# Patient Record
Sex: FEMALE | Race: OTHER | NOT HISPANIC OR LATINO
[De-identification: names, ages, dates, MRNs, and addresses within clinical notes are randomized per-mention and may not be internally consistent; named-entity substitution may affect disease eponyms.]

---

## 2017-01-11 ENCOUNTER — APPOINTMENT (OUTPATIENT)
Dept: NEUROLOGY | Facility: CLINIC | Age: 52
End: 2017-01-11

## 2017-01-11 ENCOUNTER — APPOINTMENT (OUTPATIENT)
Dept: OPHTHALMOLOGY | Facility: CLINIC | Age: 52
End: 2017-01-11

## 2017-01-11 VITALS
SYSTOLIC BLOOD PRESSURE: 154 MMHG | HEIGHT: 65.5 IN | WEIGHT: 164 LBS | DIASTOLIC BLOOD PRESSURE: 96 MMHG | HEART RATE: 55 BPM | BODY MASS INDEX: 27 KG/M2

## 2017-01-11 DIAGNOSIS — Z86.79 PERSONAL HISTORY OF OTHER DISEASES OF THE CIRCULATORY SYSTEM: ICD-10-CM

## 2017-01-11 DIAGNOSIS — Z78.9 OTHER SPECIFIED HEALTH STATUS: ICD-10-CM

## 2017-01-11 DIAGNOSIS — Z83.49 FAMILY HISTORY OF OTHER ENDOCRINE, NUTRITIONAL AND METABOLIC DISEASES: ICD-10-CM

## 2017-01-11 DIAGNOSIS — Z83.3 FAMILY HISTORY OF DIABETES MELLITUS: ICD-10-CM

## 2017-01-11 DIAGNOSIS — Z82.49 FAMILY HISTORY OF ISCHEMIC HEART DISEASE AND OTHER DISEASES OF THE CIRCULATORY SYSTEM: ICD-10-CM

## 2017-01-11 DIAGNOSIS — H35.033 HYPERTENSIVE RETINOPATHY, BILATERAL: ICD-10-CM

## 2017-01-20 ENCOUNTER — APPOINTMENT (OUTPATIENT)
Dept: NEUROLOGY | Facility: CLINIC | Age: 52
End: 2017-01-20

## 2017-02-06 ENCOUNTER — OUTPATIENT (OUTPATIENT)
Dept: OUTPATIENT SERVICES | Facility: HOSPITAL | Age: 52
LOS: 1 days | End: 2017-02-06
Payer: COMMERCIAL

## 2017-02-06 ENCOUNTER — APPOINTMENT (OUTPATIENT)
Dept: MRI IMAGING | Facility: CLINIC | Age: 52
End: 2017-02-06

## 2017-02-06 DIAGNOSIS — H26.40 UNSPECIFIED SECONDARY CATARACT: Chronic | ICD-10-CM

## 2017-02-06 DIAGNOSIS — Z00.8 ENCOUNTER FOR OTHER GENERAL EXAMINATION: ICD-10-CM

## 2017-02-06 PROCEDURE — 70551 MRI BRAIN STEM W/O DYE: CPT

## 2017-02-14 ENCOUNTER — APPOINTMENT (OUTPATIENT)
Dept: NEUROLOGY | Facility: CLINIC | Age: 52
End: 2017-02-14

## 2017-02-14 VITALS
WEIGHT: 165 LBS | DIASTOLIC BLOOD PRESSURE: 89 MMHG | HEART RATE: 61 BPM | HEIGHT: 65.5 IN | SYSTOLIC BLOOD PRESSURE: 130 MMHG | BODY MASS INDEX: 27.16 KG/M2

## 2017-02-14 DIAGNOSIS — M54.2 CERVICALGIA: ICD-10-CM

## 2017-02-14 DIAGNOSIS — G89.29 LOW BACK PAIN: ICD-10-CM

## 2017-02-14 DIAGNOSIS — M54.5 LOW BACK PAIN: ICD-10-CM

## 2017-02-15 PROBLEM — M54.5 CHRONIC LBP: Status: RESOLVED | Noted: 2017-02-15 | Resolved: 2017-02-15

## 2017-02-15 PROBLEM — M54.2 CERVICALGIA: Status: RESOLVED | Noted: 2017-02-15 | Resolved: 2017-02-15

## 2017-02-15 RX ORDER — CEFUROXIME AXETIL 500 MG/1
500 TABLET ORAL
Qty: 20 | Refills: 0 | Status: COMPLETED | COMMUNITY
Start: 2016-12-23 | End: 2017-02-15

## 2017-02-15 RX ORDER — MECLIZINE HYDROCHLORIDE 25 MG/1
25 TABLET ORAL
Qty: 90 | Refills: 0 | Status: COMPLETED | COMMUNITY
Start: 2016-12-23 | End: 2017-02-15

## 2017-03-29 ENCOUNTER — APPOINTMENT (OUTPATIENT)
Dept: NEUROLOGY | Facility: CLINIC | Age: 52
End: 2017-03-29

## 2017-03-29 VITALS
DIASTOLIC BLOOD PRESSURE: 85 MMHG | BODY MASS INDEX: 26.34 KG/M2 | HEIGHT: 65.5 IN | SYSTOLIC BLOOD PRESSURE: 131 MMHG | HEART RATE: 63 BPM | WEIGHT: 160 LBS

## 2017-03-29 DIAGNOSIS — Z78.9 OTHER SPECIFIED HEALTH STATUS: ICD-10-CM

## 2017-03-29 RX ORDER — LISINOPRIL 2.5 MG/1
2.5 TABLET ORAL
Refills: 0 | Status: DISCONTINUED | COMMUNITY
End: 2017-03-29

## 2017-03-29 RX ORDER — LISINOPRIL 5 MG/1
5 TABLET ORAL
Qty: 30 | Refills: 0 | Status: DISCONTINUED | COMMUNITY
Start: 2017-01-24 | End: 2017-03-29

## 2017-03-29 RX ORDER — MOMETASONE FUROATE 1 MG/G
0.1 CREAM TOPICAL
Qty: 45 | Refills: 0 | Status: DISCONTINUED | COMMUNITY
Start: 2016-08-23 | End: 2017-03-29

## 2017-03-29 RX ORDER — DICLOFENAC SODIUM 50 MG/1
50 TABLET, DELAYED RELEASE ORAL
Qty: 60 | Refills: 0 | Status: DISCONTINUED | COMMUNITY
Start: 2016-08-30 | End: 2017-03-29

## 2017-03-29 RX ORDER — LOSARTAN POTASSIUM 25 MG/1
25 TABLET, FILM COATED ORAL
Qty: 30 | Refills: 0 | Status: DISCONTINUED | COMMUNITY
Start: 2017-01-31 | End: 2017-03-29

## 2017-03-29 RX ORDER — ATORVASTATIN CALCIUM 40 MG/1
40 TABLET, FILM COATED ORAL
Refills: 0 | Status: DISCONTINUED | COMMUNITY
Start: 2017-01-11 | End: 2017-03-29

## 2017-04-04 ENCOUNTER — APPOINTMENT (OUTPATIENT)
Dept: NEUROLOGY | Facility: CLINIC | Age: 52
End: 2017-04-04

## 2017-04-12 ENCOUNTER — APPOINTMENT (OUTPATIENT)
Dept: NEUROLOGY | Facility: CLINIC | Age: 52
End: 2017-04-12

## 2017-04-12 VITALS
WEIGHT: 162 LBS | HEART RATE: 62 BPM | SYSTOLIC BLOOD PRESSURE: 119 MMHG | HEIGHT: 65.5 IN | BODY MASS INDEX: 26.67 KG/M2 | DIASTOLIC BLOOD PRESSURE: 76 MMHG

## 2017-04-12 PROBLEM — Z78.9 NON-SMOKER: Status: RESOLVED | Noted: 2017-01-11 | Resolved: 2017-04-12

## 2017-04-12 RX ORDER — AMLODIPINE BESYLATE 2.5 MG/1
2.5 TABLET ORAL
Qty: 30 | Refills: 0 | Status: COMPLETED | COMMUNITY
Start: 2017-02-07 | End: 2017-04-12

## 2017-04-12 RX ORDER — LEVETIRACETAM 500 MG/1
500 TABLET, FILM COATED ORAL DAILY
Qty: 30 | Refills: 3 | Status: DISCONTINUED | COMMUNITY
Start: 2017-02-14 | End: 2017-04-12

## 2017-04-12 RX ORDER — AMLODIPINE BESYLATE 5 MG/1
5 TABLET ORAL
Qty: 90 | Refills: 0 | Status: ACTIVE | COMMUNITY
Start: 2017-03-29

## 2017-04-19 ENCOUNTER — APPOINTMENT (OUTPATIENT)
Dept: OPHTHALMOLOGY | Facility: CLINIC | Age: 52
End: 2017-04-19

## 2017-05-04 PROBLEM — H35.033 HYPERTENSIVE RETINOPATHY, BILATERAL: Status: ACTIVE | Noted: 2017-01-11

## 2017-05-26 ENCOUNTER — APPOINTMENT (OUTPATIENT)
Dept: NEUROLOGY | Facility: CLINIC | Age: 52
End: 2017-05-26

## 2017-06-26 ENCOUNTER — APPOINTMENT (OUTPATIENT)
Dept: NEUROLOGY | Facility: CLINIC | Age: 52
End: 2017-06-26

## 2017-06-26 VITALS
HEIGHT: 65.5 IN | BODY MASS INDEX: 25.52 KG/M2 | HEART RATE: 56 BPM | WEIGHT: 155 LBS | SYSTOLIC BLOOD PRESSURE: 139 MMHG | DIASTOLIC BLOOD PRESSURE: 99 MMHG

## 2017-06-26 RX ORDER — BETAMETHASONE DIPROPIONATE 0.5 MG/G
0.05 CREAM, AUGMENTED TOPICAL
Qty: 50 | Refills: 0 | Status: DISCONTINUED | COMMUNITY
Start: 2016-07-14 | End: 2017-06-26

## 2017-06-26 RX ORDER — LIDOCAINE 5 G/100G
5 OINTMENT TOPICAL
Qty: 100 | Refills: 0 | Status: DISCONTINUED | COMMUNITY
Start: 2016-11-02 | End: 2017-06-26

## 2017-06-26 RX ORDER — DICLOFENAC SODIUM 3 G/100G
3 GEL TOPICAL
Qty: 100 | Refills: 0 | Status: DISCONTINUED | COMMUNITY
Start: 2016-10-25 | End: 2017-06-26

## 2017-07-08 ENCOUNTER — FORM ENCOUNTER (OUTPATIENT)
Age: 52
End: 2017-07-08

## 2017-07-09 ENCOUNTER — OUTPATIENT (OUTPATIENT)
Dept: OUTPATIENT SERVICES | Facility: HOSPITAL | Age: 52
LOS: 1 days | End: 2017-07-09
Payer: COMMERCIAL

## 2017-07-09 ENCOUNTER — APPOINTMENT (OUTPATIENT)
Dept: MRI IMAGING | Facility: CLINIC | Age: 52
End: 2017-07-09

## 2017-07-09 DIAGNOSIS — H26.40 UNSPECIFIED SECONDARY CATARACT: Chronic | ICD-10-CM

## 2017-07-09 DIAGNOSIS — I61.0 NONTRAUMATIC INTRACEREBRAL HEMORRHAGE IN HEMISPHERE, SUBCORTICAL: ICD-10-CM

## 2017-07-09 PROCEDURE — 70546 MR ANGIOGRAPH HEAD W/O&W/DYE: CPT

## 2017-07-09 PROCEDURE — 70553 MRI BRAIN STEM W/O & W/DYE: CPT

## 2017-07-09 PROCEDURE — A9585: CPT

## 2017-07-25 ENCOUNTER — APPOINTMENT (OUTPATIENT)
Dept: NEUROLOGY | Facility: CLINIC | Age: 52
End: 2017-07-25

## 2017-11-06 ENCOUNTER — APPOINTMENT (OUTPATIENT)
Dept: NEUROLOGY | Facility: CLINIC | Age: 52
End: 2017-11-06
Payer: COMMERCIAL

## 2017-11-06 VITALS
BODY MASS INDEX: 25.83 KG/M2 | HEIGHT: 65 IN | SYSTOLIC BLOOD PRESSURE: 118 MMHG | WEIGHT: 155 LBS | DIASTOLIC BLOOD PRESSURE: 74 MMHG | HEART RATE: 60 BPM

## 2017-11-06 PROCEDURE — 99215 OFFICE O/P EST HI 40 MIN: CPT

## 2017-11-06 RX ORDER — LORAZEPAM 1 MG/1
1 TABLET ORAL TWICE DAILY
Qty: 5 | Refills: 0 | Status: DISCONTINUED | COMMUNITY
Start: 2017-05-16 | End: 2017-11-06

## 2018-02-27 ENCOUNTER — INPATIENT (INPATIENT)
Facility: HOSPITAL | Age: 53
LOS: 7 days | Discharge: HOME | End: 2018-03-07
Attending: INTERNAL MEDICINE | Admitting: INTERNAL MEDICINE
Payer: COMMERCIAL

## 2018-02-27 ENCOUNTER — OTHER (OUTPATIENT)
Age: 53
End: 2018-02-27

## 2018-02-27 VITALS
OXYGEN SATURATION: 99 % | HEART RATE: 57 BPM | SYSTOLIC BLOOD PRESSURE: 141 MMHG | RESPIRATION RATE: 18 BRPM | DIASTOLIC BLOOD PRESSURE: 86 MMHG

## 2018-02-27 DIAGNOSIS — H26.40 UNSPECIFIED SECONDARY CATARACT: Chronic | ICD-10-CM

## 2018-02-27 LAB
ALBUMIN SERPL ELPH-MCNC: 4.2 G/DL — SIGNIFICANT CHANGE UP (ref 3.3–5)
ALP SERPL-CCNC: 58 U/L — SIGNIFICANT CHANGE UP (ref 40–120)
ALT FLD-CCNC: 13 U/L — SIGNIFICANT CHANGE UP (ref 4–33)
APPEARANCE UR: CLEAR — SIGNIFICANT CHANGE UP
APTT BLD: 31.9 SEC — SIGNIFICANT CHANGE UP (ref 27.5–37.4)
AST SERPL-CCNC: 16 U/L — SIGNIFICANT CHANGE UP (ref 4–32)
BASOPHILS # BLD AUTO: 0.05 K/UL — SIGNIFICANT CHANGE UP (ref 0–0.2)
BASOPHILS NFR BLD AUTO: 0.7 % — SIGNIFICANT CHANGE UP (ref 0–2)
BILIRUB SERPL-MCNC: 0.3 MG/DL — SIGNIFICANT CHANGE UP (ref 0.2–1.2)
BILIRUB UR-MCNC: NEGATIVE — SIGNIFICANT CHANGE UP
BLOOD UR QL VISUAL: NEGATIVE — SIGNIFICANT CHANGE UP
BUN SERPL-MCNC: 10 MG/DL — SIGNIFICANT CHANGE UP (ref 7–23)
CALCIUM SERPL-MCNC: 9 MG/DL — SIGNIFICANT CHANGE UP (ref 8.4–10.5)
CHLORIDE SERPL-SCNC: 102 MMOL/L — SIGNIFICANT CHANGE UP (ref 98–107)
CK MB BLD-MCNC: 1.12 NG/ML — SIGNIFICANT CHANGE UP (ref 1–4.7)
CK MB BLD-MCNC: SIGNIFICANT CHANGE UP (ref 0–2.5)
CK SERPL-CCNC: 75 U/L — SIGNIFICANT CHANGE UP (ref 25–170)
CO2 SERPL-SCNC: 28 MMOL/L — SIGNIFICANT CHANGE UP (ref 22–31)
COLOR SPEC: SIGNIFICANT CHANGE UP
CREAT SERPL-MCNC: 0.79 MG/DL — SIGNIFICANT CHANGE UP (ref 0.5–1.3)
EOSINOPHIL # BLD AUTO: 0.16 K/UL — SIGNIFICANT CHANGE UP (ref 0–0.5)
EOSINOPHIL NFR BLD AUTO: 2.2 % — SIGNIFICANT CHANGE UP (ref 0–6)
GLUCOSE SERPL-MCNC: 132 MG/DL — HIGH (ref 70–99)
GLUCOSE UR-MCNC: NEGATIVE — SIGNIFICANT CHANGE UP
HCT VFR BLD CALC: 40 % — SIGNIFICANT CHANGE UP (ref 34.5–45)
HGB BLD-MCNC: 13.2 G/DL — SIGNIFICANT CHANGE UP (ref 11.5–15.5)
IMM GRANULOCYTES # BLD AUTO: 0.02 # — SIGNIFICANT CHANGE UP
IMM GRANULOCYTES NFR BLD AUTO: 0.3 % — SIGNIFICANT CHANGE UP (ref 0–1.5)
INR BLD: 0.97 — SIGNIFICANT CHANGE UP (ref 0.88–1.17)
KETONES UR-MCNC: NEGATIVE — SIGNIFICANT CHANGE UP
LEUKOCYTE ESTERASE UR-ACNC: NEGATIVE — SIGNIFICANT CHANGE UP
LYMPHOCYTES # BLD AUTO: 2.8 K/UL — SIGNIFICANT CHANGE UP (ref 1–3.3)
LYMPHOCYTES # BLD AUTO: 39.3 % — SIGNIFICANT CHANGE UP (ref 13–44)
MCHC RBC-ENTMCNC: 28.6 PG — SIGNIFICANT CHANGE UP (ref 27–34)
MCHC RBC-ENTMCNC: 33 % — SIGNIFICANT CHANGE UP (ref 32–36)
MCV RBC AUTO: 86.8 FL — SIGNIFICANT CHANGE UP (ref 80–100)
MONOCYTES # BLD AUTO: 0.48 K/UL — SIGNIFICANT CHANGE UP (ref 0–0.9)
MONOCYTES NFR BLD AUTO: 6.7 % — SIGNIFICANT CHANGE UP (ref 2–14)
NEUTROPHILS # BLD AUTO: 3.61 K/UL — SIGNIFICANT CHANGE UP (ref 1.8–7.4)
NEUTROPHILS NFR BLD AUTO: 50.8 % — SIGNIFICANT CHANGE UP (ref 43–77)
NITRITE UR-MCNC: NEGATIVE — SIGNIFICANT CHANGE UP
NRBC # FLD: 0 — SIGNIFICANT CHANGE UP
PH UR: 6.5 — SIGNIFICANT CHANGE UP (ref 4.6–8)
PLATELET # BLD AUTO: 259 K/UL — SIGNIFICANT CHANGE UP (ref 150–400)
PMV BLD: 10.8 FL — SIGNIFICANT CHANGE UP (ref 7–13)
POTASSIUM SERPL-MCNC: 4.2 MMOL/L — SIGNIFICANT CHANGE UP (ref 3.5–5.3)
POTASSIUM SERPL-SCNC: 4.2 MMOL/L — SIGNIFICANT CHANGE UP (ref 3.5–5.3)
PROT SERPL-MCNC: 6.9 G/DL — SIGNIFICANT CHANGE UP (ref 6–8.3)
PROT UR-MCNC: NEGATIVE MG/DL — SIGNIFICANT CHANGE UP
PROTHROM AB SERPL-ACNC: 11.2 SEC — SIGNIFICANT CHANGE UP (ref 9.8–13.1)
RBC # BLD: 4.61 M/UL — SIGNIFICANT CHANGE UP (ref 3.8–5.2)
RBC # FLD: 13.3 % — SIGNIFICANT CHANGE UP (ref 10.3–14.5)
SODIUM SERPL-SCNC: 142 MMOL/L — SIGNIFICANT CHANGE UP (ref 135–145)
SP GR SPEC: 1.01 — SIGNIFICANT CHANGE UP (ref 1–1.04)
SQUAMOUS # UR AUTO: SIGNIFICANT CHANGE UP
TROPONIN T SERPL-MCNC: < 0.06 NG/ML — SIGNIFICANT CHANGE UP (ref 0–0.06)
TSH SERPL-MCNC: 4.32 UIU/ML — HIGH (ref 0.27–4.2)
UROBILINOGEN FLD QL: NORMAL MG/DL — SIGNIFICANT CHANGE UP
WBC # BLD: 7.12 K/UL — SIGNIFICANT CHANGE UP (ref 3.8–10.5)
WBC # FLD AUTO: 7.12 K/UL — SIGNIFICANT CHANGE UP (ref 3.8–10.5)

## 2018-02-27 PROCEDURE — 71046 X-RAY EXAM CHEST 2 VIEWS: CPT | Mod: 26

## 2018-02-27 RX ORDER — METOCLOPRAMIDE HCL 10 MG
10 TABLET ORAL ONCE
Qty: 0 | Refills: 0 | Status: COMPLETED | OUTPATIENT
Start: 2018-02-27 | End: 2018-02-27

## 2018-02-27 RX ORDER — ACETAMINOPHEN 500 MG
975 TABLET ORAL ONCE
Qty: 0 | Refills: 0 | Status: COMPLETED | OUTPATIENT
Start: 2018-02-27 | End: 2018-02-27

## 2018-02-27 RX ORDER — SODIUM CHLORIDE 9 MG/ML
1000 INJECTION INTRAMUSCULAR; INTRAVENOUS; SUBCUTANEOUS ONCE
Qty: 0 | Refills: 0 | Status: COMPLETED | OUTPATIENT
Start: 2018-02-27 | End: 2018-02-27

## 2018-02-27 RX ADMIN — Medication 975 MILLIGRAM(S): at 22:42

## 2018-02-27 RX ADMIN — Medication 975 MILLIGRAM(S): at 23:28

## 2018-02-27 RX ADMIN — Medication 10 MILLIGRAM(S): at 22:42

## 2018-02-27 RX ADMIN — SODIUM CHLORIDE 1000 MILLILITER(S): 9 INJECTION INTRAMUSCULAR; INTRAVENOUS; SUBCUTANEOUS at 22:42

## 2018-02-27 NOTE — ED PROVIDER NOTE - CARDIAC, MLM
Normal rate, regular rhythm.  Heart sounds S1, S2.  No murmurs, rubs or gallops. +reproducible CP wall pain in left pectoral region

## 2018-02-27 NOTE — ED PROVIDER NOTE - SHIFT CHANGE DETAILS
I have signed over this patient to the above attending physician. Pertinent history, physical exam findings and workup thus far in the ED have been discussed. The pending tests and plan, including LP and reassessment were signed over.  All questions from the above attending physician have been answered.

## 2018-02-27 NOTE — ED PROVIDER NOTE - FAMILY HISTORY
Aunt  Still living? Unknown  Family history of cerebrovascular accident (CVA), Age at diagnosis: Age Unknown     Father  Still living? Unknown  Family history of Parkinson disease, Age at diagnosis: Age Unknown     Mother  Still living? Unknown  Family history of hypertension, Age at diagnosis: Age Unknown

## 2018-02-27 NOTE — ED PROVIDER NOTE - MEDICAL DECISION MAKING DETAILS
51 y/o female with pmhx of ICH (residual left sided weakness/numbness and hyperacusis, baseline difficulty findings words), HTN, GERD, neuropathy presents to ED for gradual onset right sided "pressure-like" nonexertional headache since 8:30pm last night. plan: labs, CT head r/o CVA, likely LP to r/o subarachnoid hemorrhage given hx.

## 2018-02-27 NOTE — ED ADULT TRIAGE NOTE - CHIEF COMPLAINT QUOTE
pt c/o severe HA since last night, Hx of hemorraghic stroke with residual left sided weakness. pt states feels like her left side is weaker then normal. MD Donald called for stroke eval, code stroke called at 2045.  pt taken directly to CT.

## 2018-02-27 NOTE — ED PROVIDER NOTE - PROGRESS NOTE DETAILS
SHIMA Olivia - as per Dr. Matos, received call from radiology attng regarding change in CT read, possible bleed and suggesting f/u CT. awaiting CT head. pt stable. SHIMA Tsang I called radiology, spoke with Dr. Mccollum attending for 2nd CT scan and reading as possible subarachnoid hemorrhage. neurosurg paged. SHIMA Tsang I called radiology, spoke with Dr. Mccollum attending for 2nd CT scan and reading as possible subarachnoid hemorrhage. neurosurg paged and will see pt in ED shortly. SHIMA Olivia - neurology paged. PA Gerasimou - neurosurg PA does not believe CT head shows any hemorrhage suggesting to LP. SHIMA Argueta - pt signed out to me at the shift change. 51 y/o female withy Pmhx of SAH with HA (resolved), questionable small bleed on CT, LP neg for xanthochromia, seen by neurosurg, repat CT neg, will be seen by neurosurg att am, if cleared will dc with o/p f/u. SHIMA Argueta - seen by neurosurg att Dr Barillas, it is ok to dc with neurosurg o/p f/u. SHIMA Argueta - pt discharged, IV out but now c/o worsening HA, will reinsert IV, pain meds, will admit for pain control. Dilia att: 8A Patient signed out to me. 52F h/o SAH p/w sudden onset rt sided ha. CT x 2 neg sah, 04:45 LP neg xanthochromia, ok for dc per Neurosx. Patient preparing for discharge. Patient sat up suddenly and felt sudden onset diffuse coronal ha, relieved with lying flat, worse with sound and light. Patient re-evaluated by me. Written for 2L saline and IV caffeine. If no improvement consider Anesthesia for blood patch +/- CT head. SHIMA Argueta - pt with improved HA when she stays flat, ESCOBEDO comes back when pt sits up making it suspicious for post LP HA, anesthesia called, they recommend conservative management for 24hrs, if no improvement  will consider blood patch. SHIMA shields - spoke with PCP Dr Meza, will admit to dr Farley (called and notified).

## 2018-02-27 NOTE — ED ADULT NURSE NOTE - OBJECTIVE STATEMENT
Fac RN: Pt received in room 20, aox3, ambulatory, accompanied by , stroke code called for patient. Pt presents with HA that started last night, took tylenol but with no relief, was also reporting that she feels "confused" and is having a hard time understanding of "what people are telling me." Pt also c/o L sided weakness and L sided chest tightness radiating to the back. Pt with residual L sided weakness from a previous hemorrhagic stroke in 2016, sts similar kind of HA from, previous stroke but sts that she feels like the L sided weakness is worse now. Neuro eval done after CT- TPA not recommended. ED MD eval ongoing at this time, 20G placed on R AC, labs sent, VS as noted, NAD, report given to primary RN.

## 2018-02-27 NOTE — ED PROVIDER NOTE - OBJECTIVE STATEMENT
53 y/o female with pmhx of ICH (residual left sided weakness/numbness and hyperacusis, baseline difficulty findings words), HTN, GERD, neuropathy presents to ED for gradual onset right sided "pressure-like" nonexertional headache since 8:30pm last night. Now migrating to left side. Intermittent, found relief with Tylenol last night, now constant for last hour. Associated with nausea, photophobia and worsening left hyperacusis, 8/10. States she felt tired and had difficulty concentrating at work today. Sent in by her neurologist Dr. Castellon. Did not take any pain meds today. Pt states takes Gabapentin for left sided UE/LE numbness and feels worse x 2 days. Pt also admitted to left sided chest "pressure" x few hours. Associated with "feeling harder to breathe" although denies SOB. States different than baseline GERD. 8/10. Nonexertional, nonpleuritic, nonradiating. Never had stress test, had normal echo in past. No recent travel, surgeries, ocp use, IUDs, immobilization/recent hospitalization. No fever, chills, palpitations, cough, abd pain, vomiting, new weakness or numbness, 51 y/o female with pmhx of ICH (residual left sided weakness/numbness and hyperacusis, baseline difficulty findings words), HTN, GERD, neuropathy presents to ED for gradual onset right sided "pressure-like" nonexertional headache since 8:30pm last night. Now migrating to left side. Intermittent, found relief with Tylenol last night, now constant for last hour. Associated with nausea, photophobia and worsening left hyperacusis, 8/10. States she felt tired and had difficulty concentrating at work today. Sent in by her neurologist Dr. Castellon. Did not take any pain meds today. Pt states takes Gabapentin for left sided UE/LE numbness and feels worse x 2 days. Pt also admitted to left sided chest "pressure" x few hours. Associated with "feeling harder to breathe" although denies SOB. States different than baseline GERD. 8/10. Nonexertional, nonpleuritic, nonradiating. Fam hx of MI mother at age 80. Never had stress test, had normal echo in past. No recent travel, surgeries, ocp use, IUDs, immobilization/recent hospitalization. No fever, chills, palpitations, cough, abd pain, vomiting, new weakness or numbness,

## 2018-02-27 NOTE — ED PROVIDER NOTE - ATTENDING CONTRIBUTION TO CARE
Beth: 51 yo female with a h/o spontaneous right sided ICH and residual left sided sensory deficits c/o headache that began last night. Pt has not taken anything for pain at home. Headache was not acute onset- gradually worsened over time and is not associated with visual changes, or dizziness. Pt feels like her hyperacusis has worsened since the headache began too. Pt also c/o intermittent chest tightness but no SOB. Exam: CNII-XII intact, no dysmetria or disdiadochokinesia, 5/5 strength x 4 extremities. +LLE sensory deficits- chronic as per patient.  +S1/S2, lungs CTA, abdomen is soft and nontender. 2+ distal pulses x 4 extremities. abdomen is soft and nontender with no pulsatile mass. A/P-51 yo female code stroke for right sided headache and h/o ICH. Ct head unremarkable. will treat symptomatically and reassess.

## 2018-02-28 DIAGNOSIS — R51 HEADACHE: ICD-10-CM

## 2018-02-28 DIAGNOSIS — I10 ESSENTIAL (PRIMARY) HYPERTENSION: ICD-10-CM

## 2018-02-28 LAB
CK MB BLD-MCNC: 1.06 NG/ML — SIGNIFICANT CHANGE UP (ref 1–4.7)
CK MB BLD-MCNC: SIGNIFICANT CHANGE UP (ref 0–2.5)
CK SERPL-CCNC: 55 U/L — SIGNIFICANT CHANGE UP (ref 25–170)
CLARITY CSF: CLEAR — SIGNIFICANT CHANGE UP
COLOR CSF: SIGNIFICANT CHANGE UP
GLUCOSE CSF-MCNC: 68 MG/DL — SIGNIFICANT CHANGE UP (ref 40–70)
GRAM STN CSF: SIGNIFICANT CHANGE UP
NRBC NFR CSF: < 1 CELL/UL — SIGNIFICANT CHANGE UP (ref 0–5)
PROT CSF-MCNC: 28.8 MG/DL — SIGNIFICANT CHANGE UP (ref 15–45)
RBC # CSF: < 1 CELL/UL — HIGH (ref 0–0)
SPECIMEN SOURCE: SIGNIFICANT CHANGE UP
TROPONIN T SERPL-MCNC: < 0.06 NG/ML — SIGNIFICANT CHANGE UP (ref 0–0.06)
XANTHOCHROMIA: SIGNIFICANT CHANGE UP

## 2018-02-28 PROCEDURE — 70450 CT HEAD/BRAIN W/O DYE: CPT | Mod: 26

## 2018-02-28 PROCEDURE — 99222 1ST HOSP IP/OBS MODERATE 55: CPT

## 2018-02-28 RX ORDER — GABAPENTIN 400 MG/1
300 CAPSULE ORAL THREE TIMES A DAY
Qty: 0 | Refills: 0 | Status: DISCONTINUED | OUTPATIENT
Start: 2018-02-28 | End: 2018-03-07

## 2018-02-28 RX ORDER — PANTOPRAZOLE SODIUM 20 MG/1
40 TABLET, DELAYED RELEASE ORAL
Qty: 0 | Refills: 0 | Status: DISCONTINUED | OUTPATIENT
Start: 2018-02-28 | End: 2018-03-07

## 2018-02-28 RX ORDER — LISINOPRIL 2.5 MG/1
2.5 TABLET ORAL DAILY
Qty: 0 | Refills: 0 | Status: DISCONTINUED | OUTPATIENT
Start: 2018-02-28 | End: 2018-03-01

## 2018-02-28 RX ORDER — SODIUM CHLORIDE 9 MG/ML
2000 INJECTION INTRAMUSCULAR; INTRAVENOUS; SUBCUTANEOUS ONCE
Qty: 0 | Refills: 0 | Status: COMPLETED | OUTPATIENT
Start: 2018-02-28 | End: 2018-02-28

## 2018-02-28 RX ORDER — ACETAMINOPHEN 500 MG
650 TABLET ORAL EVERY 6 HOURS
Qty: 0 | Refills: 0 | Status: DISCONTINUED | OUTPATIENT
Start: 2018-02-28 | End: 2018-03-03

## 2018-02-28 RX ORDER — GABAPENTIN 400 MG/1
100 CAPSULE ORAL ONCE
Qty: 0 | Refills: 0 | Status: COMPLETED | OUTPATIENT
Start: 2018-02-28 | End: 2018-02-28

## 2018-02-28 RX ORDER — ATORVASTATIN CALCIUM 80 MG/1
40 TABLET, FILM COATED ORAL AT BEDTIME
Qty: 0 | Refills: 0 | Status: DISCONTINUED | OUTPATIENT
Start: 2018-02-28 | End: 2018-03-01

## 2018-02-28 RX ORDER — METOCLOPRAMIDE HCL 10 MG
10 TABLET ORAL ONCE
Qty: 0 | Refills: 0 | Status: COMPLETED | OUTPATIENT
Start: 2018-02-28 | End: 2018-02-28

## 2018-02-28 RX ORDER — CAFFEINE/SODIUM BENZOATE 250 MG/ML
500 VIAL (ML) INJECTION ONCE
Qty: 0 | Refills: 0 | Status: COMPLETED | OUTPATIENT
Start: 2018-02-28 | End: 2018-02-28

## 2018-02-28 RX ADMIN — Medication 10 MILLIGRAM(S): at 08:50

## 2018-02-28 RX ADMIN — Medication 1002 MILLIGRAM(S): at 10:00

## 2018-02-28 RX ADMIN — SODIUM CHLORIDE 2000 MILLILITER(S): 9 INJECTION INTRAMUSCULAR; INTRAVENOUS; SUBCUTANEOUS at 09:04

## 2018-02-28 RX ADMIN — GABAPENTIN 300 MILLIGRAM(S): 400 CAPSULE ORAL at 23:31

## 2018-02-28 RX ADMIN — GABAPENTIN 100 MILLIGRAM(S): 400 CAPSULE ORAL at 10:27

## 2018-02-28 RX ADMIN — Medication 650 MILLIGRAM(S): at 23:31

## 2018-02-28 RX ADMIN — ATORVASTATIN CALCIUM 40 MILLIGRAM(S): 80 TABLET, FILM COATED ORAL at 22:37

## 2018-02-28 NOTE — H&P ADULT - ASSESSMENT
53 y/o female with pmhx of ICH (residual left sided weakness/numbness and hyperacusis, baseline difficulty findings words), HTN, GERD, neuropathy presents to ED for gradual onset right sided "pressure-like" nonexertional headache since 8:30pm last night. Now migrating to left side. Intermittent, found relief with Tylenol last night, now constant for last hour. Associated with nausea, photophobia and worsening left hyperacusis, 8/10. States she felt tired and had difficulty concentrating at work today. Sent in by her neurologist Dr. Castellon. Did not take any pain meds today. Pt states takes Gabapentin for left sided UE/LE numbness and feels worse x 2 days. Pt also admitted to left sided chest "pressure" x few hours. Associated with "feeling harder to breathe" although denies SOB. States different than baseline GERD. 8/10. Nonexertional, nonpleuritic, nonradiating. Fam hx of MI mother at age 80. Never had stress test, had normal echo in past. No recent travel, surgeries, ocp use, IUDs, immobilization/recent hospitalization. No fever, chills, palpitations, cough, abd pain, vomiting, new weakness or numbness,

## 2018-02-28 NOTE — PATIENT PROFILE ADULT. - VISION (WITH CORRECTIVE LENSES IF THE PATIENT USUALLY WEARS THEM):
New onset L ear difficulty/Partially impaired: cannot see medication labels or newsprint, but can see obstacles in path, and the surrounding layout; can count fingers at arm's length

## 2018-02-28 NOTE — H&P ADULT - FAMILY HISTORY
Family history of Parkinson disease     Family history of hypertension     Aunt  Still living? Unknown  Family history of cerebrovascular accident (CVA), Age at diagnosis: Age Unknown

## 2018-02-28 NOTE — PATIENT PROFILE ADULT. - TEACHING/LEARNING LEARNING PREFERENCES
skill demonstration/individual instruction/written material/video/pictorial/computer/internet/verbal instruction/audio/group instruction

## 2018-02-28 NOTE — H&P ADULT - NSHPPHYSICALEXAM_GEN_ALL_CORE
General: WN/WD NAD  PERRLA  Neurology: A&Ox3, nonfocal, VILLAVICENCIO x 4  Respiratory: CTA B/L  CV: RRR, S1S2, no murmurs, rubs or gallops  Abdominal: Soft, NT, ND +BS, Last BM  Extremities: No edema, + peripheral pulses  Skin Normal

## 2018-02-28 NOTE — H&P ADULT - NSHPLABSRESULTS_GEN_ALL_CORE
Lab Results:  CBC  CBC Full  -  ( 2018 21:15 )  WBC Count : 7.12 K/uL  Hemoglobin : 13.2 g/dL  Hematocrit : 40.0 %  Platelet Count - Automated : 259 K/uL  Mean Cell Volume : 86.8 fL  Mean Cell Hemoglobin : 28.6 pg  Mean Cell Hemoglobin Concentration : 33.0 %  Auto Neutrophil # : 3.61 K/uL  Auto Lymphocyte # : 2.80 K/uL  Auto Monocyte # : 0.48 K/uL  Auto Eosinophil # : 0.16 K/uL  Auto Basophil # : 0.05 K/uL  Auto Neutrophil % : 50.8 %  Auto Lymphocyte % : 39.3 %  Auto Monocyte % : 6.7 %  Auto Eosinophil % : 2.2 %  Auto Basophil % : 0.7 %    .		Differential:	[] Automated		[] Manual  Chemistry                        13.2   7.12  )-----------( 259      ( 2018 21:15 )             40.0         142  |  102  |  10  ----------------------------<  132<H>  4.2   |  28  |  0.79    Ca    9.0      2018 21:15    TPro  6.9  /  Alb  4.2  /  TBili  0.3  /  DBili  x   /  AST  16  /  ALT  13  /  AlkPhos  58      LIVER FUNCTIONS - ( 2018 21:15 )  Alb: 4.2 g/dL / Pro: 6.9 g/dL / ALK PHOS: 58 u/L / ALT: 13 u/L / AST: 16 u/L / GGT: x           PT/INR - ( 2018 21:15 )   PT: 11.2 SEC;   INR: 0.97          PTT - ( 2018 21:15 )  PTT:31.9 SEC  Urinalysis Basic - ( 2018 22:40 )    Color: COLORL / Appearance: CLEAR / S.007 / pH: 6.5  Gluc: NEGATIVE / Ketone: NEGATIVE  / Bili: NEGATIVE / Urobili: NORMAL mg/dL   Blood: NEGATIVE / Protein: NEGATIVE mg/dL / Nitrite: NEGATIVE   Leuk Esterase: NEGATIVE / RBC: x / WBC x   Sq Epi: OCC / Non Sq Epi: x / Bacteria: x            MICROBIOLOGY/CULTURES:      RADIOLOGY RESULTS: reviewed

## 2018-02-28 NOTE — CONSULT NOTE ADULT - ASSESSMENT
51 y/o RH woman w/ HTN, HLD, R BG ICH in 12/2016 (hypertensive) w/ central pain syndrome presenting to ER c/o left-sided headache and worsening left-sided pain x 2 days. Patient was advised to come to ER by her neurologist to r/o acute ICH. Code stroke called.  Neurologic examination is consistent with patient's chronic deficits 2/2 R BG ICH in 2016.   CTH shows no acute pathology.   Impression: Likely exacerbation of chronic pain syndrome with possible additional migrainous component (headache + photophobia + phonophobia + nausea).     Recommend:  - Not a tPA candidate given symptoms present > 24 hours, no new neurologic deficits, and history of spontaneous ICH  - Symptomatic treatment: Recommend increasing gabapentin to 300 mg TID to treat possible central pain and headaches  - Tylenol 650 mg, Reglan 10 mg IVP for headache management. Would avoid NSAIDs given history of ICH.   - Workup per ED for L shoulder/chest pain  - No further inpatient neurologic testing at this time. Patient should follow up with her neurologist, Dr. Castellon at [address] [place] [address] for further management
51 YO female with Hx Right basal ganglia hemorrhage, HTN with headache, R/O SAH

## 2018-03-01 LAB
ALBUMIN SERPL ELPH-MCNC: 4 G/DL — SIGNIFICANT CHANGE UP (ref 3.3–5)
ALP SERPL-CCNC: 57 U/L — SIGNIFICANT CHANGE UP (ref 40–120)
ALT FLD-CCNC: 14 U/L — SIGNIFICANT CHANGE UP (ref 4–33)
AST SERPL-CCNC: 15 U/L — SIGNIFICANT CHANGE UP (ref 4–32)
BACTERIA UR CULT: SIGNIFICANT CHANGE UP
BILIRUB SERPL-MCNC: 0.6 MG/DL — SIGNIFICANT CHANGE UP (ref 0.2–1.2)
BUN SERPL-MCNC: 13 MG/DL — SIGNIFICANT CHANGE UP (ref 7–23)
CALCIUM SERPL-MCNC: 8.9 MG/DL — SIGNIFICANT CHANGE UP (ref 8.4–10.5)
CHLORIDE SERPL-SCNC: 104 MMOL/L — SIGNIFICANT CHANGE UP (ref 98–107)
CHOLEST SERPL-MCNC: 208 MG/DL — HIGH (ref 120–199)
CO2 SERPL-SCNC: 26 MMOL/L — SIGNIFICANT CHANGE UP (ref 22–31)
CREAT SERPL-MCNC: 0.75 MG/DL — SIGNIFICANT CHANGE UP (ref 0.5–1.3)
GLUCOSE SERPL-MCNC: 131 MG/DL — HIGH (ref 70–99)
HCT VFR BLD CALC: 41 % — SIGNIFICANT CHANGE UP (ref 34.5–45)
HDLC SERPL-MCNC: 60 MG/DL — SIGNIFICANT CHANGE UP (ref 45–65)
HGB BLD-MCNC: 13.3 G/DL — SIGNIFICANT CHANGE UP (ref 11.5–15.5)
LIPID PNL WITH DIRECT LDL SERPL: 139 MG/DL — SIGNIFICANT CHANGE UP
MAGNESIUM SERPL-MCNC: 1.9 MG/DL — SIGNIFICANT CHANGE UP (ref 1.6–2.6)
MCHC RBC-ENTMCNC: 28 PG — SIGNIFICANT CHANGE UP (ref 27–34)
MCHC RBC-ENTMCNC: 32.4 % — SIGNIFICANT CHANGE UP (ref 32–36)
MCV RBC AUTO: 86.3 FL — SIGNIFICANT CHANGE UP (ref 80–100)
NRBC # FLD: 0 — SIGNIFICANT CHANGE UP
PHOSPHATE SERPL-MCNC: 3.6 MG/DL — SIGNIFICANT CHANGE UP (ref 2.5–4.5)
PLATELET # BLD AUTO: 246 K/UL — SIGNIFICANT CHANGE UP (ref 150–400)
PMV BLD: 10.7 FL — SIGNIFICANT CHANGE UP (ref 7–13)
POTASSIUM SERPL-MCNC: 3.6 MMOL/L — SIGNIFICANT CHANGE UP (ref 3.5–5.3)
POTASSIUM SERPL-SCNC: 3.6 MMOL/L — SIGNIFICANT CHANGE UP (ref 3.5–5.3)
PROT SERPL-MCNC: 6.7 G/DL — SIGNIFICANT CHANGE UP (ref 6–8.3)
RBC # BLD: 4.75 M/UL — SIGNIFICANT CHANGE UP (ref 3.8–5.2)
RBC # FLD: 13.2 % — SIGNIFICANT CHANGE UP (ref 10.3–14.5)
SODIUM SERPL-SCNC: 141 MMOL/L — SIGNIFICANT CHANGE UP (ref 135–145)
SPECIMEN SOURCE: SIGNIFICANT CHANGE UP
TRIGL SERPL-MCNC: 99 MG/DL — SIGNIFICANT CHANGE UP (ref 10–149)
WBC # BLD: 5.4 K/UL — SIGNIFICANT CHANGE UP (ref 3.8–10.5)
WBC # FLD AUTO: 5.4 K/UL — SIGNIFICANT CHANGE UP (ref 3.8–10.5)

## 2018-03-01 PROCEDURE — 70551 MRI BRAIN STEM W/O DYE: CPT | Mod: 26

## 2018-03-01 RX ORDER — METOCLOPRAMIDE HCL 10 MG
10 TABLET ORAL ONCE
Qty: 0 | Refills: 0 | Status: COMPLETED | OUTPATIENT
Start: 2018-03-01 | End: 2018-03-01

## 2018-03-01 RX ORDER — SODIUM CHLORIDE 9 MG/ML
1000 INJECTION INTRAMUSCULAR; INTRAVENOUS; SUBCUTANEOUS
Qty: 0 | Refills: 0 | Status: DISCONTINUED | OUTPATIENT
Start: 2018-03-01 | End: 2018-03-06

## 2018-03-01 RX ORDER — AMLODIPINE BESYLATE 2.5 MG/1
5 TABLET ORAL DAILY
Qty: 0 | Refills: 0 | Status: DISCONTINUED | OUTPATIENT
Start: 2018-03-01 | End: 2018-03-07

## 2018-03-01 RX ORDER — OMEPRAZOLE 10 MG/1
1 CAPSULE, DELAYED RELEASE ORAL
Qty: 0 | Refills: 0 | COMMUNITY

## 2018-03-01 RX ADMIN — Medication 650 MILLIGRAM(S): at 00:31

## 2018-03-01 RX ADMIN — SODIUM CHLORIDE 100 MILLILITER(S): 9 INJECTION INTRAMUSCULAR; INTRAVENOUS; SUBCUTANEOUS at 22:19

## 2018-03-01 RX ADMIN — Medication 650 MILLIGRAM(S): at 08:11

## 2018-03-01 RX ADMIN — Medication 650 MILLIGRAM(S): at 07:16

## 2018-03-01 RX ADMIN — GABAPENTIN 300 MILLIGRAM(S): 400 CAPSULE ORAL at 07:17

## 2018-03-01 RX ADMIN — SODIUM CHLORIDE 100 MILLILITER(S): 9 INJECTION INTRAMUSCULAR; INTRAVENOUS; SUBCUTANEOUS at 16:55

## 2018-03-01 RX ADMIN — Medication 10 MILLIGRAM(S): at 07:27

## 2018-03-01 RX ADMIN — PANTOPRAZOLE SODIUM 40 MILLIGRAM(S): 20 TABLET, DELAYED RELEASE ORAL at 07:16

## 2018-03-01 RX ADMIN — Medication 650 MILLIGRAM(S): at 16:55

## 2018-03-01 RX ADMIN — GABAPENTIN 300 MILLIGRAM(S): 400 CAPSULE ORAL at 14:55

## 2018-03-01 RX ADMIN — Medication 650 MILLIGRAM(S): at 17:50

## 2018-03-01 RX ADMIN — GABAPENTIN 300 MILLIGRAM(S): 400 CAPSULE ORAL at 22:15

## 2018-03-01 RX ADMIN — AMLODIPINE BESYLATE 5 MILLIGRAM(S): 2.5 TABLET ORAL at 12:19

## 2018-03-01 NOTE — PROGRESS NOTE ADULT - SUBJECTIVE AND OBJECTIVE BOX
Patient is a 52y old  Female who presents with a chief complaint of c/o headache,Lt side weakness, burning sensation ,numbness (2018 22:44)      INTERVAL HPI/OVERNIGHT EVENTS:  T(C): 36.8 (18 @ 15:05), Max: 36.8 (18 @ 21:07)  HR: 58 (18 @ 15:05) (43 - 58)  BP: 128/85 (18 @ 15:05) (118/76 - 158/95)  RR: 18 (18 @ 15:05) (18 - 18)  SpO2: 100% (18 @ 15:05) (98% - 100%)  Wt(kg): --  I&O's Summary      LABS:                        13.3   5.40  )-----------( 246      ( 01 Mar 2018 07:05 )             41.0     03-01    141  |  104  |  13  ----------------------------<  131<H>  3.6   |  26  |  0.75    Ca    8.9      01 Mar 2018 07:05  Phos  3.6     03-01  Mg     1.9     -    TPro  6.7  /  Alb  4.0  /  TBili  0.6  /  DBili  x   /  AST  15  /  ALT  14  /  AlkPhos  57  03-01    PT/INR - ( 2018 21:15 )   PT: 11.2 SEC;   INR: 0.97          PTT - ( 2018 21:15 )  PTT:31.9 SEC  Urinalysis Basic - ( 2018 22:40 )    Color: COLORL / Appearance: CLEAR / S.007 / pH: 6.5  Gluc: NEGATIVE / Ketone: NEGATIVE  / Bili: NEGATIVE / Urobili: NORMAL mg/dL   Blood: NEGATIVE / Protein: NEGATIVE mg/dL / Nitrite: NEGATIVE   Leuk Esterase: NEGATIVE / RBC: x / WBC x   Sq Epi: OCC / Non Sq Epi: x / Bacteria: x      CAPILLARY BLOOD GLUCOSE            Urinalysis Basic - ( 2018 22:40 )    Color: COLORL / Appearance: CLEAR / S.007 / pH: 6.5  Gluc: NEGATIVE / Ketone: NEGATIVE  / Bili: NEGATIVE / Urobili: NORMAL mg/dL   Blood: NEGATIVE / Protein: NEGATIVE mg/dL / Nitrite: NEGATIVE   Leuk Esterase: NEGATIVE / RBC: x / WBC x   Sq Epi: OCC / Non Sq Epi: x / Bacteria: x        MEDICATIONS  (STANDING):  amLODIPine   Tablet 5 milliGRAM(s) Oral daily  atorvastatin 40 milliGRAM(s) Oral at bedtime  gabapentin 300 milliGRAM(s) Oral three times a day  pantoprazole    Tablet 40 milliGRAM(s) Oral before breakfast  sodium chloride 0.9%. 1000 milliLiter(s) (100 mL/Hr) IV Continuous <Continuous>    MEDICATIONS  (PRN):  acetaminophen   Tablet. 650 milliGRAM(s) Oral every 6 hours PRN headache          PHYSICAL EXAM:  GENERAL: NAD, well-groomed, well-developed  HEAD:  Atraumatic, Normocephalic  CHEST/LUNG: Clear to percussion bilaterally; No rales, rhonchi, wheezing, or rubs  HEART: Regular rate and rhythm; No murmurs, rubs, or gallops  ABDOMEN: Soft, Nontender, Nondistended; Bowel sounds present  EXTREMITIES:  2+ Peripheral Pulses, No clubbing, cyanosis, or edema  LYMPH: No lymphadenopathy noted  SKIN: No rashes or lesions    Care Discussed with Consultants/Other Providers [ ] YES  [ ] NO

## 2018-03-01 NOTE — CHART NOTE - NSCHARTNOTEFT_GEN_A_CORE
Notified by RN that patient doesn’t take lisinopril at home and takes Amlodipine 5 mg daily. Lisonpril discontinued. Home medications reviewed with patient  .Reviewed neuro recommendations , increased dose of Ganbapentin to 300 mg po TID .Patient reported  no longer takes Lipitor . Lipid panel ordered. F/u lipid profile .

## 2018-03-02 LAB
BUN SERPL-MCNC: 14 MG/DL — SIGNIFICANT CHANGE UP (ref 7–23)
CALCIUM SERPL-MCNC: 8.5 MG/DL — SIGNIFICANT CHANGE UP (ref 8.4–10.5)
CHLORIDE SERPL-SCNC: 104 MMOL/L — SIGNIFICANT CHANGE UP (ref 98–107)
CO2 SERPL-SCNC: 25 MMOL/L — SIGNIFICANT CHANGE UP (ref 22–31)
CREAT SERPL-MCNC: 0.7 MG/DL — SIGNIFICANT CHANGE UP (ref 0.5–1.3)
GLUCOSE SERPL-MCNC: 122 MG/DL — HIGH (ref 70–99)
POTASSIUM SERPL-MCNC: 3.6 MMOL/L — SIGNIFICANT CHANGE UP (ref 3.5–5.3)
POTASSIUM SERPL-SCNC: 3.6 MMOL/L — SIGNIFICANT CHANGE UP (ref 3.5–5.3)
SODIUM SERPL-SCNC: 141 MMOL/L — SIGNIFICANT CHANGE UP (ref 135–145)

## 2018-03-02 PROCEDURE — 95819 EEG AWAKE AND ASLEEP: CPT | Mod: 26

## 2018-03-02 RX ORDER — DIPHENHYDRAMINE HCL 50 MG
25 CAPSULE ORAL ONCE
Qty: 0 | Refills: 0 | Status: COMPLETED | OUTPATIENT
Start: 2018-03-02 | End: 2018-03-02

## 2018-03-02 RX ORDER — METOCLOPRAMIDE HCL 10 MG
10 TABLET ORAL ONCE
Qty: 0 | Refills: 0 | Status: COMPLETED | OUTPATIENT
Start: 2018-03-02 | End: 2018-03-02

## 2018-03-02 RX ORDER — VALPROIC ACID (AS SODIUM SALT) 250 MG/5ML
500 SOLUTION, ORAL ORAL EVERY 12 HOURS
Qty: 0 | Refills: 0 | Status: DISCONTINUED | OUTPATIENT
Start: 2018-03-02 | End: 2018-03-03

## 2018-03-02 RX ORDER — METOCLOPRAMIDE HCL 10 MG
10 TABLET ORAL EVERY 8 HOURS
Qty: 0 | Refills: 0 | Status: DISCONTINUED | OUTPATIENT
Start: 2018-03-02 | End: 2018-03-03

## 2018-03-02 RX ADMIN — Medication 650 MILLIGRAM(S): at 11:20

## 2018-03-02 RX ADMIN — AMLODIPINE BESYLATE 5 MILLIGRAM(S): 2.5 TABLET ORAL at 06:00

## 2018-03-02 RX ADMIN — SODIUM CHLORIDE 100 MILLILITER(S): 9 INJECTION INTRAMUSCULAR; INTRAVENOUS; SUBCUTANEOUS at 22:50

## 2018-03-02 RX ADMIN — Medication 27.5 MILLIGRAM(S): at 22:50

## 2018-03-02 RX ADMIN — Medication 104 MILLIGRAM(S): at 20:03

## 2018-03-02 RX ADMIN — PANTOPRAZOLE SODIUM 40 MILLIGRAM(S): 20 TABLET, DELAYED RELEASE ORAL at 06:00

## 2018-03-02 RX ADMIN — GABAPENTIN 300 MILLIGRAM(S): 400 CAPSULE ORAL at 06:00

## 2018-03-02 RX ADMIN — Medication 10 MILLIGRAM(S): at 15:26

## 2018-03-02 RX ADMIN — Medication 650 MILLIGRAM(S): at 04:16

## 2018-03-02 RX ADMIN — GABAPENTIN 300 MILLIGRAM(S): 400 CAPSULE ORAL at 22:50

## 2018-03-02 RX ADMIN — GABAPENTIN 300 MILLIGRAM(S): 400 CAPSULE ORAL at 13:13

## 2018-03-02 RX ADMIN — Medication 650 MILLIGRAM(S): at 05:16

## 2018-03-02 RX ADMIN — Medication 25 MILLIGRAM(S): at 20:03

## 2018-03-02 RX ADMIN — Medication 650 MILLIGRAM(S): at 10:30

## 2018-03-02 NOTE — EEG REPORT - NS EEG TEXT BOX
[facility]  Report of Routine EEG with Video    [facility]: [address], [address] [place] [address], Phone: [phone]  [facility]: [address] [place] [address], [place] [address], Phone: [phone]  Office: [address] [facility], [address], [place] [address], Phone: [phone]    Patient Name: STEVIE JACKSON    Age: 52 y  : 1965  Patient ID: -, MRN #: -, Location: [id number] B  Referring Physician: -    EEG #: [id number]  Study Date: 3/2/2018		    Technical Information:					  On Instrument: -  Placement and Labeling of Electrodes:  The EEG was performed utilizing 20 channels referential EEG connections (coronal over temporal over parasagittal montage) using all standard 10-20 electrode placements with EKG.  Recording was at a sampling rate of 256 samples per second per channel.  Time synchronized digital video recording was done simultaneously with EEG recording.  A low light infrared camera was used for low light recording.  Keny and seizure detection algorithms were utilized.    History:  53YO PRESENTS WITH SYNCOPE    -    Medication	  No Data.	    Study Interpretation:    FINDINGS:  The background was continuous, spontaneously variable and reactive.  During wakefulness, the posteriorly dominant rhythm consisted of symmetric, well-modulated 10 Hz activity, with amplitude to 30 uV, that attenuated to eye opening.  Low amplitude frontal beta was noted in wakefulness.    Background Slowing:  Generalized slowing: none was present.  Focal slowing: none was present.    Sleep Background:  Drowsiness was characterized by fragmentation, attenuation, and slowing of the background activity.    Stage II sleep transients were not recorded.    Other Paroxysmal Activity:  None     Interictal Epileptiform Activity:   No epileptiform discharges were present.    Ictal Epileptiform Activity or Events:  No clinical events were recorded.  No seizures were recorded.    Activation Procedures:   Hyperventilation was not performed.    Photic stimulation was not performed.    Artifacts:  Intermittent myogenic and movement artifacts were noted.    ECG:  The heart rate on single channel ECG was predominantly between 50-60 BPM.    EEG Classification / Summary:  Normal EEG in the awake and drowsy states  -  Clinical Impression:    There were no epileptiform abnormalities recorded.          Jessica Jennings DO   Neurophysiology/Epilepsy Fellow, [facility] [place] [facility]  Report of Routine EEG with Video    [facility]: [address] [place] [address], Phone: [phone]  [facility]: [address], [place] [address], Phone: [phone]  Office: [address] [facility], [place] [address], Phone: [phone]    Patient Name: STEVIE JACKSON    Age: 52 y  : 1965  Patient ID: -, MRN #: -, Location: [id number] B  Referring Physician: -    EEG #: [id number]  Study Date: 3/2/2018		    Technical Information:					  On Instrument: -  Placement and Labeling of Electrodes:  The EEG was performed utilizing 20 channels referential EEG connections (coronal over temporal over parasagittal montage) using all standard 10-20 electrode placements with EKG.  Recording was at a sampling rate of 256 samples per second per channel.  Time synchronized digital video recording was done simultaneously with EEG recording.  A low light infrared camera was used for low light recording.  Keny and seizure detection algorithms were utilized.    History:  53YO PRESENTS WITH SYNCOPE    -    Medication	  No Data.	    Study Interpretation:    FINDINGS:  The background was continuous, spontaneously variable and reactive.  During wakefulness, the posteriorly dominant rhythm consisted of symmetric, well-modulated 10 Hz activity, with amplitude to 30 uV, that attenuated to eye opening.  Low amplitude frontal beta was noted in wakefulness.    Background Slowing:  Generalized slowing: none was present.  Focal slowing: none was present.    Sleep Background:  Stage II sleep transients were not recorded.    Other Paroxysmal Activity:  None     Interictal Epileptiform Activity:   No epileptiform discharges were present.    Ictal Epileptiform Activity or Events:  No clinical events were recorded.  No seizures were recorded.    Activation Procedures:   Hyperventilation was not performed.    Photic stimulation was not performed.    Artifacts:  Intermittent myogenic and movement artifacts were noted.    ECG:  The heart rate on single channel ECG was predominantly between 50-60 BPM.    EEG Classification / Summary:  Normal EEG in the awake  states  -  Clinical Impression:    There were no epileptiform abnormalities recorded.          Jessica Jennings DO   Neurophysiology/Epilepsy Fellow, [facility] [place] [facility]  Report of Routine EEG with Video    [facility]: [address], [address] [place] [address], Phone: [phone]  [facility]: [phone], [place] [address], Phone: [phone]  Office: [address] [facility] [address], [place] [address], Phone: [phone]    Patient Name: STEVIE JACKSON    Age: 52 y  : 1965  Patient ID: -, MRN #: -, Location: 809 B  Referring Physician: -    EEG #: [id number]  Study Date: 3/2/2018		    Technical Information:					  On Instrument: -  Placement and Labeling of Electrodes:  The EEG was performed utilizing 20 channels referential EEG connections (coronal over temporal over parasagittal montage) using all standard 10-20 electrode placements with EKG.  Recording was at a sampling rate of 256 samples per second per channel.  Time synchronized digital video recording was done simultaneously with EEG recording.  A low light infrared camera was used for low light recording.  Keny and seizure detection algorithms were utilized.    History:  53YO PRESENTS WITH SYNCOPE    -    Medication	  No Data.	    Study Interpretation:    FINDINGS:  The background was continuous, spontaneously variable and reactive.  During wakefulness, the posteriorly dominant rhythm consisted of symmetric, well-modulated 10 Hz activity, with amplitude to 30 uV, that attenuated to eye opening.  Low amplitude frontal beta was noted in wakefulness.    Background Slowing:  Generalized slowing: none was present.  Focal slowing: none was present.    Sleep Background:  Stage II sleep transients were not recorded.    Other Paroxysmal Activity:  None     Interictal Epileptiform Activity:   No epileptiform discharges were present.    Ictal Epileptiform Activity or Events:  No clinical events were recorded.  No seizures were recorded.    Activation Procedures:   Hyperventilation was not performed.    Photic stimulation was not performed.    Artifacts:  Intermittent myogenic and movement artifacts were noted.    ECG:  The heart rate on single channel ECG was predominantly between 50-60 BPM.    EEG Classification / Summary:  Normal EEG in the awake  states  -  Clinical Impression:    There were no epileptiform abnormalities recorded.          Jessica Jeninngs DO   Neurophysiology/Epilepsy Fellow, [facility] [place]    Karson Salazar MD  EEG / Epilepsy Attending Physician

## 2018-03-02 NOTE — PROGRESS NOTE ADULT - SUBJECTIVE AND OBJECTIVE BOX
Patient is a 52y old  Female who presents with a chief complaint of c/o headache,Lt side weakness, burning sensation ,numbness (28 Feb 2018 22:44)  still with severe headache      INTERVAL HPI/OVERNIGHT EVENTS:  T(C): 36.4 (03-02-18 @ 15:17), Max: 36.7 (03-01-18 @ 21:40)  HR: 50 (03-02-18 @ 15:17) (50 - 58)  BP: 122/76 (03-02-18 @ 15:17) (114/71 - 124/76)  RR: 18 (03-02-18 @ 05:15) (18 - 18)  SpO2: 90% (03-02-18 @ 15:17) (90% - 98%)  Wt(kg): --  I&O's Summary      LABS:                        13.3   5.40  )-----------( 246      ( 01 Mar 2018 07:05 )             41.0     03-02    141  |  104  |  14  ----------------------------<  122<H>  3.6   |  25  |  0.70    Ca    8.5      02 Mar 2018 06:00  Phos  3.6     03-01  Mg     1.9     03-01    TPro  6.7  /  Alb  4.0  /  TBili  0.6  /  DBili  x   /  AST  15  /  ALT  14  /  AlkPhos  57  03-01        CAPILLARY BLOOD GLUCOSE                MEDICATIONS  (STANDING):  amLODIPine   Tablet 5 milliGRAM(s) Oral daily  gabapentin 300 milliGRAM(s) Oral three times a day  pantoprazole    Tablet 40 milliGRAM(s) Oral before breakfast  sodium chloride 0.9%. 1000 milliLiter(s) (100 mL/Hr) IV Continuous <Continuous>    MEDICATIONS  (PRN):  acetaminophen   Tablet. 650 milliGRAM(s) Oral every 6 hours PRN headache          PHYSICAL EXAM:  GENERAL: NAD, well-groomed, well-developed  HEAD:  Atraumatic, Normocephalic  CHEST/LUNG: Clear to percussion bilaterally; No rales, rhonchi, wheezing, or rubs  HEART: Regular rate and rhythm; No murmurs, rubs, or gallops  ABDOMEN: Soft, Nontender, Nondistended; Bowel sounds present  EXTREMITIES:  2+ Peripheral Pulses, No clubbing, cyanosis, or edema  LYMPH: No lymphadenopathy noted  SKIN: No rashes or lesions    Care Discussed with Consultants/Other Providers [+ ] YES  [ ] NO

## 2018-03-02 NOTE — PROGRESS NOTE ADULT - SUBJECTIVE AND OBJECTIVE BOX
[facility]        - Patient seen and examined.  - Today, patient is without complaints. still complaining of headache that is worse with sitting up, or standing.          *****MEDICATIONS: Current medication reviewed and documented.    MEDICATIONS  (STANDING):  amLODIPine   Tablet 5 milliGRAM(s) Oral daily  gabapentin 300 milliGRAM(s) Oral three times a day  pantoprazole    Tablet 40 milliGRAM(s) Oral before breakfast  sodium chloride 0.9%. 1000 milliLiter(s) (100 mL/Hr) IV Continuous <Continuous>    MEDICATIONS  (PRN):  acetaminophen   Tablet. 650 milliGRAM(s) Oral every 6 hours PRN headache  metoclopramide  IVPB 10 milliGRAM(s) IV Intermittent every 8 hours PRN headache  valproate sodium IVPB 500 milliGRAM(s) IV Intermittent every 12 hours PRN headache           ***** REVIEW OF SYSTEM:  GEN: no fever, no chills, no pain  RESP: no SOB, no cough, no sputum  CVS: no chest pain, no palpitations, no edema  GI: no abdominal pain, no nausea, no vomiting, no constipation, no diarrhea  : no dysurea, no frequency  NEURO: no headache, no diziness  PSYCH: no depression, not anxious  Derm : no itching, no rash         ***** VITAL SIGNS:  T(F): 97.6 (18 @ 15:17), Max: 98 (18 @ 05:15)  HR: 50 (18 @ 15:17) (50 - 52)  BP: 122/76 (18 @ 15:17) (122/76 - 124/76)  RR: 18 (18 @ 05:15) (18 - 18)  SpO2: 90% (18 @ 15:17) (90% - 98%)  Wt(kg): --  ,   I&O's Summary           *****PHYSICAL EXAM:on my arrival pt sleeping comfortably.    alert oriented x 3 attention comprehension are fair.  Able to name, repeat.   EOmi fundi not visualized   no nystagmus VFF to confrontation  Tongue is midline  Palate elevates symmetrically   Moving all 4 ext spontaneously no drift appreciated    Gait not assessed.            *****LAB AND IMAGIN.3   5.40  )-----------( 246      ( 01 Mar 2018 07:05 )             41.0               03-    141  |  104  |  14  ----------------------------<  122<H>  3.6   |  25  |  0.70    Ca    8.5      02 Mar 2018 06:00  Phos  3.6     03-  Mg     1.9     -    TPro  6.7  /  Alb  4.0  /  TBili  0.6  /  DBili  x   /  AST  15  /  ALT  14  /  AlkPhos  57                           [All pertinent recent Imaging/Reports reviewed]           *****A S S E S S M E N T   A N D   P L A N :        51 y/o female with pmhx of ICH (residual left sided weakness/numbness and hyperacusis, baseline difficulty findings words), HTN, GERD, neuropathy presents to ED for gradual onset right sided "pressure-like" nonexertional headache since 8:30pm last night. Now migrating to left side. Intermittent, found relief with Tylenol last night, now constant for last hour. Associated with nausea, photophobia and worsening left hyperacusis, 8/10. States she felt tired and had difficulty concentrating at work today. Sent in by her neurologist Dr. Castellon. Did not take any pain meds today. Pt states takes Gabapentin for left sided UE/LE numbness and feels worse x 2 days. Pt also admitted to left sided chest "pressure" x few hours. Associated with "feeling harder to breathe" although denies SOB. States different than baseline GERD. 8/10. Nonexertional, nonpleuritic, nonradiating. Fam hx of MI mother at age 80. Never had stress test, had normal echo in past. No recent travel, surgeries, ocp use, IUDs, immobilization/recent hospitalization. No fever, chills, palpitations, cough, abd pain, vomiting, new weakness or numbness   called for second opinion,   She describes headache started on , and a progression of her burning sensation of her leg was noted. She describes a progression into her arms. She has never had seizures. She reports all her baseline symptoms of hyperacusis, burning sensation and difficulty understanding spoken word, seemed to have worsened with the headache She had a spinal tap  Lp unremarkable. no evidence of xanthochromia.  MRI reviewed with Dr. Baeza neuroradiology, who felt that there is no acute process goingon     after spinal tap, she had a orthostatic headache, treated with caffeine without any improvement.   Pt reports poor po intake over the last 48 hrs.  She still reports that the burning sensation is worse.  EEG no sz.   fluid hydration.  I went over options with her  to treat these heaaches,  with caffeine, depakote, reglan and benadryl vs. trying blood patch.  She was somewhat reluctant to try blood patch. She wanted to try noninvaisve methods first over the weekend,  before proceeding to blood patch.  Will try to call anesthesia in am to set up blood patch if there is no resolution of headache.       Thank you for allowing me to participate in the care of this patient. Please do not hesitate to call me if you have any  questions.        ________________  Chelsey Nathan MD  [facility] ([facility]  [phone]     30 minutes spent on total encounter; more than 50 % of the visit was  spent counseling and or  coordinating care by the attending physician.   At the present time, [facility] does not  provide outpatient followup, best to call the your insurance to find a participating provider.  This was explained to you at the time of the visit. Alternatively, if your insurance allows it, you can follow up with a neurologist  Dr. Joe Mario [phone].

## 2018-03-03 LAB
BUN SERPL-MCNC: 7 MG/DL — SIGNIFICANT CHANGE UP (ref 7–23)
CALCIUM SERPL-MCNC: 8.7 MG/DL — SIGNIFICANT CHANGE UP (ref 8.4–10.5)
CHLORIDE SERPL-SCNC: 105 MMOL/L — SIGNIFICANT CHANGE UP (ref 98–107)
CO2 SERPL-SCNC: 24 MMOL/L — SIGNIFICANT CHANGE UP (ref 22–31)
CREAT SERPL-MCNC: 0.61 MG/DL — SIGNIFICANT CHANGE UP (ref 0.5–1.3)
GLUCOSE SERPL-MCNC: 98 MG/DL — SIGNIFICANT CHANGE UP (ref 70–99)
HCT VFR BLD CALC: 37 % — SIGNIFICANT CHANGE UP (ref 34.5–45)
HGB BLD-MCNC: 12.5 G/DL — SIGNIFICANT CHANGE UP (ref 11.5–15.5)
MCHC RBC-ENTMCNC: 29.2 PG — SIGNIFICANT CHANGE UP (ref 27–34)
MCHC RBC-ENTMCNC: 33.8 % — SIGNIFICANT CHANGE UP (ref 32–36)
MCV RBC AUTO: 86.4 FL — SIGNIFICANT CHANGE UP (ref 80–100)
NRBC # FLD: 0 — SIGNIFICANT CHANGE UP
PLATELET # BLD AUTO: 211 K/UL — SIGNIFICANT CHANGE UP (ref 150–400)
PMV BLD: 11 FL — SIGNIFICANT CHANGE UP (ref 7–13)
POTASSIUM SERPL-MCNC: 3.4 MMOL/L — LOW (ref 3.5–5.3)
POTASSIUM SERPL-SCNC: 3.4 MMOL/L — LOW (ref 3.5–5.3)
RBC # BLD: 4.28 M/UL — SIGNIFICANT CHANGE UP (ref 3.8–5.2)
RBC # FLD: 13.2 % — SIGNIFICANT CHANGE UP (ref 10.3–14.5)
SODIUM SERPL-SCNC: 142 MMOL/L — SIGNIFICANT CHANGE UP (ref 135–145)
WBC # BLD: 7.89 K/UL — SIGNIFICANT CHANGE UP (ref 3.8–10.5)
WBC # FLD AUTO: 7.89 K/UL — SIGNIFICANT CHANGE UP (ref 3.8–10.5)

## 2018-03-03 RX ORDER — CAFFEINE/SODIUM BENZOATE 250 MG/ML
500 VIAL (ML) INJECTION ONCE
Qty: 0 | Refills: 0 | Status: COMPLETED | OUTPATIENT
Start: 2018-03-03 | End: 2018-03-03

## 2018-03-03 RX ORDER — METOCLOPRAMIDE HCL 10 MG
10 TABLET ORAL EVERY 8 HOURS
Qty: 0 | Refills: 0 | Status: DISCONTINUED | OUTPATIENT
Start: 2018-03-03 | End: 2018-03-07

## 2018-03-03 RX ORDER — ACETAMINOPHEN 500 MG
1000 TABLET ORAL ONCE
Qty: 0 | Refills: 0 | Status: COMPLETED | OUTPATIENT
Start: 2018-03-03 | End: 2018-03-03

## 2018-03-03 RX ORDER — POTASSIUM CHLORIDE 20 MEQ
40 PACKET (EA) ORAL ONCE
Qty: 0 | Refills: 0 | Status: COMPLETED | OUTPATIENT
Start: 2018-03-03 | End: 2018-03-03

## 2018-03-03 RX ORDER — CAFFEINE/SODIUM BENZOATE 250 MG/ML
500 VIAL (ML) INJECTION
Qty: 0 | Refills: 0 | Status: COMPLETED | OUTPATIENT
Start: 2018-03-04 | End: 2018-03-04

## 2018-03-03 RX ORDER — CAFFEINE/SODIUM BENZOATE 250 MG/ML
500 VIAL (ML) INJECTION
Qty: 0 | Refills: 0 | Status: COMPLETED | OUTPATIENT
Start: 2018-03-03 | End: 2018-03-03

## 2018-03-03 RX ORDER — VALPROIC ACID (AS SODIUM SALT) 250 MG/5ML
500 SOLUTION, ORAL ORAL ONCE
Qty: 0 | Refills: 0 | Status: COMPLETED | OUTPATIENT
Start: 2018-03-03 | End: 2018-03-03

## 2018-03-03 RX ORDER — ACETAMINOPHEN 500 MG
1000 TABLET ORAL EVERY 6 HOURS
Qty: 0 | Refills: 0 | Status: COMPLETED | OUTPATIENT
Start: 2018-03-03 | End: 2018-03-04

## 2018-03-03 RX ADMIN — PANTOPRAZOLE SODIUM 40 MILLIGRAM(S): 20 TABLET, DELAYED RELEASE ORAL at 07:06

## 2018-03-03 RX ADMIN — GABAPENTIN 300 MILLIGRAM(S): 400 CAPSULE ORAL at 07:06

## 2018-03-03 RX ADMIN — Medication 1000 MILLIGRAM(S): at 16:09

## 2018-03-03 RX ADMIN — Medication 40 MILLIEQUIVALENT(S): at 10:32

## 2018-03-03 RX ADMIN — Medication 10 MILLIGRAM(S): at 10:32

## 2018-03-03 RX ADMIN — Medication 400 MILLIGRAM(S): at 16:09

## 2018-03-03 RX ADMIN — GABAPENTIN 300 MILLIGRAM(S): 400 CAPSULE ORAL at 13:09

## 2018-03-03 RX ADMIN — SODIUM CHLORIDE 100 MILLILITER(S): 9 INJECTION INTRAMUSCULAR; INTRAVENOUS; SUBCUTANEOUS at 20:43

## 2018-03-03 RX ADMIN — Medication 1002 MILLIGRAM(S): at 18:51

## 2018-03-03 RX ADMIN — Medication 27.5 MILLIGRAM(S): at 20:43

## 2018-03-03 RX ADMIN — AMLODIPINE BESYLATE 5 MILLIGRAM(S): 2.5 TABLET ORAL at 07:06

## 2018-03-03 RX ADMIN — Medication 1002 MILLIGRAM(S): at 13:09

## 2018-03-03 RX ADMIN — GABAPENTIN 300 MILLIGRAM(S): 400 CAPSULE ORAL at 22:26

## 2018-03-03 RX ADMIN — Medication 52 MILLIGRAM(S): at 07:06

## 2018-03-03 RX ADMIN — SODIUM CHLORIDE 100 MILLILITER(S): 9 INJECTION INTRAMUSCULAR; INTRAVENOUS; SUBCUTANEOUS at 15:31

## 2018-03-03 NOTE — PROGRESS NOTE ADULT - SUBJECTIVE AND OBJECTIVE BOX
Pt with history of ICH,  headache, LBP ans sciatica, s/p spinal tap and now PDPH. Pt reports pain 6/10 , but able to ambulate and tolerates food. Pt agrees to continue conservative tx of PDPH and doesnt want blood patch at this time . I discussed options with patient and neurologist and agree to continue treating it with fluids and pain meds.

## 2018-03-03 NOTE — PROGRESS NOTE ADULT - SUBJECTIVE AND OBJECTIVE BOX
[facility]        - Patient seen and examined.  - Today, patient is without complaints.         *****MEDICATIONS: Current medication reviewed and documented.    MEDICATIONS  (STANDING):  amLODIPine   Tablet 5 milliGRAM(s) Oral daily  gabapentin 300 milliGRAM(s) Oral three times a day  pantoprazole    Tablet 40 milliGRAM(s) Oral before breakfast  sodium chloride 0.9%. 1000 milliLiter(s) (100 mL/Hr) IV Continuous <Continuous>    MEDICATIONS  (PRN):  acetaminophen   Tablet. 650 milliGRAM(s) Oral every 6 hours PRN headache  metoclopramide Injectable 10 milliGRAM(s) IV Push every 8 hours PRN Headache  valproate sodium IVPB 500 milliGRAM(s) IV Intermittent every 12 hours PRN headache           ***** REVIEW OF SYSTEM:  GEN: no fever, no chills, no pain  RESP: no SOB, no cough, no sputum  CVS: no chest pain, no palpitations, no edema  GI: no abdominal pain, no nausea, no vomiting, no constipation, no diarrhea  : no dysurea, no frequency  NEURO: no headache, no diziness  PSYCH: no depression, not anxious  Derm : no itching, no rash         ***** VITAL SIGNS:  T(F): 97.4 (18 @ 15:25), Max: 98.3 (18 @ 05:45)  HR: 65 (18 @ 15:25) (50 - 65)  BP: 133/91 (18 @ 15:25) (104/65 - 133/91)  RR: 20 (18 @ 15:25) (18 - 20)  SpO2: 99% (18 @ 15:25) (97% - 99%)  Wt(kg): --  ,   I&O's Summary           *****PHYSICAL EXAM:   alert oriented x 3 attention comprehension are fair.  Able to name, repeat.   EOmi fundi not visualized   no nystagmus VFF to confrontation  Tongue is midline  Palate elevates symmetrically   Moving all 4 ext spontaneously no drift appreciated    Gait not assessed.            *****LAB AND IMAGIN.5   7.89  )-----------( 211      ( 03 Mar 2018 05:30 )             37.0               03-    142  |  105  |  7   ----------------------------<  98  3.4<L>   |  24  |  0.61    Ca    8.7      03 Mar 2018 05:30                           [All pertinent recent Imaging/Reports reviewed]           *****A S S E S S M E N T   A N D   P L A N :          Thank you for allowing me to participate in the care of this patient. Please do not hesitate to call me if you have any  questions.        ________________  Chelsey Nathan MD  [facility] ([facility]  [phone]     30 minutes spent on total encounter; more than 50 % of the visit was  spent counseling and or  coordinating care by the attending physician.   At the present time, [facility] does not  provide outpatient followup, best to call the your insurance to find a participating provider.  This was explained to you at the time of the visit. Alternatively, if your insurance allows it, you can follow up with a neurologist  Dr. Joe Mario [phone]. [facility]        - Patient seen and examined.  - Today, patient is without complaints.         *****MEDICATIONS: Current medication reviewed and documented.    MEDICATIONS  (STANDING):  amLODIPine   Tablet 5 milliGRAM(s) Oral daily  gabapentin 300 milliGRAM(s) Oral three times a day  pantoprazole    Tablet 40 milliGRAM(s) Oral before breakfast  sodium chloride 0.9%. 1000 milliLiter(s) (100 mL/Hr) IV Continuous <Continuous>    MEDICATIONS  (PRN):  acetaminophen   Tablet. 650 milliGRAM(s) Oral every 6 hours PRN headache  metoclopramide Injectable 10 milliGRAM(s) IV Push every 8 hours PRN Headache  valproate sodium IVPB 500 milliGRAM(s) IV Intermittent every 12 hours PRN headache           ***** REVIEW OF SYSTEM:  GEN: no fever, no chills, no pain  RESP: no SOB, no cough, no sputum  CVS: no chest pain, no palpitations, no edema  GI: no abdominal pain, no nausea, no vomiting, no constipation, no diarrhea  : no dysurea, no frequency  NEURO: no headache, no diziness  PSYCH: no depression, not anxious  Derm : no itching, no rash         ***** VITAL SIGNS:  T(F): 97.4 (18 @ 15:25), Max: 98.3 (18 @ 05:45)  HR: 65 (18 @ 15:25) (50 - 65)  BP: 133/91 (18 @ 15:25) (104/65 - 133/91)  RR: 20 (18 @ 15:25) (18 - 20)  SpO2: 99% (18 @ 15:25) (97% - 99%)  Wt(kg): --  ,   I&O's Summary           *****PHYSICAL EXAM:   alert oriented x 3 attention comprehension are fair.  Able to name, repeat.   EOmi fundi not visualized   no nystagmus VFF to confrontation  Tongue is midline  Palate elevates symmetrically   Moving all 4 ext spontaneously no drift appreciated    Gait not assessed.            *****LAB AND IMAGIN.5   7.89  )-----------( 211      ( 03 Mar 2018 05:30 )             37.0               03-    142  |  105  |  7   ----------------------------<  98  3.4<L>   |  24  |  0.61    Ca    8.7      03 Mar 2018 05:30                           [All pertinent recent Imaging/Reports reviewed]           *****A S S E S S M E N T   A N D   P L A N :      53 y/o female with pmhx of ICH (residual left sided weakness/numbness and hyperacusis, baseline difficulty findings words), HTN, GERD, neuropathy presents to ED for gradual onset right sided "pressure-like" nonexertional headache since 8:30pm last night. Now migrating to left side. Intermittent, found relief with Tylenol last night, now constant for last hour. Associated with nausea, photophobia and worsening left hyperacusis, 8/10. States she felt tired and had difficulty concentrating at work today. Sent in by her neurologist Dr. Castellon. Did not take any pain meds today. Pt states takes Gabapentin for left sided UE/LE numbness and feels worse x 2 days. Pt also admitted to left sided chest "pressure" x few hours. Associated with "feeling harder to breathe" although denies SOB. States different than baseline GERD. 8/10. Nonexertional, nonpleuritic,   nonradiating. Fam hx of MI mother at age 80. Never had stress test, had normal echo in past. No recent travel, surgeries, ocp use, IUDs, immobilization/recent hospitalization. No fever, chills, palpitations, cough, abd pain, vomiting, new weakness or numbness   called for second opinion,   She describes headache started on , and a progression of her burning sensation of her leg was noted. She describes a progression into her arms. She has never had seizures. She reports all her baseline symptoms of hyperacusis, burning sensation and difficulty understanding spoken word, seemed to have worsened with the headache She had a spinal tap  Lp unremarkable. no evidence of xanthochromia.  MRI reviewed with Dr. Baeza neuroradiology, who felt that there is no acute process going on     after spinal tap, she had a orthostatic headache, concerning for post lp headache    Pt reports poor po intake over the last 48 hrs.     EEG no sz.   fluid hydration.  I went over options with her  to treat these heaaches,  with caffeine, depakote, reglan and benadryl vs. trying blood patch.  She was somewhat reluctant to try blood patch. She wanted to try noninvaisve methods first over the weekend,  before proceeding to blood patch.  called anesthesia for blood patch bc of no resolution of headache, however, pt was hesitant due to fear of worsening sciatica.  Continue meds as prescribed for headache.  will continue to monitor.       Thank you for allowing me to participate in the care of this patient. Please do not hesitate to call me if you have any  questions.        ________________  Chelsey Nathan MD  [facility] ([facility]  [phone]     30 minutes spent on total encounter; more than 50 % of the visit was  spent counseling and or  coordinating care by the attending physician.   At the present time, [facility] does not  provide outpatient followup, best to call the your insurance to find a participating provider.  This was explained to you at the time of the visit. Alternatively, if your insurance allows it, you can follow up with a neurologist  Dr. Joe Mario [phone].

## 2018-03-03 NOTE — PROGRESS NOTE ADULT - SUBJECTIVE AND OBJECTIVE BOX
Patient is a 52y old  Female who presents with a chief complaint of c/o headache,Lt side weakness, burning sensation ,numbness (28 Feb 2018 22:44)  still with severe HA and nausea      INTERVAL HPI/OVERNIGHT EVENTS:  T(C): 36.3 (03-03-18 @ 15:25), Max: 36.8 (03-03-18 @ 05:45)  HR: 65 (03-03-18 @ 15:25) (50 - 65)  BP: 133/91 (03-03-18 @ 15:25) (104/65 - 133/91)  RR: 20 (03-03-18 @ 15:25) (18 - 20)  SpO2: 99% (03-03-18 @ 15:25) (97% - 99%)  Wt(kg): --  I&O's Summary      LABS:                        12.5   7.89  )-----------( 211      ( 03 Mar 2018 05:30 )             37.0     03-03    142  |  105  |  7   ----------------------------<  98  3.4<L>   |  24  |  0.61    Ca    8.7      03 Mar 2018 05:30          CAPILLARY BLOOD GLUCOSE                MEDICATIONS  (STANDING):  amLODIPine   Tablet 5 milliGRAM(s) Oral daily  gabapentin 300 milliGRAM(s) Oral three times a day  pantoprazole    Tablet 40 milliGRAM(s) Oral before breakfast  sodium chloride 0.9%. 1000 milliLiter(s) (100 mL/Hr) IV Continuous <Continuous>    MEDICATIONS  (PRN):  acetaminophen   Tablet. 650 milliGRAM(s) Oral every 6 hours PRN headache  metoclopramide Injectable 10 milliGRAM(s) IV Push every 8 hours PRN Headache  valproate sodium IVPB 500 milliGRAM(s) IV Intermittent every 12 hours PRN headache          PHYSICAL EXAM:  GENERAL: NAD, well-groomed, well-developed  HEAD:  Atraumatic, Normocephalic  CHEST/LUNG: Clear to percussion bilaterally; No rales, rhonchi, wheezing, or rubs  HEART: Regular rate and rhythm; No murmurs, rubs, or gallops  ABDOMEN: Soft, Nontender, Nondistended; Bowel sounds present  EXTREMITIES:  2+ Peripheral Pulses, No clubbing, cyanosis, or edema  LYMPH: No lymphadenopathy noted  SKIN: No rashes or lesions    Care Discussed with Consultants/Other Providers [+ ] YES  [ ] NO

## 2018-03-04 LAB
BUN SERPL-MCNC: 7 MG/DL — SIGNIFICANT CHANGE UP (ref 7–23)
CALCIUM SERPL-MCNC: 8.6 MG/DL — SIGNIFICANT CHANGE UP (ref 8.4–10.5)
CHLORIDE SERPL-SCNC: 106 MMOL/L — SIGNIFICANT CHANGE UP (ref 98–107)
CO2 SERPL-SCNC: 22 MMOL/L — SIGNIFICANT CHANGE UP (ref 22–31)
CREAT SERPL-MCNC: 0.7 MG/DL — SIGNIFICANT CHANGE UP (ref 0.5–1.3)
GLUCOSE SERPL-MCNC: 108 MG/DL — HIGH (ref 70–99)
HCT VFR BLD CALC: 40.2 % — SIGNIFICANT CHANGE UP (ref 34.5–45)
HGB BLD-MCNC: 12.9 G/DL — SIGNIFICANT CHANGE UP (ref 11.5–15.5)
MCHC RBC-ENTMCNC: 27.7 PG — SIGNIFICANT CHANGE UP (ref 27–34)
MCHC RBC-ENTMCNC: 32.1 % — SIGNIFICANT CHANGE UP (ref 32–36)
MCV RBC AUTO: 86.5 FL — SIGNIFICANT CHANGE UP (ref 80–100)
NRBC # FLD: 0 — SIGNIFICANT CHANGE UP
PLATELET # BLD AUTO: 250 K/UL — SIGNIFICANT CHANGE UP (ref 150–400)
PMV BLD: 11 FL — SIGNIFICANT CHANGE UP (ref 7–13)
POTASSIUM SERPL-MCNC: 3.5 MMOL/L — SIGNIFICANT CHANGE UP (ref 3.5–5.3)
POTASSIUM SERPL-SCNC: 3.5 MMOL/L — SIGNIFICANT CHANGE UP (ref 3.5–5.3)
RBC # BLD: 4.65 M/UL — SIGNIFICANT CHANGE UP (ref 3.8–5.2)
RBC # FLD: 13.4 % — SIGNIFICANT CHANGE UP (ref 10.3–14.5)
SODIUM SERPL-SCNC: 144 MMOL/L — SIGNIFICANT CHANGE UP (ref 135–145)
WBC # BLD: 11.06 K/UL — HIGH (ref 3.8–10.5)
WBC # FLD AUTO: 11.06 K/UL — HIGH (ref 3.8–10.5)

## 2018-03-04 RX ORDER — ACETAMINOPHEN 500 MG
1000 TABLET ORAL ONCE
Qty: 0 | Refills: 0 | Status: COMPLETED | OUTPATIENT
Start: 2018-03-04 | End: 2018-03-04

## 2018-03-04 RX ORDER — LANOLIN ALCOHOL/MO/W.PET/CERES
3 CREAM (GRAM) TOPICAL ONCE
Qty: 0 | Refills: 0 | Status: COMPLETED | OUTPATIENT
Start: 2018-03-04 | End: 2018-03-04

## 2018-03-04 RX ADMIN — Medication 1000 MILLIGRAM(S): at 01:05

## 2018-03-04 RX ADMIN — PANTOPRAZOLE SODIUM 40 MILLIGRAM(S): 20 TABLET, DELAYED RELEASE ORAL at 06:56

## 2018-03-04 RX ADMIN — Medication 1002 MILLIGRAM(S): at 19:43

## 2018-03-04 RX ADMIN — Medication 1000 MILLIGRAM(S): at 18:04

## 2018-03-04 RX ADMIN — GABAPENTIN 300 MILLIGRAM(S): 400 CAPSULE ORAL at 14:46

## 2018-03-04 RX ADMIN — GABAPENTIN 300 MILLIGRAM(S): 400 CAPSULE ORAL at 06:56

## 2018-03-04 RX ADMIN — SODIUM CHLORIDE 100 MILLILITER(S): 9 INJECTION INTRAMUSCULAR; INTRAVENOUS; SUBCUTANEOUS at 21:29

## 2018-03-04 RX ADMIN — AMLODIPINE BESYLATE 5 MILLIGRAM(S): 2.5 TABLET ORAL at 11:26

## 2018-03-04 RX ADMIN — Medication 1002 MILLIGRAM(S): at 08:46

## 2018-03-04 RX ADMIN — Medication 400 MILLIGRAM(S): at 00:49

## 2018-03-04 RX ADMIN — Medication 400 MILLIGRAM(S): at 18:03

## 2018-03-04 RX ADMIN — GABAPENTIN 300 MILLIGRAM(S): 400 CAPSULE ORAL at 21:29

## 2018-03-04 NOTE — PROGRESS NOTE ADULT - SUBJECTIVE AND OBJECTIVE BOX
Patient is a 52y old  Female who presents with a chief complaint of c/o headache,Lt side weakness, burning sensation ,numbness (28 Feb 2018 22:44)  much better, HA improved      INTERVAL HPI/OVERNIGHT EVENTS:  T(C): 36.8 (03-04-18 @ 06:52), Max: 37.2 (03-03-18 @ 21:52)  HR: 45 (03-04-18 @ 06:52) (45 - 65)  BP: 127/83 (03-04-18 @ 06:52) (127/83 - 133/91)  RR: 16 (03-04-18 @ 06:52) (16 - 20)  SpO2: 100% (03-04-18 @ 06:52) (95% - 100%)  Wt(kg): --  I&O's Summary      LABS:                        12.9   11.06 )-----------( 250      ( 04 Mar 2018 05:40 )             40.2     03-04    144  |  106  |  7   ----------------------------<  108<H>  3.5   |  22  |  0.70    Ca    8.6      04 Mar 2018 05:40          CAPILLARY BLOOD GLUCOSE                MEDICATIONS  (STANDING):  amLODIPine   Tablet 5 milliGRAM(s) Oral daily  caffeine/sodium benzoate IVPB 500 milliGRAM(s) IV Intermittent <User Schedule>  gabapentin 300 milliGRAM(s) Oral three times a day  pantoprazole    Tablet 40 milliGRAM(s) Oral before breakfast  sodium chloride 0.9%. 1000 milliLiter(s) (100 mL/Hr) IV Continuous <Continuous>    MEDICATIONS  (PRN):  metoclopramide Injectable 10 milliGRAM(s) IV Push every 8 hours PRN Headache          PHYSICAL EXAM:  GENERAL: NAD, well-groomed, well-developed  HEAD:  Atraumatic, Normocephalic  CHEST/LUNG: Clear to percussion bilaterally; No rales, rhonchi, wheezing, or rubs  HEART: Regular rate and rhythm; No murmurs, rubs, or gallops  ABDOMEN: Soft, Nontender, Nondistended; Bowel sounds present  EXTREMITIES:  2+ Peripheral Pulses, No clubbing, cyanosis, or edema  LYMPH: No lymphadenopathy noted  SKIN: No rashes or lesions    Care Discussed with Consultants/Other Providers [ ] YES  [ ] NO

## 2018-03-04 NOTE — CHART NOTE - NSCHARTNOTEFT_GEN_A_CORE
ADS NIGHT COVERAGE:    Notified by RN that pt c/o neck and back pain around site of LP. Pt seen at bedside. Pt states that her pain is not severe and is more concerned that she cannot fall asleep.   Pt is already received Tylenol IV and is on Gabapentin.   Melatonin 3mg x1 ordered.   Advised pt to let RN know if pain worsens, will re-evaluate and treat if needed.   Will continue to monitor.    Debby ADS PA-C  [phone]

## 2018-03-05 ENCOUNTER — TRANSCRIPTION ENCOUNTER (OUTPATIENT)
Age: 53
End: 2018-03-05

## 2018-03-05 LAB
BACTERIA CSF CULT: SIGNIFICANT CHANGE UP
BUN SERPL-MCNC: 9 MG/DL — SIGNIFICANT CHANGE UP (ref 7–23)
CALCIUM SERPL-MCNC: 8.5 MG/DL — SIGNIFICANT CHANGE UP (ref 8.4–10.5)
CHLORIDE SERPL-SCNC: 108 MMOL/L — HIGH (ref 98–107)
CK MB BLD-MCNC: 1.34 NG/ML — SIGNIFICANT CHANGE UP (ref 1–4.7)
CK SERPL-CCNC: 54 U/L — SIGNIFICANT CHANGE UP (ref 25–170)
CO2 SERPL-SCNC: 22 MMOL/L — SIGNIFICANT CHANGE UP (ref 22–31)
CREAT SERPL-MCNC: 0.65 MG/DL — SIGNIFICANT CHANGE UP (ref 0.5–1.3)
GLUCOSE SERPL-MCNC: 88 MG/DL — SIGNIFICANT CHANGE UP (ref 70–99)
HCT VFR BLD CALC: 36.5 % — SIGNIFICANT CHANGE UP (ref 34.5–45)
HGB BLD-MCNC: 11.9 G/DL — SIGNIFICANT CHANGE UP (ref 11.5–15.5)
MCHC RBC-ENTMCNC: 28.2 PG — SIGNIFICANT CHANGE UP (ref 27–34)
MCHC RBC-ENTMCNC: 32.6 % — SIGNIFICANT CHANGE UP (ref 32–36)
MCV RBC AUTO: 86.5 FL — SIGNIFICANT CHANGE UP (ref 80–100)
NRBC # FLD: 0 — SIGNIFICANT CHANGE UP
PLATELET # BLD AUTO: 203 K/UL — SIGNIFICANT CHANGE UP (ref 150–400)
PMV BLD: 11.5 FL — SIGNIFICANT CHANGE UP (ref 7–13)
POTASSIUM SERPL-MCNC: 3.7 MMOL/L — SIGNIFICANT CHANGE UP (ref 3.5–5.3)
POTASSIUM SERPL-SCNC: 3.7 MMOL/L — SIGNIFICANT CHANGE UP (ref 3.5–5.3)
RBC # BLD: 4.22 M/UL — SIGNIFICANT CHANGE UP (ref 3.8–5.2)
RBC # FLD: 13.8 % — SIGNIFICANT CHANGE UP (ref 10.3–14.5)
SODIUM SERPL-SCNC: 145 MMOL/L — SIGNIFICANT CHANGE UP (ref 135–145)
TROPONIN T SERPL-MCNC: < 0.06 NG/ML — SIGNIFICANT CHANGE UP (ref 0–0.06)
TROPONIN T SERPL-MCNC: < 0.06 NG/ML — SIGNIFICANT CHANGE UP (ref 0–0.06)
WBC # BLD: 8.63 K/UL — SIGNIFICANT CHANGE UP (ref 3.8–10.5)
WBC # FLD AUTO: 8.63 K/UL — SIGNIFICANT CHANGE UP (ref 3.8–10.5)

## 2018-03-05 PROCEDURE — 93010 ELECTROCARDIOGRAM REPORT: CPT

## 2018-03-05 RX ORDER — DIVALPROEX SODIUM 500 MG/1
250 TABLET, DELAYED RELEASE ORAL
Qty: 0 | Refills: 0 | Status: DISCONTINUED | OUTPATIENT
Start: 2018-03-05 | End: 2018-03-07

## 2018-03-05 RX ORDER — CAFFEINE/SODIUM BENZOATE 250 MG/ML
500 VIAL (ML) INJECTION
Qty: 0 | Refills: 0 | Status: DISCONTINUED | OUTPATIENT
Start: 2018-03-05 | End: 2018-03-05

## 2018-03-05 RX ORDER — CAFFEINE/SODIUM BENZOATE 250 MG/ML
500 VIAL (ML) INJECTION
Qty: 0 | Refills: 0 | Status: COMPLETED | OUTPATIENT
Start: 2018-03-05 | End: 2018-03-05

## 2018-03-05 RX ORDER — OXYCODONE HYDROCHLORIDE 5 MG/1
5 TABLET ORAL ONCE
Qty: 0 | Refills: 0 | Status: DISCONTINUED | OUTPATIENT
Start: 2018-03-05 | End: 2018-03-05

## 2018-03-05 RX ORDER — ACETAMINOPHEN 500 MG
650 TABLET ORAL EVERY 6 HOURS
Qty: 0 | Refills: 0 | Status: DISCONTINUED | OUTPATIENT
Start: 2018-03-05 | End: 2018-03-07

## 2018-03-05 RX ADMIN — AMLODIPINE BESYLATE 5 MILLIGRAM(S): 2.5 TABLET ORAL at 05:29

## 2018-03-05 RX ADMIN — Medication 1002 MILLIGRAM(S): at 06:26

## 2018-03-05 RX ADMIN — GABAPENTIN 300 MILLIGRAM(S): 400 CAPSULE ORAL at 05:29

## 2018-03-05 RX ADMIN — SODIUM CHLORIDE 100 MILLILITER(S): 9 INJECTION INTRAMUSCULAR; INTRAVENOUS; SUBCUTANEOUS at 10:47

## 2018-03-05 RX ADMIN — PANTOPRAZOLE SODIUM 40 MILLIGRAM(S): 20 TABLET, DELAYED RELEASE ORAL at 05:30

## 2018-03-05 RX ADMIN — Medication 650 MILLIGRAM(S): at 11:40

## 2018-03-05 RX ADMIN — SODIUM CHLORIDE 100 MILLILITER(S): 9 INJECTION INTRAMUSCULAR; INTRAVENOUS; SUBCUTANEOUS at 17:29

## 2018-03-05 RX ADMIN — Medication 650 MILLIGRAM(S): at 10:46

## 2018-03-05 RX ADMIN — GABAPENTIN 300 MILLIGRAM(S): 400 CAPSULE ORAL at 14:17

## 2018-03-05 RX ADMIN — OXYCODONE HYDROCHLORIDE 5 MILLIGRAM(S): 5 TABLET ORAL at 22:50

## 2018-03-05 RX ADMIN — OXYCODONE HYDROCHLORIDE 5 MILLIGRAM(S): 5 TABLET ORAL at 22:20

## 2018-03-05 RX ADMIN — GABAPENTIN 300 MILLIGRAM(S): 400 CAPSULE ORAL at 21:44

## 2018-03-05 RX ADMIN — Medication 650 MILLIGRAM(S): at 19:30

## 2018-03-05 RX ADMIN — DIVALPROEX SODIUM 250 MILLIGRAM(S): 500 TABLET, DELAYED RELEASE ORAL at 17:45

## 2018-03-05 RX ADMIN — SODIUM CHLORIDE 100 MILLILITER(S): 9 INJECTION INTRAMUSCULAR; INTRAVENOUS; SUBCUTANEOUS at 05:31

## 2018-03-05 RX ADMIN — Medication 650 MILLIGRAM(S): at 18:36

## 2018-03-05 NOTE — DISCHARGE NOTE ADULT - MEDICATION SUMMARY - MEDICATIONS TO STOP TAKING
I will STOP taking the medications listed below when I get home from the hospital:    raNITIdine 150 mg oral tablet  -- 150  by mouth once a day (at bedtime)

## 2018-03-05 NOTE — DISCHARGE NOTE ADULT - CARE PROVIDER_API CALL
Chelsey Nathan), Neurology; Vascular Neurology  [address] [place] [address]  Phone: [phone]  Fax: [phone]    Naresh Eaton), Cardiovascular Disease; Internal Medicine; Interventional Cardiology  [address] [address]  Phone: [phone]  Fax: [phone]    Joao Farley), [facility] Medicine; Internal Medicine  [address] [place] [address]  Phone: [phone]  Fax: [phone]

## 2018-03-05 NOTE — DISCHARGE NOTE ADULT - CARE PLAN
Principal Discharge DX:	Headache  Secondary Diagnosis:	HTN (hypertension)  Secondary Diagnosis:	Chest pain Principal Discharge DX:	Headache  Goal:	Resolution of headache  Assessment and plan of treatment:	Please follow up with your primary doctor prior to returning to work. Information provided below for Dr. Nathan, the neurologist who saw you at [facility], please call for a follow up appointment.  Secondary Diagnosis:	HTN (hypertension)  Assessment and plan of treatment:	Continue medications as currently prescribed. Follow up with your PMD for further management of disease. Dash diet.  Secondary Diagnosis:	Chest pain Principal Discharge DX:	Headache  Goal:	Resolution of headache  Assessment and plan of treatment:	Please follow up with your primary doctor prior to returning to work. Information provided below for Dr. Nathan, the neurologist who saw you at [facility], please call for a follow up appointment.  Secondary Diagnosis:	HTN (hypertension)  Assessment and plan of treatment:	Continue medications as currently prescribed. Follow up with your PMD for further management of disease. Dash diet.  Secondary Diagnosis:	Chest pain  Assessment and plan of treatment:	You had an echocardiogram  and stress test performed that were normal. Please follow up with your primary doctor.

## 2018-03-05 NOTE — DISCHARGE NOTE ADULT - PATIENT PORTAL LINK FT
You can access the [facility] Portal, offered by [facility], by registering with the following website: [email or web address]

## 2018-03-05 NOTE — DISCHARGE NOTE ADULT - PLAN OF CARE
Resolution of headache Please follow up with your primary doctor prior to returning to work. Information provided below for Dr. Nathan, the neurologist who saw you at [facility], please call for a follow up appointment. Continue medications as currently prescribed. Follow up with your PMD for further management of disease. Dash diet. You had an echocardiogram  and stress test performed that were normal. Please follow up with your primary doctor.

## 2018-03-05 NOTE — CHART NOTE - NSCHARTNOTEFT_GEN_A_CORE
ADS NIGHT COVERAGE:    Notified by RN that pt c/o chest tightness. Pt seen at bedside. Pt denies any actual CP or SOB. Pointing under left breast when asked where discomfort is.     Gen: Pt sitting up in bed, no acute distress  Card: Bradycardic, regular rhythm  Lungs: CTA B/L    Vitals stable, HR 45 (has been in this range)    EKG done - Sinus Aram, no significant change from previous EKG    Will continue to monitor    Debby ADS PA-C  [id number]

## 2018-03-05 NOTE — DISCHARGE NOTE ADULT - MEDICATION SUMMARY - MEDICATIONS TO CHANGE
I will SWITCH the dose or number of times a day I take the medications listed below when I get home from the hospital:    gabapentin 100 mg oral capsule  -- 100  by mouth 3 times a day

## 2018-03-05 NOTE — PROGRESS NOTE ADULT - SUBJECTIVE AND OBJECTIVE BOX
Patient is a 52y old  Female who presents with a chief complaint of c/o headache, Lt side weakness, burning sensation ,numbness (05 Mar 2018 18:33)  seen this morning, HA better, now has CP      INTERVAL HPI/OVERNIGHT EVENTS:  T(C): 36.9 (03-05-18 @ 13:35), Max: 36.9 (03-05-18 @ 13:35)  HR: 58 (03-05-18 @ 13:35) (50 - 58)  BP: 107/67 (03-05-18 @ 13:35) (107/67 - 129/74)  RR: 16 (03-05-18 @ 13:35) (16 - 17)  SpO2: 100% (03-05-18 @ 13:35) (99% - 100%)  Wt(kg): --  I&O's Summary      LABS:                        11.9   8.63  )-----------( 203      ( 05 Mar 2018 05:05 )             36.5     03-05    145  |  108<H>  |  9   ----------------------------<  88  3.7   |  22  |  0.65    Ca    8.5      05 Mar 2018 05:05          CAPILLARY BLOOD GLUCOSE                MEDICATIONS  (STANDING):  amLODIPine   Tablet 5 milliGRAM(s) Oral daily  diVALproex  milliGRAM(s) Oral two times a day  gabapentin 300 milliGRAM(s) Oral three times a day  pantoprazole    Tablet 40 milliGRAM(s) Oral before breakfast  sodium chloride 0.9%. 1000 milliLiter(s) (100 mL/Hr) IV Continuous <Continuous>    MEDICATIONS  (PRN):  acetaminophen   Tablet. 650 milliGRAM(s) Oral every 6 hours PRN Mild, Moderate, and Severe Pain  metoclopramide Injectable 10 milliGRAM(s) IV Push every 8 hours PRN Headache          PHYSICAL EXAM:  GENERAL: NAD, well-groomed, well-developed  HEAD:  Atraumatic, Normocephalic  CHEST/LUNG: Clear to percussion bilaterally; No rales, rhonchi, wheezing, or rubs  HEART: Regular rate and rhythm; No murmurs, rubs, or gallops  ABDOMEN: Soft, Nontender, Nondistended; Bowel sounds present  EXTREMITIES:  2+ Peripheral Pulses, No clubbing, cyanosis, or edema  LYMPH: No lymphadenopathy noted  SKIN: No rashes or lesions    Care Discussed with Consultants/Other Providers [ +] YES  [ ] NO

## 2018-03-05 NOTE — PROGRESS NOTE ADULT - SUBJECTIVE AND OBJECTIVE BOX
Subjective: Pt. complains of occasional chest pain which she describes as sharp   	  MEDICATIONS:  MEDICATIONS  (STANDING):  amLODIPine   Tablet 5 milliGRAM(s) Oral daily  diVALproex  milliGRAM(s) Oral two times a day  gabapentin 300 milliGRAM(s) Oral three times a day  pantoprazole    Tablet 40 milliGRAM(s) Oral before breakfast  sodium chloride 0.9%. 1000 milliLiter(s) (100 mL/Hr) IV Continuous <Continuous>      LABS:	 	    CARDIAC MARKERS:                                11.9   8.63  )-----------( 203      ( 05 Mar 2018 05:05 )             36.5     03-05    145  |  108<H>  |  9   ----------------------------<  88  3.7   |  22  |  0.65    Ca    8.5      05 Mar 2018 05:05      proBNP:   Lipid Profile:   HgA1c:   TSH:       PHYSICAL EXAM:  T(C): 36.9 (03-05-18 @ 13:35), Max: 36.9 (03-05-18 @ 13:35)  HR: 58 (03-05-18 @ 13:35) (50 - 58)  BP: 107/67 (03-05-18 @ 13:35) (107/67 - 129/74)  RR: 16 (03-05-18 @ 13:35) (16 - 17)  SpO2: 100% (03-05-18 @ 13:35) (99% - 100%)  Wt(kg): --  I&O's Summary        Appearance: Normal	  HEENT:   Normal oral mucosa, PERRL, EOMI	  Lymphatic: No lymphadenopathy , no edema  Cardiovascular: Normal S1 S2, No JVD, No murmurs , Peripheral pulses palpable 2+ bilaterally  Respiratory: Lungs clear to auscultation, normal effort 	  Gastrointestinal:  Soft, Non-tender, + BS	  Skin: No rashes, No ecchymoses, No cyanosis, warm to touch  Musculoskeletal: Normal range of motion, normal strength  Psychiatry:  Mood & affect appropriate    TELEMETRY: 	    ECG: SB	  RADIOLOGY:   DIAGNOSTIC TESTING:  [ ] Echocardiogram:   [ ]  Catheterization:  [ ] Stress Test:    OTHER: 	      ASSESSMENT/PLAN: 	52y Female with history of HTN, GERD, ICH who is being seen for chest pain.   -ECG with no st-t changes suggestive of ischemia  -would transfer the patient to tele  -leonie with 3 sets CE  -check TTE  -further workup pending above    Naresh Eaton MD  [facility]  [address] [place] [address], [id number]  [place] [address]  office: [phone]  pager: [phone]

## 2018-03-05 NOTE — DISCHARGE NOTE ADULT - MEDICATION SUMMARY - MEDICATIONS TO TAKE
I will START or STAY ON the medications listed below when I get home from the hospital:    gabapentin 300 mg oral capsule  -- 1 cap(s) by mouth 3 times a day  -- Indication: For Pain control    divalproex sodium 250 mg oral delayed release tablet  -- 1 tab(s) by mouth 2 times a day  -- Indication: For pain control    amLODIPine 5 mg oral tablet  -- 1 tab(s) by mouth once a day  -- Indication: For HTN (hypertension)    omeprazole 20 mg oral delayed release capsule  -- 1 cap(s) by mouth once a day  -- Indication: For GERD

## 2018-03-05 NOTE — DISCHARGE NOTE ADULT - HOSPITAL COURSE
53 y/o female with pmhx of ICH (residual left sided weakness/numbness and hyperacusis, baseline difficulty findings words), HTN, GERD, neuropathy presents to ED for gradual onset right sided "pressure-like" nonexertional headache with associated with nausea, photophobia and worsening left hyperacusis. Neurology consulted,  Pt s/p LP in the ED. Pt treated with Tylenol, trial of IV  caffeine, IV Valproate, Reglan. No improvement of headache. Headache likely 2/2 recent LP. Anesthesia  called for blood patch however, pt was hesitant due to fear of worsening sciatica. 3/5-pt with c/o chest tightness, EKG- Unremarkable, Sinus Bradycardia. Cardiology following, recommend tele monitoring, check CPK, troponin. Depakote 250 bid po to started for headache management. 53 y/o female with pmhx of ICH (residual left sided weakness/numbness and hyperacusis, baseline difficulty findings words), HTN, GERD, neuropathy presents to ED for gradual onset right sided "pressure-like" nonexertional headache with associated with nausea, photophobia and worsening left hyperacusis. Neurology consulted,  Pt s/p LP in the ED. Pt treated with Tylenol, trial of IV  caffeine, IV Valproate, Reglan. No improvement of headache. Headache likely 2/2 recent LP. Anesthesia  called for blood patch however, pt was hesitant due to fear of worsening sciatica. 3/5-pt with c/o chest tightness, EKG- Unremarkable, Sinus Bradycardia. Cardiology following, recommend tele monitoring, check CPK, troponin. Depakote 250 bid po to started for headache management. 1 st Troponin-neg. 53 y/o female with pmhx of ICH (residual left sided weakness/numbness and hyperacusis, baseline difficulty findings words), HTN, GERD, neuropathy presents to ED for gradual onset right sided "pressure-like" nonexertional headache with associated with nausea, photophobia and worsening left hyperacusis. Neurology consulted,  Pt s/p LP in the ED. Pt treated with Tylenol, trial of IV  caffeine, IV Valproate, Reglan. No improvement of headache. Headache likely 2/2 recent LP. Anesthesia  called for blood patch however, pt was hesitant due to fear of worsening sciatica. 3/5-pt with c/o chest tightness, EKG- Unremarkable, Sinus Bradycardia. Cardiology following, recommend tele monitoring, check CPK, troponin. Depakote 250 bid po to started for headache management. 1 st Troponin-neg. Serial cardiac enzyme were negative. Echocardiogram and stress test were negative. Cleared for discharge.

## 2018-03-05 NOTE — DISCHARGE NOTE ADULT - VISION (WITH CORRECTIVE LENSES IF THE PATIENT USUALLY WEARS THEM):
Partially impaired: cannot see medication labels or newsprint, but can see obstacles in path, and the surrounding layout; can count fingers at arm's length/New onset L ear difficulty

## 2018-03-05 NOTE — PROGRESS NOTE ADULT - SUBJECTIVE AND OBJECTIVE BOX
[facility]        - Patient seen and examined.  - Today, patient is without complaints.  headache with mild improvement.   Pt reports that she is able to go the bathroom independently          *****MEDICATIONS: Current medication reviewed and documented.    MEDICATIONS  (STANDING):  amLODIPine   Tablet 5 milliGRAM(s) Oral daily  caffeine/sodium benzoate IVPB 500 milliGRAM(s) IV Intermittent <User Schedule>  diVALproex  milliGRAM(s) Oral two times a day  gabapentin 300 milliGRAM(s) Oral three times a day  pantoprazole    Tablet 40 milliGRAM(s) Oral before breakfast  sodium chloride 0.9%. 1000 milliLiter(s) (100 mL/Hr) IV Continuous <Continuous>    MEDICATIONS  (PRN):  acetaminophen   Tablet. 650 milliGRAM(s) Oral every 6 hours PRN Mild, Moderate, and Severe Pain  metoclopramide Injectable 10 milliGRAM(s) IV Push every 8 hours PRN Headache           ***** REVIEW OF SYSTEM:  GEN: no fever, no chills, no pain  RESP: no SOB, no cough, no sputum  CVS: no chest pain, no palpitations, no edema  GI: no abdominal pain, no nausea, no vomiting, no constipation, no diarrhea  : no dysurea, no frequency  NEURO: no headache, no diziness  PSYCH: no depression, not anxious  Derm : no itching, no rash         ***** VITAL SIGNS:  T(F): 98.5 (18 @ 13:35), Max: 98.5 (18 @ 13:35)  HR: 58 (18 @ 13:35) (50 - 58)  BP: 107/67 (18 @ 13:35) (107/67 - 129/74)  RR: 16 (18 @ 13:35) (16 - 17)  SpO2: 100% (18 @ 13:35) (99% - 100%)  Wt(kg): --  ,   I&O's Summary           *****PHYSICAL EXAM:  pt is extremely anxious    alert oriented x 3 attention comprehension are fair.  Able to name, repeat.   EOmi fundi not visualized   no nystagmus VFF to confrontation  Tongue is midline  Palate elevates symmetrically   Moving all 4 ext spontaneously no drift appreciated    Gait not assessed.            *****LAB AND IMAGIN.9   8.63  )-----------( 203      ( 05 Mar 2018 05:05 )             36.5               03-05    145  |  108<H>  |  9   ----------------------------<  88  3.7   |  22  |  0.65    Ca    8.5      05 Mar 2018 05:05                           [All pertinent recent Imaging/Reports reviewed]           *****A S S E S S M E N T   A N D   P L A N :      51 y/o female with pmhx of ICH (residual left sided weakness/numbness and hyperacusis, baseline difficulty findings words), HTN, GERD, neuropathy presents to ED for gradual onset right sided "pressure-like" nonexertional headache since 8:30pm last night. Now migrating to left side. Intermittent, found relief with Tylenol last night, now constant for last hour. Associated with nausea, photophobia and worsening left hyperacusis, 8/10. States she felt tired and had difficulty concentrating at work today. Sent in by her neurologist Dr. Castellon. Did not take any pain meds today. Pt states takes Gabapentin for left sided UE/LE numbness and feels worse x 2 days. Pt also admitted to left sided chest "pressure" x few hours. Associated with "feeling harder to breathe" although denies SOB. States different than baseline GERD. 8/10. Nonexertional, nonpleuritic,   nonradiating. Fam hx of MI mother at age 80. Never had stress test, had normal echo in past. No recent travel, surgeries, ocp use, IUDs, immobilization/recent hospitalization. No fever, chills, palpitations, cough, abd pain, vomiting, new weakness or numbness   called for second opinion,   She describes headache started on , and a progression of her burning sensation of her leg was noted. She describes a progression into her arms. She has never had seizures. She reports all her baseline symptoms of hyperacusis, burning sensation and difficulty understanding spoken word, seemed to have worsened with the headache She had a spinal tap  Lp unremarkable. no evidence of xanthochromia.  MRI reviewed with Dr. Baeza neuroradiology, who felt that there is no acute process going on mri brain.       EEG no sz.   fluid hydration.  I went over options with her  to treat the post lp heaaches,  with caffeine, depakote, reglan and benadryl vs. trying blood patch.  She was somewhat reluctant to try blood patch. She wanted to try noninvaisve methods first over the weekend,  before proceeding to blood patch.  called anesthesia for blood patch bc of no resolution of headache, however, pt was hesitant due to fear of worsening sciatica.  Continue meds as prescribed for headache.  will continue to monitor.    unable to use caffeine as pt may be going for stress testing.   Depakote 250 bid po to start, adverse effects discussed.       Thank you for allowing me to participate in the care of this patient. Please do not hesitate to call me if you have any  questions.        ________________  Chelsey Nathan MD  [facility]rological [facility] ([facility]  [phone]     30 minutes spent on total encounter; more than 50 % of the visit was  spent counseling and or  coordinating care by the attending physician.   At the present time, [facility] does not  provide outpatient followup, best to call the your insurance to find a participating provider.  This was explained to you at the time of the visit. Alternatively, if your insurance allows it, you can follow up with a neurologist  Dr. Joe Mario [phone].

## 2018-03-06 LAB
BUN SERPL-MCNC: 7 MG/DL — SIGNIFICANT CHANGE UP (ref 7–23)
CALCIUM SERPL-MCNC: 8.7 MG/DL — SIGNIFICANT CHANGE UP (ref 8.4–10.5)
CHLORIDE SERPL-SCNC: 104 MMOL/L — SIGNIFICANT CHANGE UP (ref 98–107)
CK SERPL-CCNC: 40 U/L — SIGNIFICANT CHANGE UP (ref 25–170)
CK SERPL-CCNC: 73 U/L — SIGNIFICANT CHANGE UP (ref 25–170)
CO2 SERPL-SCNC: 28 MMOL/L — SIGNIFICANT CHANGE UP (ref 22–31)
CREAT SERPL-MCNC: 0.63 MG/DL — SIGNIFICANT CHANGE UP (ref 0.5–1.3)
GLUCOSE SERPL-MCNC: 95 MG/DL — SIGNIFICANT CHANGE UP (ref 70–99)
HCT VFR BLD CALC: 39 % — SIGNIFICANT CHANGE UP (ref 34.5–45)
HGB BLD-MCNC: 13 G/DL — SIGNIFICANT CHANGE UP (ref 11.5–15.5)
MCHC RBC-ENTMCNC: 28.9 PG — SIGNIFICANT CHANGE UP (ref 27–34)
MCHC RBC-ENTMCNC: 33.3 % — SIGNIFICANT CHANGE UP (ref 32–36)
MCV RBC AUTO: 86.7 FL — SIGNIFICANT CHANGE UP (ref 80–100)
NRBC # FLD: 0 — SIGNIFICANT CHANGE UP
PLATELET # BLD AUTO: 209 K/UL — SIGNIFICANT CHANGE UP (ref 150–400)
PMV BLD: 10.6 FL — SIGNIFICANT CHANGE UP (ref 7–13)
POTASSIUM SERPL-MCNC: 3.5 MMOL/L — SIGNIFICANT CHANGE UP (ref 3.5–5.3)
POTASSIUM SERPL-SCNC: 3.5 MMOL/L — SIGNIFICANT CHANGE UP (ref 3.5–5.3)
RBC # BLD: 4.5 M/UL — SIGNIFICANT CHANGE UP (ref 3.8–5.2)
RBC # FLD: 13.5 % — SIGNIFICANT CHANGE UP (ref 10.3–14.5)
SODIUM SERPL-SCNC: 144 MMOL/L — SIGNIFICANT CHANGE UP (ref 135–145)
T3 SERPL-MCNC: 78.2 NG/DL — LOW (ref 80–200)
T4 AB SER-ACNC: 6.49 UG/DL — SIGNIFICANT CHANGE UP (ref 5.1–13)
TROPONIN T SERPL-MCNC: < 0.06 NG/ML — SIGNIFICANT CHANGE UP (ref 0–0.06)
TROPONIN T SERPL-MCNC: < 0.06 NG/ML — SIGNIFICANT CHANGE UP (ref 0–0.06)
TSH SERPL-MCNC: 5.49 UIU/ML — HIGH (ref 0.27–4.2)
WBC # BLD: 8.12 K/UL — SIGNIFICANT CHANGE UP (ref 3.8–10.5)
WBC # FLD AUTO: 8.12 K/UL — SIGNIFICANT CHANGE UP (ref 3.8–10.5)

## 2018-03-06 RX ADMIN — GABAPENTIN 300 MILLIGRAM(S): 400 CAPSULE ORAL at 21:33

## 2018-03-06 RX ADMIN — DIVALPROEX SODIUM 250 MILLIGRAM(S): 500 TABLET, DELAYED RELEASE ORAL at 17:30

## 2018-03-06 RX ADMIN — GABAPENTIN 300 MILLIGRAM(S): 400 CAPSULE ORAL at 14:20

## 2018-03-06 RX ADMIN — Medication 650 MILLIGRAM(S): at 21:33

## 2018-03-06 RX ADMIN — PANTOPRAZOLE SODIUM 40 MILLIGRAM(S): 20 TABLET, DELAYED RELEASE ORAL at 05:32

## 2018-03-06 RX ADMIN — GABAPENTIN 300 MILLIGRAM(S): 400 CAPSULE ORAL at 05:32

## 2018-03-06 RX ADMIN — Medication 650 MILLIGRAM(S): at 22:33

## 2018-03-06 RX ADMIN — AMLODIPINE BESYLATE 5 MILLIGRAM(S): 2.5 TABLET ORAL at 05:32

## 2018-03-06 RX ADMIN — DIVALPROEX SODIUM 250 MILLIGRAM(S): 500 TABLET, DELAYED RELEASE ORAL at 05:32

## 2018-03-06 NOTE — PROGRESS NOTE ADULT - SUBJECTIVE AND OBJECTIVE BOX
Patient is a 52y old  Female who presents with a chief complaint of c/o headache, Lt side weakness, burning sensation ,numbness (05 Mar 2018 18:33)  NAD      INTERVAL HPI/OVERNIGHT EVENTS:  T(C): 36.7 (03-06-18 @ 17:37), Max: 36.7 (03-06-18 @ 01:44)  HR: 61 (03-06-18 @ 17:37) (55 - 77)  BP: 131/84 (03-06-18 @ 17:37) (120/73 - 145/99)  RR: 18 (03-06-18 @ 17:37) (16 - 18)  SpO2: 98% (03-06-18 @ 17:37) (96% - 99%)  Wt(kg): --  I&O's Summary      LABS:                        13.0   8.12  )-----------( 209      ( 06 Mar 2018 06:08 )             39.0     03-06    144  |  104  |  7   ----------------------------<  95  3.5   |  28  |  0.63    Ca    8.7      06 Mar 2018 06:08          CAPILLARY BLOOD GLUCOSE                MEDICATIONS  (STANDING):  amLODIPine   Tablet 5 milliGRAM(s) Oral daily  diVALproex  milliGRAM(s) Oral two times a day  gabapentin 300 milliGRAM(s) Oral three times a day  pantoprazole    Tablet 40 milliGRAM(s) Oral before breakfast    MEDICATIONS  (PRN):  acetaminophen   Tablet. 650 milliGRAM(s) Oral every 6 hours PRN Mild, Moderate, and Severe Pain  metoclopramide Injectable 10 milliGRAM(s) IV Push every 8 hours PRN Headache          PHYSICAL EXAM:  GENERAL: NAD, well-groomed, well-developed  HEAD:  Atraumatic, Normocephalic  CHEST/LUNG: Clear to percussion bilaterally; No rales, rhonchi, wheezing, or rubs  HEART: Regular rate and rhythm; No murmurs, rubs, or gallops  ABDOMEN: Soft, Nontender, Nondistended; Bowel sounds present  EXTREMITIES:  2+ Peripheral Pulses, No clubbing, cyanosis, or edema  LYMPH: No lymphadenopathy noted  SKIN: No rashes or lesions    Care Discussed with Consultants/Other Providers [ ] YES  [ ] NO

## 2018-03-06 NOTE — PROGRESS NOTE ADULT - SUBJECTIVE AND OBJECTIVE BOX
[facility]rological Care [facility]        - Patient seen and examined.  - Today, patient is without complaints. pt is extremely anxious, she reports chest tightness.  She does complain of 6/10 headache.          *****MEDICATIONS: Current medication reviewed and documented.    MEDICATIONS  (STANDING):  amLODIPine   Tablet 5 milliGRAM(s) Oral daily  diVALproex  milliGRAM(s) Oral two times a day  gabapentin 300 milliGRAM(s) Oral three times a day  pantoprazole    Tablet 40 milliGRAM(s) Oral before breakfast    MEDICATIONS  (PRN):  acetaminophen   Tablet. 650 milliGRAM(s) Oral every 6 hours PRN Mild, Moderate, and Severe Pain  metoclopramide Injectable 10 milliGRAM(s) IV Push every 8 hours PRN Headache           ***** REVIEW OF SYSTEM:  GEN: no fever, no chills, no pain  RESP: no SOB, no cough, no sputum  CVS: no chest pain, no palpitations, no edema  GI: no abdominal pain, no nausea, no vomiting, no constipation, no diarrhea  : no dysurea, no frequency  NEURO: no headache, no diziness  PSYCH: no depression, not anxious  Derm : no itching, no rash         ***** VITAL SIGNS:  T(F): 98 (18 @ 11:00), Max: 98.1 (18 @ 01:44)  HR: 58 (18 @ 11:00) (55 - 77)  BP: 120/73 (18 @ 11:00) (120/73 - 145/99)  RR: 18 (18 @ 11:00) (16 - 18)  SpO2: 99% (18 @ 11:00) (96% - 99%)  Wt(kg): --  ,   I&O's Summary           *****PHYSICAL EXAM:   alert oriented x 3 attention comprehension are fair.  Able to name, repeat.   EOmi fundi not visualized   no nystagmus VFF to confrontation  Tongue is midline  Palate elevates symmetrically   Moving all 4 ext spontaneously no drift appreciated    Gait not assessed.            *****LAB AND IMAGIN.0   8.12  )-----------( 209      ( 06 Mar 2018 06:08 )             39.0               03-06    144  |  104  |  7   ----------------------------<  95  3.5   |  28  |  0.63    Ca    8.7      06 Mar 2018 06:08             CARDIAC MARKERS ( 06 Mar 2018 06:08 )  x     / < 0.06 ng/mL / 40 u/L / x     / x      CARDIAC MARKERS ( 06 Mar 2018 00:40 )  x     / < 0.06 ng/mL / 73 u/L / x     / x      CARDIAC MARKERS ( 05 Mar 2018 18:10 )  x     / < 0.06 ng/mL / 54 u/L / 1.34 ng/mL / x                    [All pertinent recent Imaging/Reports reviewed]           *****A S S E S S M E N T   A N D   P L A N :        51 y/o female with pmhx of ICH (residual left sided weakness/numbness and hyperacusis, baseline difficulty findings words), HTN, GERD, neuropathy presents to ED for gradual onset right sided "pressure-like" nonexertional headache since 8:30pm last night. Now migrating to left side. Intermittent, found relief with Tylenol last night, now constant for last hour. Associated with nausea, photophobia and worsening left hyperacusis, 8/10. States she felt tired and had difficulty concentrating at work today. Sent in by her neurologist Dr. Castellon. Did not take any pain meds today. Pt states takes Gabapentin for left sided UE/LE numbness and feels worse x 2 days. Pt also admitted to left sided chest "pressure" x few hours. Associated with "feeling harder to breathe" although denies SOB. States different than baseline GERD. 8/10. Nonexertional, nonpleuritic,   nonradiating. Fam hx of MI mother at age 80. Never had stress test, had normal echo in past. No recent travel, surgeries, ocp use, IUDs, immobilization/recent hospitalization. No fever, chills, palpitations, cough, abd pain, vomiting, new weakness or numbness   called for second opinion,   She describes headache started on , and a progression of her burning sensation of her leg was noted. She describes a progression into her arms. She has never had seizures. She reports all her baseline symptoms of hyperacusis, burning sensation and difficulty understanding spoken word, seemed to have worsened with the headache She had a spinal tap  Lp unremarkable. no evidence of xanthochromia.     MRI reviewed with Dr. Baeza neuroradiology, who felt that there is no acute process going on mri brain.       EEG no sz.   fluid hydration.  I went over options with her  to treat the post lp headaches,  with caffeine( on hold due to stress test), depakote, reglan and benadryl vs. trying blood patch.  She was somewhat reluctant to try blood patch. She wanted to try noninvaisve methods first over the weekend,  before proceeding to blood patch.     headache seems to be improving.   Depakote 250 bid po to start, adverse effects discussed.   will order mri with contrast if there is no improvement   Thank you for allowing me to participate in the care of this patient. Please do not hesitate to call me if you have any  questions.        ________________  Chelsey Nathan MD  [facility]rological [facility] ([facility]  [phone]     30 minutes spent on total encounter; more than 50 % of the visit was  spent counseling and or  coordinating care by the attending physician.   At the present time, [facility] does not  provide outpatient followup, best to call the your insurance to find a participating provider.  This was explained to you at the time of the visit. Alternatively, if your insurance allows it, you can follow up with a neurologist  Dr. Joe Mario [phone].

## 2018-03-06 NOTE — CONSULT NOTE ADULT - ATTENDING COMMENTS
Patient seen and examined, agree with above assessment and plan as transcribed above.    - Asymptomatic sinus bradycardia  - check full TFT's, TSH appears elevated    Jordan Corona MD, [facility]  [facility], [facility]  [address] [place] [address]  PHONE:  [phone]  BEEPER : [phone]
patient seen/examined  no need for urgent cath  await stress test  cath if tst positive  will need neuro clearance prior to cath
Patient seen and examined  Agree with above    No role for neurosurgical intervention

## 2018-03-06 NOTE — CONSULT NOTE ADULT - SUBJECTIVE AND OBJECTIVE BOX
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 Has also felt worsened left arm and leg burning pain. She is presently on gabapentin 100 mg TID. She has also felt slight increase in her baseline left sided weakness during the same length of time. Denies vomiting, but did feel some nausea. Denies vision changes. Complains also of left shoulder pain radiating to her back.     NIHSS 1 MRS 1    Review of Systems:  CONSTITUTIONAL:  No weight loss, fever, chills, weakness or fatigue.  HEENT:  Eyes:  No visual loss, blurred vision, double vision or yellow sclerae. Ears, Nose, Throat:  No hearing loss, sneezing, congestion, runny nose or sore throat.  SKIN:  No rash or itching.  CARDIOVASCULAR:  No chest pain, chest pressure or chest discomfort. No palpitations or edema.  RESPIRATORY:  No shortness of breath, cough or sputum.  GASTROINTESTINAL:  See HPI  GENITOURINARY: No Burning on urination.   NEUROLOGICAL:  see HPI  MUSCULOSKELETAL:  No muscle, back pain, joint pain or stiffness.  HEMATOLOGIC:  No anemia, bleeding or bruising.  LYMPHATICS:  No enlarged nodes. No history of splenectomy.  PSYCHIATRIC:  No history of depression or anxiety.  ENDOCRINOLOGIC:  No reports of sweating, cold or heat intolerance. No polyuria or polydipsia.  ALLERGIES:  No history of asthma, hives, eczema or rhinitis.    MEDICATIONS  (STANDING):  norvasc 5 mg daily  gabapentin 100 mg TID  Vitamin D3 1000 mg    Allergies  No Known Allergies    PAST MEDICAL & SURGICAL HISTORY:  Chronic back pain  Chronic neck pain  HTN (hypertension)  After cataract  No significant past surgical history    FH: NC    SH: Denies tobacco, EtOH, or drug use    Objective:   Vital Signs Last 24 Hrs  T(C): --  T(F): --  HR: 57 (27 Feb 2018 20:42) (57 - 57)  BP: 141/86 (27 Feb 2018 20:42) (141/86 - 141/86)  BP(mean): --  RR: 18 (27 Feb 2018 20:42) (18 - 18)  SpO2: 99% (27 Feb 2018 20:42) (99% - 99%)            General Adult Exam  GENERAL APPEARANCE: Appears to be in moderate discomfort but no acute distress    Neurological Exam:  Mental Status: AAOx3, speech fluent, follows commands    Cranial Nerves: PERRL, EOMI, VFF, CN V1-3 intact to light touch.  No facial asymmetry. Tongue, uvula and palate midline.  Sternocleidomastoid and Trapezius intact bilaterally.    Motor:   Tone: normal.                  Strength: LLE drift, no drift in other extremities     Tremor: No resting, postural or action tremor.  No myoclonus.    Sensation: intact to light touch x 4 extremities, however does feel burning pain when touched on the left UE and left LE    Coord: No ataxia/dysmetria on FTN b/l      Radiology    < from: CT Brain Stroke Protocol (02.27.18 @ 20:52) >  FINDINGS:   There is no acute intracranial hemorrhage, extra-axial fluid collection,   hydrocephalus, mass effect or midline shift. A tiny hyperattenuating   focus in the high right centrum semiovale (series 2 image 21) is   unchanged from prior head CT dated 12/25/2016 and is unlikely to   represent hemorrhage.    Ventricles and sulci are normal in size for the patient's age. Basal   cisterns are patent.    Paranasal sinuses and mastoid air cells are clear. Calvarium is intact.     IMPRESSION:   No acute intracranial hemorrhage, extra-axial fluid collection,   hydrocephalus, mass effector midline shift.    < end of copied text >
53 y/o female with pmhx of ICH (residual left sided weakness/numbness and hyperacusis, baseline difficulty findings words), HTN, GERD, neuropathy presents to ED for gradual onset right sided "pressure-like" nonexertional headache since 8:30pm last night. Now migrating to left side. Intermittent, found relief with Tylenol last night, now constant for last hour. Associated with nausea, photophobia and worsening left hyperacusis, 8/10. States she felt tired and had difficulty concentrating at work today. Sent in by her neurologist Dr. Castellon. Did not take any pain meds today. Pt states takes Gabapentin for left sided UE/LE numbness and feels worse x 2 days. Pt also admitted to left sided chest "pressure" x few hours. Associated with "feeling harder to breathe" although denies SOB. States different than baseline GERD.    WDWN female in NAD  Vital Signs Last 24 Hrs  T(C): 36.6 (28 Feb 2018 01:31), Max: 36.6 (27 Feb 2018 21:25)  T(F): 97.8 (28 Feb 2018 01:31), Max: 97.8 (27 Feb 2018 21:25)  HR: 48 (28 Feb 2018 01:31) (48 - 60)  BP: 110/75 (28 Feb 2018 01:31) (107/60 - 141/86)  BP(mean): --  RR: 15 (28 Feb 2018 01:31) (15 - 18)  SpO2: 99% (28 Feb 2018 01:31) (99% - 100%)    AAO X 3  PERRLA, EOMI  VILLAVICENCIO strength 5/5  No pronator drift  SILT with LUE/LLE hyperesthesia (Baseline)                          13.2   7.12  )-----------( 259      ( 27 Feb 2018 21:15 )             40.0   02-27    142  |  102  |  10  ----------------------------<  132<H>  4.2   |  28  |  0.79    Ca    9.0      27 Feb 2018 21:15    TPro  6.9  /  Alb  4.2  /  TBili  0.3  /  DBili  x   /  AST  16  /  ALT  13  /  AlkPhos  58  02-27    PT/INR - ( 27 Feb 2018 21:15 )   PT: 11.2 SEC;   INR: 0.97          PTT - ( 27 Feb 2018 21:15 )  PTT:31.9 SEC    < from: CT Brain Stroke Protocol (02.27.18 @ 20:52) >  Curvilinear areas of high density underlying the frontoparietal lobes   bilaterally. While these may be artifactual in nature, short-term   follow-up head CT is suggested to exclude subarachnoid hemorrhage.    < end of copied text >    < from: CT Head No Cont (02.28.18 @ 02:36) >  Persistent small curvilinear areas of high density in the right   frontoparietal lobes which are unchanged from the prior study dated   February 27, 2018. There is a suggestion of these foci on the December 25, 2016 study as well. Findings likely represent small foci of   mineralization, however, subarachnoid hemorrhage is not completely   excluded. MRI suggested for further evaluation.    < end of copied text >
HISTORY OF PRESENT ILLNESS:    53 y/o female with pmhx of ICH (residual left sided weakness/numbness and hyperacusis, baseline difficulty findings words), HTN, GERD, neuropathy presents to ED for headaches, fatigue, and we were asked to evaluate her chest pain.  She reports intermittent, non exertional chest pain.  No clear alleviating or exacerbating facotrs.      PAST MEDICAL & SURGICAL HISTORY:  ICH (intracerebral hemorrhage)  Chronic back pain  Chronic neck pain  HTN (hypertension)  After cataract      FAMILY HISTORY:  Family history of hypertension  Family history of Parkinson disease  Family history of cerebrovascular accident (CVA) (Aunt)      SOCIAL HISTORY:    ( x) Non-smoker ( ) Smoker ( ) Alcohol Abuse ( ) IVDA    Allergies  No Known Allergies    EDICATIONS  (STANDING):  amLODIPine   Tablet 5 milliGRAM(s) Oral daily  diVALproex  milliGRAM(s) Oral two times a day  gabapentin 300 milliGRAM(s) Oral three times a day  pantoprazole    Tablet 40 milliGRAM(s) Oral before breakfast    MEDICATIONS  (PRN):  acetaminophen   Tablet. 650 milliGRAM(s) Oral every 6 hours PRN Mild, Moderate, and Severe Pain  metoclopramide Injectable 10 milliGRAM(s) IV Push every 8 hours PRN Headache      REVIEW OF SYSTEMS:     CONSTITUTIONAL: No fever, chills, weight loss, +fatigue  RESPIRATORY: No cough, wheezing, chills or hemoptysis  CARDIOVASCULAR: See HPI.  Denies SOB, LE edema  GASTROINTESTINAL: No abdominal pain. No nausea, vomiting, diarrhea or constipation. No melena or hematochezia.  GENITOURINARY: No dysuria, frequency, hematuria, or incontinence  SKIN: No itching, burning, rashes, or lesions   	  [x ] All others negative	  [ ] Unable to obtain    PHYSICAL EXAM:  Vital Signs Last 24 Hrs  T(C): 36.7 (06 Mar 2018 11:00), Max: 36.7 (06 Mar 2018 01:44)  T(F): 98 (06 Mar 2018 11:00), Max: 98.1 (06 Mar 2018 01:44)  HR: 58 (06 Mar 2018 11:00) (55 - 77)  BP: 120/73 (06 Mar 2018 11:00) (120/73 - 145/99)  RR: 18 (06 Mar 2018 11:00) (16 - 18)  SpO2: 99% (06 Mar 2018 11:00) (96% - 99%)    Cardiovascular:  S1 S2 RRR, No JVD, No murmurs  Respiratory: Lungs CTA B/L, No wheeze, rales, or rhonchi	  Psychiatry: A & O x 3, Mood & affect appropriate  Gastrointestinal:  Soft, Non-tender, + BS	  Skin: No rashes, No ecchymoses	  Extremities: No clubbing, cyanosis or edema    DATA:    TELEMETRY: SR	      ECG:  	SB, no acute changes      RADIOLOGY:  < from: CT Head No Cont (02.28.18 @ 02:36) >    Persistent small curvilinear areas of high density in the right   frontoparietal lobes which are unchanged from the prior study dated   February 27, 2018. There is a suggestion of these foci on the December 25, 2016 study as well. Findings likely represent small foci of   mineralization, however, subarachnoid hemorrhage is not completely   excluded. MRI suggested for further evaluation.    < end of copied text >  		  LABS:	 	    CARDIAC MARKERS ( 06 Mar 2018 06:08 )  x     / < 0.06 ng/mL / 40 u/L / x     / x      CARDIAC MARKERS ( 06 Mar 2018 00:40 )  x     / < 0.06 ng/mL / 73 u/L / x     / x      CARDIAC MARKERS ( 05 Mar 2018 18:10 )  x     / < 0.06 ng/mL / 54 u/L / 1.34 ng/mL / x                     13.0   8.12  )-----------( 209      ( 06 Mar 2018 06:08 )             39.0     144  |  104  |  7   ----------------------------<  95  3.5   |  28  |  0.63    Ca    8.7      06 Mar 2018 06:08    TSH: Thyroid Stimulating Hormone, Serum: 5.49 uIU/mL (03-06 @ 06:08)    ASSESSMENT/PLAN: 	  53 y/o female with pmhx of ICH (residual left sided weakness/numbness and hyperacusis, baseline difficulty findings words), HTN, GERD, neuropathy presents to ED for headaches, fatigue, and we were asked to evaluate her chest pain.     --ACS ruled out with serial CE  --No need for urgent cardiac cath  --F/U TTE and NST    Delaney Saenz PA-C  [facility]  [address], [address]   [place] [address]  office [phone]  pager [phone]
HISTORY OF PRESENT ILLNESS:  51 y/o female with pmhx of ICH (residual left sided weakness/numbness and hyperacusis, baseline difficulty findings words), HTN, GERD, neuropathy   P/W   gradual onset right sided "pressure-like" nonexertional headache  migrating to left side Intermittent, found relief with Tylenol. Associated with nausea, photophobia and worsening left hyperacusis, 8/10.  Pt also admitted w/ left sided chest "pressure" x few hours. Associated with "feeling harder to breathe" although denies SOB. Nonexertional, nonpleuritic, non radiating. Fam hx of MI mother at age 80. Never had stress test, had normal echo in past.  called for Sinus Bradycardia     PAST MEDICAL & SURGICAL HISTORY:  ICH (intracerebral hemorrhage)  Chronic back pain  Chronic neck pain  HTN (hypertension)  After cataract    PREVIOUS DIAGNOSTIC TESTING:    [ ] Echocardiogram:   [ ]  Catheterization:  [ ] Stress Test:  	    MEDICATIONS:  amLODIPine   Tablet 5 milliGRAM(s) Oral daily        acetaminophen  IVPB. 1000 milliGRAM(s) IV Intermittent once  caffeine/sodium benzoate IVPB 500 milliGRAM(s) IV Intermittent <User Schedule>  gabapentin 300 milliGRAM(s) Oral three times a day  metoclopramide Injectable 10 milliGRAM(s) IV Push every 8 hours PRN    pantoprazole    Tablet 40 milliGRAM(s) Oral before breakfast      sodium chloride 0.9%. 1000 milliLiter(s) IV Continuous <Continuous>      Allergies    No Known Allergies    Intolerances        FAMILY HISTORY:  Family history of hypertension  Family history of Parkinson disease  Family history of cerebrovascular accident (CVA) (Aunt)      SOCIAL HISTORY:    [x ] Non-smoker  [ ] Former Smoker  [ ] Current Smoker  [ ] Alcohol      REVIEW OF SYSTEMS:  [ ]chest pain  [  ]shortness of breath  [  ]palpitations  [  ]syncope  [ ]near syncope [  ]diplopia  [  ]altered mental status   [  ]fevers  [ ]chills [ ]nausea  [ ] vomiting  [ ]abdominal pain  [ ]melena  [ ]BRBPR  [  ]epistaxis  [  ]rash  [  ]lower extremity edema          [x ] All others negative	  [ ] Unable to obtain    PHYSICAL EXAM:  T(C): 36.4 (03-04-18 @ 14:12), Max: 37.2 (03-03-18 @ 21:52)  HR: 58 (03-04-18 @ 14:12) (45 - 59)  BP: 125/77 (03-04-18 @ 14:12) (119/79 - 128/82)  RR: 18 (03-04-18 @ 14:12) (16 - 18)  SpO2: 99% (03-04-18 @ 14:12) (95% - 100%)  Wt(kg): --  I&O's Summary      Appearance: No Signs of acute distress  HEENT:   Normal oral mucosa, PERRL, EOMI	  Lymphatic: No lymphadenopathy  Cardiovascular: Normal S1 S2, No JVD, No murmurs,   Respiratory: Lungs clear to auscultation	  Gastrointestinal:  Soft, Non-tender, + BS		  Extremities:  No clubbing, cyanosis or edema  Vascular: Peripheral pulses palpable 2+ bilaterally    TELEMETRY: 	    ECG:  sinus bradycardia 	  RADIOLOGY:  CXR clear lungs    < from: CT Head No Cont (02.28.18 @ 02:36) >    IMPRESSION:     Persistent small curvilinear areas of high density in the right   frontoparietal lobes which are unchanged from the prior study dated   February 27, 2018. There is a suggestion of these foci on the December 25, 2016 study as well. Findings likely represent small foci of   mineralization, however, subarachnoid hemorrhage is not completely   excluded. MRI suggested for further evaluation.        The findings were discussed with Dr Hill on 2/28/2018 3:11 AM.  Hospital policies for call back including read back policies were   followed. The verbal communication call back supplements this written   report.     < end of copied text >  < from: MR Head No Cont (03.01.18 @ 06:46) >    IMPRESSION:    No signal abnormality seen in the region of the small linear hyperdensity   identified on the prior CT scan. This appears unchanged from an earlier   study from 2016 and may represent asmall area of mineralization.    Otherwise stable exam when compared with the prior MR.    < end of copied text >    EEG Classification / Summary:  Normal EEG in the awake  states  -  Clinical Impression:    There were no epileptiform abnormalities recorded.    OTHER: 	  	  LABS:	 	    CARDIAC MARKERS:                                  12.9   11.06 )-----------( 250      ( 04 Mar 2018 05:40 )             40.2     03-04    144  |  106  |  7   ----------------------------<  108<H>  3.5   |  22  |  0.70    Ca    8.6      04 Mar 2018 05:40      proBNP:   Lipid Profile:   HgA1c:   TSH:     ASSESSMENT/PLAN: 51 y/o female with pmhx of ICH (residual left sided weakness/numbness and hyperacusis, baseline difficulty findings words), HTN, GERD, neuropathy   P/W   gradual onset right sided "pressure-like" nonexertional headache  migrating to left side Intermittent, found relief with Tylenol. Associated with nausea, photophobia and worsening left hyperacusis, 8/10.  Pt also admitted w/ left sided chest "pressure" x few hours. Associated with "feeling harder to breathe" although denies SOB. Nonexertional, nonpleuritic, non radiating. Fam hx of MI mother at age 80. Never had stress test, had normal echo in past.  called for Sinus Bradycardia   Asymptomatic sinus Bradycardia   on admission CE neg x 2  pt denies any near syncope, chest pain , shortness of breath   Bp controlled with Norvasc   avoid any AV kenya blockers   check orthostatics   could check echo
[facility]        Patient is a 52y old  Female who presents with a chief complaint of c/o headache,Lt side weakness, burning sensation ,numbness (2018 22:44)      HPI:  53 y/o female with pmhx of ICH (residual left sided weakness/numbness and hyperacusis, baseline difficulty findings words), HTN, GERD, neuropathy presents to ED for gradual onset right sided "pressure-like" nonexertional headache since 8:30pm last night. Now migrating to left side. Intermittent, found relief with Tylenol last night, now constant for last hour. Associated with nausea, photophobia and worsening left hyperacusis, 8/10. States she felt tired and had difficulty concentrating at work today. Sent in by her neurologist Dr. Castellon. Did not take any pain meds today. Pt states takes Gabapentin for left sided UE/LE numbness and feels worse x 2 days. Pt also admitted to left sided chest "pressure" x few hours. Associated with "feeling harder to breathe" although denies SOB. States different than baseline GERD. 8/10. Nonexertional, nonpleuritic, nonradiating. Fam hx of MI mother at age 80. Never had stress test, had normal echo in past. No recent travel, surgeries, ocp use, IUDs, immobilization/recent hospitalization. No fever, chills, palpitations, cough, abd pain, vomiting, new weakness or numbness   called for second opinion,   She describes headache started on , and a progression of her burning sensation of her leg was noted. She describes a progression into her arms. She has never had seizures. She reports all her baseline symptoms seemed to have worsened.        *****PAST MEDICAL / Surgical  HISTORY:  PAST MEDICAL & SURGICAL HISTORY:  ICH (intracerebral hemorrhage)  Chronic back pain  Chronic neck pain  HTN (hypertension)  After cataract           *****FAMILY HISTORY:  FAMILY HISTORY:  Family history of hypertension  Family history of Parkinson disease  Family history of cerebrovascular accident (CVA) (Aunt)           *****SOCIAL HISTORY:  Alcohol: None  Smoking: None         *****ALLERGIES:   Allergies    No Known Allergies    Intolerances             *****MEDICATIONS: current medication reviewed and documented.   MEDICATIONS  (STANDING):  amLODIPine   Tablet 5 milliGRAM(s) Oral daily  atorvastatin 40 milliGRAM(s) Oral at bedtime  gabapentin 300 milliGRAM(s) Oral three times a day  pantoprazole    Tablet 40 milliGRAM(s) Oral before breakfast    MEDICATIONS  (PRN):  acetaminophen   Tablet. 650 milliGRAM(s) Oral every 6 hours PRN headache           *****REVIEW OF SYSTEM:  GEN: no fever, no chills, no pain  RESP: no SOB, no cough, no sputum  CVS: no chest pain, no palpitations, no edema  GI: no abdominal pain, no nausea, no vomiting, no constipation, no diarrhea  : no dysurea, no frequency, no hematurea  Neuro: no headache, no dizziness  PSYCH: no anxiety, no depression  Derm : no itching, no rash         *****VITAL SIGNS:  T(C): 36.4 (18 @ 07:05), Max: 36.8 (18 @ 21:07)  HR: 50 (18 @ 07:30) (43 - 57)  BP: 118/76 (18 @ 07:05) (118/76 - 158/95)  RR: 18 (18 @ 07:05) (18 - 18)  SpO2: 100% (18 @ 07:05) (98% - 100%)  Wt(kg): --           *****PHYSICAL EXAM:   Alert oriented x 3   Attention comprehension are fair. Able to name, repeat, read without any difficulty.   Able to follow 3 step commands.     EOMI fundi not visualized,  VFF to confrontration  No facial asymmetry   Tongue is midline   Palate elevates symmetrically   Moving all 4 ext symmetrically no pronator drift.  Reflexes are symmetric throughout   sensation is grossly symmetric  Gait : not assessed.  B/L down going toes        >>> <<<         *****LAB AND IMAGIN.3   5.40  )-----------( 246      ( 01 Mar 2018 07:05 )             41.0               -    141  |  104  |  13  ----------------------------<  131<H>  3.6   |  26  |  0.75    Ca    8.9      01 Mar 2018 07:05  Phos  3.6     03-  Mg     1.9     -    TPro  6.7  /  Alb  4.0  /  TBili  0.6  /  DBili  x   /  AST  15  /  ALT  14  /  AlkPhos  57  03-01    PT/INR - ( 2018 21:15 )   PT: 11.2 SEC;   INR: 0.97          PTT - ( 2018 21:15 )  PTT:31.9 SEC            CARDIAC MARKERS ( 2018 07:00 )  x     / < 0.06 ng/mL / 55 u/L / 1.06 ng/mL / x      CARDIAC MARKERS ( 2018 21:15 )  x     / < 0.06 ng/mL / 75 u/L / 1.12 ng/mL / x                  Urinalysis Basic - ( 2018 22:40 )    Color: COLORL / Appearance: CLEAR / S.007 / pH: 6.5  Gluc: NEGATIVE / Ketone: NEGATIVE  / Bili: NEGATIVE / Urobili: NORMAL mg/dL   Blood: NEGATIVE / Protein: NEGATIVE mg/dL / Nitrite: NEGATIVE   Leuk Esterase: NEGATIVE / RBC: x / WBC x   Sq Epi: OCC / Non Sq Epi: x / Bacteria: x        [All pertinent recent Imaging reports reviewed]         *****A S S E S S M E N T   A N D   P L A N :      53 y/o female with pmhx of ICH (residual left sided weakness/numbness and hyperacusis, baseline difficulty findings words), HTN, GERD, neuropathy presents to ED for gradual onset right sided "pressure-like" nonexertional headache since 8:30pm last night. Now migrating to left side. Intermittent, found relief with Tylenol last night, now constant for last hour. Associated with nausea, photophobia and worsening left hyperacusis, 8/10. States she felt tired and had difficulty concentrating at work today. Sent in by her neurologist Dr. Castellon. Did not take any pain meds today. Pt states takes Gabapentin for left sided UE/LE numbness and feels worse x 2 days. Pt also admitted to left sided chest "pressure" x few hours. Associated with "feeling harder to breathe" although denies SOB. States different than baseline GERD. 8/10. Nonexertional, nonpleuritic, nonradiating. Fam hx of MI mother at age 80. Never had stress test, had normal echo in past. No recent travel, surgeries, ocp use, IUDs, immobilization/recent hospitalization. No fever, chills, palpitations, cough, abd pain, vomiting, new weakness or numbness   called for second opinion,   She describes headache started on , and a progression of her burning sensation of her leg was noted. She describes a progression into her arms. She has never had seizures. She reports all her baseline symptoms of hyperacusis, burning sensation and difficulty understanding spoken word, seemed to have worsened with the headache She had a spinal tap  Lp unremarkable. no evidence of xanthochromia.    after spinal tap, she had a orthostatic headache, s/p blood patch with some improvement.  Pt reports poor po intake over the last 48 hrs.  She still reports that the burning sensation is worse.  Would get EEG to r/o sensory seizures.   fluid hydration.          60 minutes spent on the total encounter;  more than 50 % of the visit was spent on counseling  and or coordinating care by the attending physician.    Thank you for allowing me to participate in the care of this mj patient. Please do not hesitate to call me if you have any questions.   ___________________________  Will follow with you.  Thank you,  Chelsey Nathan MD  Diplomate of the American Board of Neurology and Psychiatry.  Diplomate of the American Board of Vascular Neurology.   [facility]   Ph: [phone]      This and subsequent notes were partially created using voice recognition software and will  inherently be subject to errors including those of syntax and sound alike substitutions which may escape proofreading. In such instances original meaning may be extrapolated by contextual derivation.

## 2018-03-06 NOTE — PROVIDER CONTACT NOTE (OTHER) - RECOMMENDATIONS
will monitor
EKG in progress.
[facility]
awaiting orders

## 2018-03-06 NOTE — PROGRESS NOTE ADULT - SUBJECTIVE AND OBJECTIVE BOX
Subjective: CP resolving     acetaminophen   Tablet. 650 milliGRAM(s) Oral every 6 hours PRN  amLODIPine   Tablet 5 milliGRAM(s) Oral daily  diVALproex  milliGRAM(s) Oral two times a day  gabapentin 300 milliGRAM(s) Oral three times a day  metoclopramide Injectable 10 milliGRAM(s) IV Push every 8 hours PRN  pantoprazole    Tablet 40 milliGRAM(s) Oral before breakfast                            13.0   8.12  )-----------( 209      ( 06 Mar 2018 06:08 )             39.0       03-06    144  |  104  |  7   ----------------------------<  95  3.5   |  28  |  0.63    Ca    8.7      06 Mar 2018 06:08        CARDIAC MARKERS ( 06 Mar 2018 06:08 )  x     / < 0.06 ng/mL / 40 u/L / x     / x      CARDIAC MARKERS ( 06 Mar 2018 00:40 )  x     / < 0.06 ng/mL / 73 u/L / x     / x      CARDIAC MARKERS ( 05 Mar 2018 18:10 )  x     / < 0.06 ng/mL / 54 u/L / 1.34 ng/mL / x            T(C): 36.7 (03-06-18 @ 11:00), Max: 36.7 (03-06-18 @ 01:44)  HR: 58 (03-06-18 @ 11:00) (55 - 77)  BP: 120/73 (03-06-18 @ 11:00) (120/73 - 145/99)  RR: 18 (03-06-18 @ 11:00) (16 - 18)  SpO2: 99% (03-06-18 @ 11:00) (96% - 99%)  Wt(kg): --    I&O's Summary          Appearance: Normal	  HEENT:   Normal oral mucosa, PERRL, EOMI	  Lymphatic: No lymphadenopathy , no edema  Cardiovascular: Normal S1 S2, No JVD, No murmurs , Peripheral pulses palpable 2+ bilaterally  Respiratory: Lungs clear to auscultation, normal effort 	  Gastrointestinal:  Soft, Non-tender, + BS	  Skin: No rashes, No ecchymoses, No cyanosis, warm to touch  Musculoskeletal: Normal range of motion, normal strength  Psychiatry:  Mood & affect appropriate    TELEMETRY: SR	    ECG: SB	  RADIOLOGY:   DIAGNOSTIC TESTING:  [ ] Echocardiogram:   [ ]  Catheterization:  [ ] Stress Test:    OTHER: 	      ASSESSMENT/PLAN: 	52y Female with history of HTN, GERD, ICH who is being seen for chest pain.   -ECG with no st-t changes suggestive of ischemia  -MI ruled out with negative CE  -check TTE  -check NST  -neuro follow up    Naresh Eaton MD  [facility]  [address] [place] [address], [id number]  [place] [address]  office: [phone]  pager: [phone]

## 2018-03-07 VITALS
TEMPERATURE: 98 F | DIASTOLIC BLOOD PRESSURE: 93 MMHG | OXYGEN SATURATION: 100 % | HEART RATE: 61 BPM | SYSTOLIC BLOOD PRESSURE: 127 MMHG | RESPIRATION RATE: 18 BRPM

## 2018-03-07 LAB
BUN SERPL-MCNC: 8 MG/DL — SIGNIFICANT CHANGE UP (ref 7–23)
CALCIUM SERPL-MCNC: 9.5 MG/DL — SIGNIFICANT CHANGE UP (ref 8.4–10.5)
CHLORIDE SERPL-SCNC: 100 MMOL/L — SIGNIFICANT CHANGE UP (ref 98–107)
CO2 SERPL-SCNC: 26 MMOL/L — SIGNIFICANT CHANGE UP (ref 22–31)
CREAT SERPL-MCNC: 0.68 MG/DL — SIGNIFICANT CHANGE UP (ref 0.5–1.3)
GLUCOSE SERPL-MCNC: 118 MG/DL — HIGH (ref 70–99)
HCT VFR BLD CALC: 40.3 % — SIGNIFICANT CHANGE UP (ref 34.5–45)
HGB BLD-MCNC: 13.6 G/DL — SIGNIFICANT CHANGE UP (ref 11.5–15.5)
MAGNESIUM SERPL-MCNC: 1.8 MG/DL — SIGNIFICANT CHANGE UP (ref 1.6–2.6)
MCHC RBC-ENTMCNC: 28.9 PG — SIGNIFICANT CHANGE UP (ref 27–34)
MCHC RBC-ENTMCNC: 33.7 % — SIGNIFICANT CHANGE UP (ref 32–36)
MCV RBC AUTO: 85.7 FL — SIGNIFICANT CHANGE UP (ref 80–100)
NRBC # FLD: 0 — SIGNIFICANT CHANGE UP
PLATELET # BLD AUTO: 225 K/UL — SIGNIFICANT CHANGE UP (ref 150–400)
PMV BLD: 11.1 FL — SIGNIFICANT CHANGE UP (ref 7–13)
POTASSIUM SERPL-MCNC: 3.5 MMOL/L — SIGNIFICANT CHANGE UP (ref 3.5–5.3)
POTASSIUM SERPL-SCNC: 3.5 MMOL/L — SIGNIFICANT CHANGE UP (ref 3.5–5.3)
RBC # BLD: 4.7 M/UL — SIGNIFICANT CHANGE UP (ref 3.8–5.2)
RBC # FLD: 13.4 % — SIGNIFICANT CHANGE UP (ref 10.3–14.5)
SODIUM SERPL-SCNC: 142 MMOL/L — SIGNIFICANT CHANGE UP (ref 135–145)
WBC # BLD: 7.9 K/UL — SIGNIFICANT CHANGE UP (ref 3.8–10.5)
WBC # FLD AUTO: 7.9 K/UL — SIGNIFICANT CHANGE UP (ref 3.8–10.5)

## 2018-03-07 PROCEDURE — 78452 HT MUSCLE IMAGE SPECT MULT: CPT | Mod: 26

## 2018-03-07 PROCEDURE — 93018 CV STRESS TEST I&R ONLY: CPT | Mod: GC

## 2018-03-07 PROCEDURE — 93016 CV STRESS TEST SUPVJ ONLY: CPT | Mod: GC

## 2018-03-07 PROCEDURE — 93306 TTE W/DOPPLER COMPLETE: CPT | Mod: 26

## 2018-03-07 RX ORDER — RANITIDINE HYDROCHLORIDE 150 MG/1
150 TABLET, FILM COATED ORAL
Qty: 0 | Refills: 0 | COMMUNITY

## 2018-03-07 RX ORDER — DIVALPROEX SODIUM 500 MG/1
1 TABLET, DELAYED RELEASE ORAL
Qty: 60 | Refills: 0 | OUTPATIENT
Start: 2018-03-07 | End: 2018-04-05

## 2018-03-07 RX ORDER — GABAPENTIN 400 MG/1
100 CAPSULE ORAL
Qty: 0 | Refills: 0 | COMMUNITY

## 2018-03-07 RX ORDER — GABAPENTIN 400 MG/1
1 CAPSULE ORAL
Qty: 90 | Refills: 0
Start: 2018-03-07 | End: 2018-04-05

## 2018-03-07 RX ADMIN — DIVALPROEX SODIUM 250 MILLIGRAM(S): 500 TABLET, DELAYED RELEASE ORAL at 17:20

## 2018-03-07 RX ADMIN — DIVALPROEX SODIUM 250 MILLIGRAM(S): 500 TABLET, DELAYED RELEASE ORAL at 06:44

## 2018-03-07 RX ADMIN — PANTOPRAZOLE SODIUM 40 MILLIGRAM(S): 20 TABLET, DELAYED RELEASE ORAL at 06:44

## 2018-03-07 RX ADMIN — Medication 650 MILLIGRAM(S): at 04:10

## 2018-03-07 RX ADMIN — GABAPENTIN 300 MILLIGRAM(S): 400 CAPSULE ORAL at 06:44

## 2018-03-07 RX ADMIN — AMLODIPINE BESYLATE 5 MILLIGRAM(S): 2.5 TABLET ORAL at 06:44

## 2018-03-07 RX ADMIN — Medication 650 MILLIGRAM(S): at 05:10

## 2018-03-07 RX ADMIN — GABAPENTIN 300 MILLIGRAM(S): 400 CAPSULE ORAL at 13:42

## 2018-03-07 NOTE — PROGRESS NOTE ADULT - ASSESSMENT
51 y/o female with pmhx of ICH (residual left sided weakness/numbness and hyperacusis, baseline difficulty findings words), HTN, GERD, neuropathy presents to ED for gradual onset right sided "pressure-like" nonexertional headache since 8:30pm last night. Now migrating to left side. Intermittent, found relief with Tylenol last night, now constant for last hour. Associated with nausea, photophobia and worsening left hyperacusis, 8/10. States she felt tired and had difficulty concentrating at work today. Sent in by her neurologist Dr. Castellon. Did not take any pain meds today. Pt states takes Gabapentin for left sided UE/LE numbness and feels worse x 2 days. Pt also admitted to left sided chest "pressure" x few hours. Associated with "feeling harder to breathe" although denies SOB. States different than baseline GERD. 8/10. Nonexertional, nonpleuritic, nonradiating. Fam hx of MI mother at age 80. Never had stress test, had normal echo in past. No recent travel, surgeries, ocp use, IUDs, immobilization/recent hospitalization. No fever, chills, palpitations, cough, abd pain, vomiting, new weakness or numbness,     Problem/Plan - 1:  ·  Problem: Headache.  Plan: likely sec to LP  NEURO AND NEUROSURGERY FU  MRI   pain control  will monitor.     Problem/Plan - 2:  ·  Problem: HTN (hypertension).  Plan: cw home meds.       dc if cleared by neuro
Headache.  Plan: likely sec to LP  NEURO AND NEUROSURGERY FU  MRI reviewed  pain control  will monitor.   pt refuses blood patch for now  cw caffeine drip     HTN (hypertension).  Plan: cw home meds.     Bradycardia  cards fu
Headache.  Plan: likely sec to LP  NEURO AND NEUROSURGERY FU  MRI reviewed  pain control  will monitor.   pt refuses blood patch for now  cw caffeine drip    HTN (hypertension).  Plan: cw home meds.     Bradycardia/CP  cards fu  transfer to tele  cards fu
Headache.  Plan: likely sec to LP  NEURO AND NEUROSURGERY FU  MRI reviewed  pain control  will monitor.   pt refuses blood patch for now  cw caffeine drip    HTN (hypertension).  Plan: cw home meds.     Bradycardia/CP  cards fu  transfer to tele  cards fu
Headache.  Plan: likely sec to LP  NEURO AND NEUROSURGERY FU  MRI reviewed  pain control  will monitor.   pt refuses blood patch for now  cw caffeine drip    HTN (hypertension).  Plan: cw home meds.     Bradycardia/CP  cards fu  transfer to tele  cards fu  elsy oneal dc planning
Problem/Plan - 1:  ·  Problem: Headache.  Plan: likely sec to LP  NEURO AND NEUROSURGERY FU  MRI reviewed  pain control  will monitor.     Problem/Plan - 2:  ·  Problem: HTN (hypertension).  Plan: cw home meds.       dc once cleared by neuro
Problem/Plan - 1:  ·  Problem: Headache.  Plan: likely sec to LP  NEURO AND NEUROSURGERY FU  MRI reviewed  pain control  will monitor.   pt refuses blood patch for now    Problem/Plan - 2:  ·  Problem: HTN (hypertension).  Plan: cw home meds.

## 2018-03-07 NOTE — PROGRESS NOTE ADULT - PROVIDER SPECIALTY LIST ADULT
Anesthesia
Cardiology
Cardiology
Hospitalist
Intervent Cardiology
Neurology
Cardiology
Intervent Cardiology
Hospitalist

## 2018-03-07 NOTE — PROGRESS NOTE ADULT - SUBJECTIVE AND OBJECTIVE BOX
S: no chest pain or sob         acetaminophen   Tablet. 650 milliGRAM(s) Oral every 6 hours PRN  amLODIPine   Tablet 5 milliGRAM(s) Oral daily  diVALproex  milliGRAM(s) Oral two times a day  gabapentin 300 milliGRAM(s) Oral three times a day  metoclopramide Injectable 10 milliGRAM(s) IV Push every 8 hours PRN  pantoprazole    Tablet 40 milliGRAM(s) Oral before breakfast                            13.6   7.90  )-----------( 225      ( 07 Mar 2018 05:50 )             40.3       03-07    142  |  100  |  8   ----------------------------<  118<H>  3.5   |  26  |  0.68    Ca    9.5      07 Mar 2018 05:50  Mg     1.8     03-07        CARDIAC MARKERS ( 06 Mar 2018 06:08 )  x     / < 0.06 ng/mL / 40 u/L / x     / x      CARDIAC MARKERS ( 06 Mar 2018 00:40 )  x     / < 0.06 ng/mL / 73 u/L / x     / x      CARDIAC MARKERS ( 05 Mar 2018 18:10 )  x     / < 0.06 ng/mL / 54 u/L / 1.34 ng/mL / x            T(C): 36.6 (03-07-18 @ 06:43), Max: 36.7 (03-06-18 @ 17:37)  HR: 60 (03-07-18 @ 06:43) (55 - 61)  BP: 128/57 (03-07-18 @ 06:43) (128/57 - 139/88)  RR: 18 (03-07-18 @ 06:43) (18 - 18)  SpO2: 100% (03-07-18 @ 06:43) (98% - 100%)  Wt(kg): --    I&O's Summary        Heart: normal S1, S2, RRR, no m/r/g  Lungs: cta b/l  Abd: soft nT, nD  Ext: no edema         TELEMETRY: SR	    ECG: SB	  RADIOLOGY:   DIAGNOSTIC TESTING:  [ ] Echocardiogram:   [ ]  Catheterization:  [ ] Stress Test:    OTHER: 	      ASSESSMENT/PLAN: 	52y Female with history of HTN, GERD, ICH who is being seen for chest pain.   -ECG with no st-t changes suggestive of ischemia  -TTE with normal LV function  -NST normal  -No further cardiac workup needed at this time      Naresh Eaton MD  [facility]  [address] [place] [address], [address]  [place] [address]  office: [phone]  pager: [phone]

## 2018-03-07 NOTE — PROGRESS NOTE ADULT - SUBJECTIVE AND OBJECTIVE BOX
SUBJECTIVE: No CP or SOB    MEDICATIONS  (STANDING):  amLODIPine   Tablet 5 milliGRAM(s) Oral daily  diVALproex  milliGRAM(s) Oral two times a day  gabapentin 300 milliGRAM(s) Oral three times a day  pantoprazole    Tablet 40 milliGRAM(s) Oral before breakfast    LABS:                        13.6   7.90  )-----------( 225      ( 07 Mar 2018 05:50 )             40.3    142  |  100  |  8   ----------------------------<  118<H>  3.5   |  26  |  0.68    Ca    9.5      07 Mar 2018 05:50  Mg     1.8     03-07    CARDIAC MARKERS ( 06 Mar 2018 06:08 )  x     / < 0.06 ng/mL / 40 u/L / x     / x      CARDIAC MARKERS ( 06 Mar 2018 00:40 )  x     / < 0.06 ng/mL / 73 u/L / x     / x      CARDIAC MARKERS ( 05 Mar 2018 18:10 )  x     / < 0.06 ng/mL / 54 u/L / 1.34 ng/mL / x        PHYSICAL EXAM:  Vital Signs Last 24 Hrs  T(C): 36.6 (07 Mar 2018 06:43), Max: 36.7 (06 Mar 2018 11:00)  T(F): 97.8 (07 Mar 2018 06:43), Max: 98.1 (06 Mar 2018 17:37)  HR: 60 (07 Mar 2018 06:43) (55 - 61)  BP: 128/57 (07 Mar 2018 06:43) (120/73 - 139/88)  RR: 18 (07 Mar 2018 06:43) (18 - 18)  SpO2: 100% (07 Mar 2018 06:43) (98% - 100%)    Cardiovascular:  S1S2 RRR, No JVD  Respiratory: Lungs clear to auscultation, normal effort  Gastrointestinal: Abdomen soft, ND, NT, +BS  Skin: Warm, dry, intact. No rash.  Musculoskeletal: Normal ROM, normal strength  Ext: No C/C/E B/L LE    DIAGNOSTIC DATA  TELEMETRY: SR    < from: Transthoracic Echocardiogram (03.07.18 @ 07:38) >  CONCLUSIONS:  1. Mitral annular calcification, otherwise normal mitral  valve. Minimal mitral regurgitation.  2. Calcified trileaflet aortic valve with normal opening.  Mild aortic regurgitation.  3. Moderately dilated left atrium.  LA volume index = 43  cc/m2.  4. Normal left ventricular internal dimensions and wall  thicknesses.  5. Normal left ventricular systolic function. No segmental  wall motion abnormalities.  6. Normal right ventricular size and function.  ------------------------------------------------------------------------    < end of copied text >      ASSESSMENT AND PLAN:  53 y/o female with pmhx of ICH (residual left sided weakness/numbness and hyperacusis, baseline difficulty findings words), HTN, GERD, neuropathy presents to ED for headaches, fatigue, and we were asked to evaluate her chest pain.     --ACS ruled out with serial CE  --No need for urgent cardiac cath  --F/U NST    Delaney Saenz PA-C  [facility]  [address]   [place] [address]  office [phone]  pager [phone]

## 2018-03-07 NOTE — PROGRESS NOTE ADULT - SUBJECTIVE AND OBJECTIVE BOX
Subjective: patient seen/examined . no chest pain. NAD  	  MEDICATIONS:  MEDICATIONS  (STANDING):  amLODIPine   Tablet 5 milliGRAM(s) Oral daily  diVALproex  milliGRAM(s) Oral two times a day  gabapentin 300 milliGRAM(s) Oral three times a day  pantoprazole    Tablet 40 milliGRAM(s) Oral before breakfast      LABS:	 	    CARDIAC MARKERS:  CARDIAC MARKERS ( 06 Mar 2018 06:08 )  x     / < 0.06 ng/mL / 40 u/L / x     / x      CARDIAC MARKERS ( 06 Mar 2018 00:40 )  x     / < 0.06 ng/mL / 73 u/L / x     / x      CARDIAC MARKERS ( 05 Mar 2018 18:10 )  x     / < 0.06 ng/mL / 54 u/L / 1.34 ng/mL / x                                    13.6   7.90  )-----------( 225      ( 07 Mar 2018 05:50 )             40.3     Hemoglobin: 13.6 g/dL (03-07 @ 05:50)  Hemoglobin: 13.0 g/dL (03-06 @ 06:08)  Hemoglobin: 11.9 g/dL (03-05 @ 05:05)  Hemoglobin: 12.9 g/dL (03-04 @ 05:40)  Hemoglobin: 12.5 g/dL (03-03 @ 05:30)      03-07    142  |  100  |  8   ----------------------------<  118<H>  3.5   |  26  |  0.68    Ca    9.5      07 Mar 2018 05:50  Mg     1.8     03-07      Creatinine Trend: 0.68<--, 0.63<--, 0.65<--, 0.70<--, 0.61<--, 0.70<--        PHYSICAL EXAM:  T(C): 36.6 (03-07-18 @ 13:50), Max: 36.7 (03-06-18 @ 17:37)  HR: 59 (03-07-18 @ 13:50) (55 - 61)  BP: 137/65 (03-07-18 @ 13:50) (127/62 - 139/88)  RR: 16 (03-07-18 @ 13:50) (16 - 18)  SpO2: 98% (03-07-18 @ 13:50) (98% - 100%)  Wt(kg): --  I&O's Summary        HEENT:   Normal oral mucosa, PERRL, EOMI	  Lymphatic: No obvious lymphadenopathy , no edema  Cardiovascular: Normal S1 S2, No JVD, 1/6 FLACO murmur, Peripheral pulses palpable 2+ bilaterally  Respiratory: Lungs clear to auscultation, normal effort 	  Gastrointestinal:  Soft, Non-tender, + BS	  Skin: No rashes,  No cyanosis, warm to touch  Musculoskeletal: Normal range of motion, normal strength  Psychiatry:  Appropriate Mood & affect   ext: no clubbing/cyanosis/edema    TELEMETRY: 	NSR    ECG:  	  	      ASSESSMENT/PLAN: 	52y Female with pmhx of IC bleed/htn admitted with chest pain  1) echo with normal LV function  2) tst with no ischemia  3) no need for cath  4) optimize BP meds

## 2018-03-07 NOTE — PROGRESS NOTE ADULT - SUBJECTIVE AND OBJECTIVE BOX
Patient is a 52y old  Female who presents with a chief complaint of c/o headache, Lt side weakness, burning sensation ,numbness (05 Mar 2018 18:33)      INTERVAL HPI/OVERNIGHT EVENTS:  T(C): 36.4 (03-07-18 @ 17:32), Max: 36.7 (03-07-18 @ 10:39)  HR: 61 (03-07-18 @ 17:32) (55 - 61)  BP: 127/93 (03-07-18 @ 17:32) (127/62 - 139/88)  RR: 18 (03-07-18 @ 17:32) (16 - 18)  SpO2: 100% (03-07-18 @ 17:32) (98% - 100%)  Wt(kg): --  I&O's Summary      LABS:                        13.6   7.90  )-----------( 225      ( 07 Mar 2018 05:50 )             40.3     03-07    142  |  100  |  8   ----------------------------<  118<H>  3.5   |  26  |  0.68    Ca    9.5      07 Mar 2018 05:50  Mg     1.8     03-07          CAPILLARY BLOOD GLUCOSE                MEDICATIONS  (STANDING):  amLODIPine   Tablet 5 milliGRAM(s) Oral daily  diVALproex  milliGRAM(s) Oral two times a day  gabapentin 300 milliGRAM(s) Oral three times a day  pantoprazole    Tablet 40 milliGRAM(s) Oral before breakfast    MEDICATIONS  (PRN):  acetaminophen   Tablet. 650 milliGRAM(s) Oral every 6 hours PRN Mild, Moderate, and Severe Pain  metoclopramide Injectable 10 milliGRAM(s) IV Push every 8 hours PRN Headache          PHYSICAL EXAM:  GENERAL: NAD, well-groomed, well-developed  HEAD:  Atraumatic, Normocephalic  CHEST/LUNG: Clear to percussion bilaterally; No rales, rhonchi, wheezing, or rubs  HEART: Regular rate and rhythm; No murmurs, rubs, or gallops  ABDOMEN: Soft, Nontender, Nondistended; Bowel sounds present  EXTREMITIES:  2+ Peripheral Pulses, No clubbing, cyanosis, or edema  LYMPH: No lymphadenopathy noted  SKIN: No rashes or lesions    Care Discussed with Consultants/Other Providers [ +] YES  [ ] NO

## 2018-03-14 PROBLEM — I61.9 NONTRAUMATIC INTRACEREBRAL HEMORRHAGE, UNSPECIFIED: Chronic | Status: ACTIVE | Noted: 2018-02-27

## 2018-04-23 ENCOUNTER — APPOINTMENT (OUTPATIENT)
Dept: NEUROLOGY | Facility: CLINIC | Age: 53
End: 2018-04-23

## 2018-05-02 ENCOUNTER — APPOINTMENT (OUTPATIENT)
Dept: NEUROLOGY | Facility: CLINIC | Age: 53
End: 2018-05-02
Payer: COMMERCIAL

## 2018-05-02 VITALS
HEART RATE: 60 BPM | HEIGHT: 65 IN | SYSTOLIC BLOOD PRESSURE: 121 MMHG | WEIGHT: 160 LBS | BODY MASS INDEX: 26.66 KG/M2 | OXYGEN SATURATION: 96 % | DIASTOLIC BLOOD PRESSURE: 74 MMHG | TEMPERATURE: 97.7 F

## 2018-05-02 PROCEDURE — 99215 OFFICE O/P EST HI 40 MIN: CPT

## 2018-05-29 ENCOUNTER — APPOINTMENT (OUTPATIENT)
Dept: PHYSICAL MEDICINE AND REHAB | Facility: CLINIC | Age: 53
End: 2018-05-29
Payer: COMMERCIAL

## 2018-05-29 VITALS
TEMPERATURE: 98.1 F | DIASTOLIC BLOOD PRESSURE: 68 MMHG | HEART RATE: 62 BPM | OXYGEN SATURATION: 93 % | SYSTOLIC BLOOD PRESSURE: 109 MMHG

## 2018-05-29 PROCEDURE — 99214 OFFICE O/P EST MOD 30 MIN: CPT

## 2018-08-14 ENCOUNTER — APPOINTMENT (OUTPATIENT)
Dept: NEUROLOGY | Facility: CLINIC | Age: 53
End: 2018-08-14
Payer: COMMERCIAL

## 2018-08-14 VITALS
HEART RATE: 62 BPM | DIASTOLIC BLOOD PRESSURE: 86 MMHG | BODY MASS INDEX: 28.16 KG/M2 | HEIGHT: 65 IN | SYSTOLIC BLOOD PRESSURE: 128 MMHG | WEIGHT: 169 LBS

## 2018-08-14 PROCEDURE — 99215 OFFICE O/P EST HI 40 MIN: CPT

## 2018-08-14 RX ORDER — GABAPENTIN 100 MG/1
100 CAPSULE ORAL 3 TIMES DAILY
Qty: 90 | Refills: 3 | Status: DISCONTINUED | COMMUNITY
Start: 2017-03-29 | End: 2018-08-14

## 2018-08-14 RX ORDER — GABAPENTIN 300 MG/1
300 CAPSULE ORAL
Qty: 90 | Refills: 0 | Status: DISCONTINUED | COMMUNITY
Start: 2018-04-06 | End: 2018-08-14

## 2018-08-17 LAB
ALBUMIN SERPL ELPH-MCNC: 4.6 G/DL
ALP BLD-CCNC: 63 U/L
ALT SERPL-CCNC: 23 U/L
ANION GAP SERPL CALC-SCNC: 15 MMOL/L
AST SERPL-CCNC: 17 U/L
BASOPHILS # BLD AUTO: 0.02 K/UL
BASOPHILS NFR BLD AUTO: 0.3 %
BILIRUB SERPL-MCNC: 0.4 MG/DL
BUN SERPL-MCNC: 9 MG/DL
CALCIUM SERPL-MCNC: 10 MG/DL
CHLORIDE SERPL-SCNC: 100 MMOL/L
CO2 SERPL-SCNC: 28 MMOL/L
CREAT SERPL-MCNC: 0.66 MG/DL
EOSINOPHIL # BLD AUTO: 0.11 K/UL
EOSINOPHIL NFR BLD AUTO: 1.8 %
GLUCOSE SERPL-MCNC: 121 MG/DL
HCT VFR BLD CALC: 41.6 %
HGB BLD-MCNC: 13.3 G/DL
IMM GRANULOCYTES NFR BLD AUTO: 0.3 %
LYMPHOCYTES # BLD AUTO: 3.14 K/UL
LYMPHOCYTES NFR BLD AUTO: 50.6 %
MAN DIFF?: NORMAL
MCHC RBC-ENTMCNC: 28.1 PG
MCHC RBC-ENTMCNC: 32 GM/DL
MCV RBC AUTO: 87.9 FL
MONOCYTES # BLD AUTO: 0.27 K/UL
MONOCYTES NFR BLD AUTO: 4.3 %
NEUTROPHILS # BLD AUTO: 2.65 K/UL
NEUTROPHILS NFR BLD AUTO: 42.7 %
PLATELET # BLD AUTO: 284 K/UL
POTASSIUM SERPL-SCNC: 4.2 MMOL/L
PROT SERPL-MCNC: 7.2 G/DL
RBC # BLD: 4.73 M/UL
RBC # FLD: 14.2 %
SODIUM SERPL-SCNC: 143 MMOL/L
WBC # FLD AUTO: 6.21 K/UL

## 2018-10-31 ENCOUNTER — APPOINTMENT (OUTPATIENT)
Dept: PAIN MANAGEMENT | Facility: CLINIC | Age: 53
End: 2018-10-31
Payer: COMMERCIAL

## 2018-10-31 VITALS
HEIGHT: 65 IN | DIASTOLIC BLOOD PRESSURE: 85 MMHG | HEART RATE: 66 BPM | BODY MASS INDEX: 28.16 KG/M2 | SYSTOLIC BLOOD PRESSURE: 127 MMHG | WEIGHT: 169 LBS

## 2018-10-31 PROCEDURE — 99215 OFFICE O/P EST HI 40 MIN: CPT

## 2018-12-07 ENCOUNTER — APPOINTMENT (OUTPATIENT)
Dept: PAIN MANAGEMENT | Facility: CLINIC | Age: 53
End: 2018-12-07

## 2018-12-11 ENCOUNTER — APPOINTMENT (OUTPATIENT)
Dept: PAIN MANAGEMENT | Facility: CLINIC | Age: 53
End: 2018-12-11
Payer: COMMERCIAL

## 2018-12-11 VITALS
HEART RATE: 60 BPM | HEIGHT: 65 IN | BODY MASS INDEX: 28.16 KG/M2 | WEIGHT: 169 LBS | SYSTOLIC BLOOD PRESSURE: 120 MMHG | DIASTOLIC BLOOD PRESSURE: 74 MMHG

## 2018-12-11 PROCEDURE — 99213 OFFICE O/P EST LOW 20 MIN: CPT

## 2019-02-14 ENCOUNTER — APPOINTMENT (OUTPATIENT)
Dept: NEUROLOGY | Facility: CLINIC | Age: 54
End: 2019-02-14
Payer: COMMERCIAL

## 2019-02-14 VITALS
HEART RATE: 67 BPM | WEIGHT: 169 LBS | BODY MASS INDEX: 28.16 KG/M2 | HEIGHT: 65 IN | DIASTOLIC BLOOD PRESSURE: 81 MMHG | SYSTOLIC BLOOD PRESSURE: 130 MMHG

## 2019-02-14 PROCEDURE — 93880 EXTRACRANIAL BILAT STUDY: CPT

## 2019-02-14 PROCEDURE — 93886 INTRACRANIAL COMPLETE STUDY: CPT

## 2019-02-14 PROCEDURE — 93892 TCD EMBOLI DETECT W/O INJ: CPT

## 2019-02-14 PROCEDURE — 99215 OFFICE O/P EST HI 40 MIN: CPT

## 2019-02-15 ENCOUNTER — OTHER (OUTPATIENT)
Age: 54
End: 2019-02-15

## 2019-02-25 ENCOUNTER — FORM ENCOUNTER (OUTPATIENT)
Age: 54
End: 2019-02-25

## 2019-02-26 ENCOUNTER — APPOINTMENT (OUTPATIENT)
Dept: MRI IMAGING | Facility: CLINIC | Age: 54
End: 2019-02-26
Payer: COMMERCIAL

## 2019-02-26 ENCOUNTER — OUTPATIENT (OUTPATIENT)
Dept: OUTPATIENT SERVICES | Facility: HOSPITAL | Age: 54
LOS: 1 days | End: 2019-02-26
Payer: COMMERCIAL

## 2019-02-26 DIAGNOSIS — Z00.8 ENCOUNTER FOR OTHER GENERAL EXAMINATION: ICD-10-CM

## 2019-02-26 DIAGNOSIS — H26.40 UNSPECIFIED SECONDARY CATARACT: Chronic | ICD-10-CM

## 2019-02-26 PROCEDURE — 70546 MR ANGIOGRAPH HEAD W/O&W/DYE: CPT | Mod: 26,59

## 2019-02-26 PROCEDURE — 70553 MRI BRAIN STEM W/O & W/DYE: CPT | Mod: 26

## 2019-02-26 PROCEDURE — 70549 MR ANGIOGRAPH NECK W/O&W/DYE: CPT | Mod: 26

## 2019-02-26 PROCEDURE — A9585: CPT

## 2019-02-26 PROCEDURE — 70546 MR ANGIOGRAPH HEAD W/O&W/DYE: CPT

## 2019-02-26 PROCEDURE — 70553 MRI BRAIN STEM W/O & W/DYE: CPT

## 2019-02-26 PROCEDURE — 70549 MR ANGIOGRAPH NECK W/O&W/DYE: CPT

## 2019-03-05 ENCOUNTER — APPOINTMENT (OUTPATIENT)
Dept: PAIN MANAGEMENT | Facility: CLINIC | Age: 54
End: 2019-03-05
Payer: COMMERCIAL

## 2019-03-05 VITALS
WEIGHT: 172 LBS | BODY MASS INDEX: 28.66 KG/M2 | HEART RATE: 55 BPM | HEIGHT: 65 IN | DIASTOLIC BLOOD PRESSURE: 82 MMHG | SYSTOLIC BLOOD PRESSURE: 128 MMHG

## 2019-03-05 PROCEDURE — 99213 OFFICE O/P EST LOW 20 MIN: CPT

## 2019-03-05 NOTE — REVIEW OF SYSTEMS
[Fever] : no fever [Chills] : no chills [Palpitations] : no palpitations [Shortness Of Breath] : no shortness of breath [Dizziness] : no dizziness

## 2019-03-05 NOTE — PHYSICAL EXAM
[General Appearance - Alert] : alert [Oriented To Time, Place, And Person] : oriented to person, place, and time [Affect] : the affect was normal [Mood] : the mood was normal [Cranial Nerves Facial (VII)] : face symmetrical [Cranial Nerves Accessory (XI - Cranial And Spinal)] : head turning and shoulder shrug symmetric [Cranial Nerves Hypoglossal (XII)] : there was no tongue deviation with protrusion [Sclera] : the sclera and conjunctiva were normal [PERRL With Normal Accommodation] : pupils were equal in size, round, reactive to light, with normal accommodation [Extraocular Movements] : extraocular movements were intact [] : no respiratory distress [Respiration, Rhythm And Depth] : normal respiratory rhythm and effort [Paresis Pronator Drift Right-Sided] : no pronator drift on the right [Paresis Pronator Drift Left-Sided] : no pronator drift on the left [FreeTextEntry8] : Walks with a cane.

## 2019-03-05 NOTE — HISTORY OF PRESENT ILLNESS
[Headache] : headache [___ Times Per Week] : [unfilled] times each week [FreeTextEntry1] : Returns today for a follow up visit. Continues to have headaches and lower left leg burning sensation. Peppermint Oil and acupuncture are helpful. \par Acupuncture helps headaches for a couple of days after the session .Pt has acupuncture done 1x/ week. \par Recently saw her PCP and Cymbalta 30mg was added to her medication.\par Also had a consult with Dr Castellon , testing ordered and being followed by Dr Castellon.\par \par Pt feels Gabapentin has not been helpful for the burning pain in her leg and has reduced the dose to 400mg / day. \par Would also like to discontinue Divalproex due to weight gain. \par I asked her to discuss this with her epileptologist. She explains she was prescribed it for headache , even so, I believe it may also serve as protection for seizures and we should get clearance from her Epileptologist. \par \par \par

## 2019-03-05 NOTE — ASSESSMENT
[FreeTextEntry1] : continue with acupuncture and peppermint oil. \par Pt to discuss weaning off divalproex with Epileptologist. \par Continue Cymbalta 30mg.

## 2019-03-11 LAB
BUN SERPL-MCNC: 11 MG/DL
CREAT SERPL-MCNC: 0.61 MG/DL

## 2019-03-12 ENCOUNTER — APPOINTMENT (OUTPATIENT)
Dept: NEUROLOGY | Facility: CLINIC | Age: 54
End: 2019-03-12
Payer: COMMERCIAL

## 2019-03-12 VITALS
DIASTOLIC BLOOD PRESSURE: 84 MMHG | BODY MASS INDEX: 28.66 KG/M2 | HEART RATE: 65 BPM | SYSTOLIC BLOOD PRESSURE: 118 MMHG | HEIGHT: 65 IN | WEIGHT: 172 LBS

## 2019-03-12 DIAGNOSIS — I61.0 NONTRAUMATIC INTRACEREBRAL HEMORRHAGE IN HEMISPHERE, SUBCORTICAL: ICD-10-CM

## 2019-03-12 DIAGNOSIS — Z86.73 PERSONAL HISTORY OF TRANSIENT ISCHEMIC ATTACK (TIA), AND CEREBRAL INFARCTION W/OUT RESIDUAL DEFICITS: ICD-10-CM

## 2019-03-12 PROCEDURE — 99214 OFFICE O/P EST MOD 30 MIN: CPT

## 2019-03-17 PROBLEM — Z86.73 HISTORY OF CEREBROVASCULAR ACCIDENT: Status: RESOLVED | Noted: 2017-03-30 | Resolved: 2019-03-17

## 2019-03-17 PROBLEM — I61.0 NONTRAUMATIC SUBCORTICAL HEMORRHAGE OF RIGHT CEREBRAL HEMISPHERE: Status: RESOLVED | Noted: 2017-01-11 | Resolved: 2019-03-17

## 2019-03-17 NOTE — ASSESSMENT
[FreeTextEntry1] : Ms. JACKSON had an ICH event and associated with that had a number of focal neurological events including painful paresthesias frequently and episodes of face tightness and pulling that have not recurred since initial presentation. It is not clear that she needs to be on seizure medication. However, if she is going to take a low dose, it should be extended release formulation. Review of recent MRI show that she has hemosiderin deposition in the R posterior insula, in region likely corresponding to pain representation.  \par \par Plan\par 1. change divalproex  daily - consider trial of sodium channel blocker like oxcarbazepine for possible epileptic etiology of pain vs possible CSD events in lesioned cortex - consider memantine trial. \par 2. amb EEG x 48h \par 3. RTC 4 wks. \par 4. advised to not resume driving at this time until work-up is complete. \par \par Greater than 50% of the encounter time was spent on counseling and coordination of care for reviewing records in [employer], discussion with patient regarding plan. I have spent 25 minutes of face to face time with the patient reviewing the cause of seizures or seizure-like events, assessing the risk of recurrence, educating the patient or family to recognize seizures, and discussing possible treatment options and possible side effects of seizure medications. I also discussed seizure safety, and ways of reducing seizure risk.

## 2019-03-17 NOTE — HISTORY OF PRESENT ILLNESS
[FreeTextEntry1] : *** 03/12/2019 ***\par Ms. JACKSON returns for follow-up. She has not had any interval seizures. She is taking a very small dose of divalproex  daily, and gabapentin 400mg three times daily. Her history of seizures - documented below - is unclear. She denies any side effects. She is working as an . \par \par *** 04/12/2017 ***\par Ms. Jackson is a 52-year-old right-handed woman who developed intracerebral hemorrhage in December 2016 affecting the right putamen and subinsular white matter. Past medical history significant for hypertension. She has been seen by Dr. Castellon of the stroke service, and Dr. Livingston of the neuromuscular service. Her initial symptoms of ICH consisted of severe headache with some left-sided numbness. After she was diagnosed with intracerebral hemorrhage, she complained of painful paresthesias and cramping in the left leg that are present nearly every day, and fluctuate in intensity throughout the day. She had previously been on atorvastatin, which was discontinued out of concern for her cramping. She also complains of "pulling" in her left sided the neck and face. These symptoms also are present intermittently throughout the day, and never entirely go away. She started taking gabapentin for some of the painful paresthesias in the leg, and reports some benefit. She does not take it everyday.\par \par On February 6, she experienced a single episode of painful paresthesias in the jaw. Thereafter, she was prescribed Keppra 500 mg twice a day. She felt very tired on Keppra, and the dose was reduced to 500 mg at bedtime. She has not had recurrence of the episodes. She ran out of Keppra 2 nights ago, and did not take her dose last night. She continues to feel tired, and has not had the episodes recur thus far.\par \par She is accompanied by her son. She and her son both deny any episodes of altered awareness, lip smacking, staring, facial twitching, or other signs of convulsion. She denies incontinence or tongue biting.\par \par Patient endorses difficulty concentrating, anxiety, mild intermittent headaches, intermittent dizziness, numbness and tingling as described, worsened balance.

## 2019-03-29 ENCOUNTER — APPOINTMENT (OUTPATIENT)
Dept: NEUROLOGY | Facility: CLINIC | Age: 54
End: 2019-03-29
Payer: COMMERCIAL

## 2019-03-29 PROCEDURE — 95816 EEG AWAKE AND DROWSY: CPT

## 2019-03-30 PROCEDURE — 95953: CPT

## 2019-03-31 PROCEDURE — 95953: CPT

## 2019-04-22 ENCOUNTER — APPOINTMENT (OUTPATIENT)
Dept: NEUROLOGY | Facility: CLINIC | Age: 54
End: 2019-04-22
Payer: COMMERCIAL

## 2019-04-22 VITALS
SYSTOLIC BLOOD PRESSURE: 128 MMHG | HEIGHT: 65 IN | DIASTOLIC BLOOD PRESSURE: 81 MMHG | HEART RATE: 63 BPM | BODY MASS INDEX: 28.49 KG/M2 | WEIGHT: 171 LBS

## 2019-04-22 DIAGNOSIS — G40.909 EPILEPSY, UNSPECIFIED, NOT INTRACTABLE, W/OUT STATUS EPILEPTICUS: ICD-10-CM

## 2019-04-22 PROCEDURE — 99215 OFFICE O/P EST HI 40 MIN: CPT

## 2019-04-23 PROBLEM — G40.909 SEIZURE DISORDER: Noted: 2017-02-14

## 2019-04-23 NOTE — HISTORY OF PRESENT ILLNESS
[FreeTextEntry1] : *** 04/22/2019  ***\par Ms. STEVIE JACKSON returns for scheduled follow-up appointment. Ms. JACKSON reports that in the interval since her last visit, she is doing well. She reduced divalproex ER to 250 qHS and has had no interval events. However, she feels that her behavior is subtly different, she is less inhibited when confronted with problem, her headaches are slightly worse, she has somewhat more difficult concentrating. Ms. JACKSON attributes these changes to her reduction of divalproex. Ms. JACKSON also elaborated on her chronic pain, which she describes as burning dysesthesias in the left leg.  She recently had venous procedure (?stripping) in both legs by vascular surgeon that she feels improved pain slightly.  The burning sensation is constant, limits her ability to walk distances (which increases pain).\par \par *** 03/12/2019 ***\par Ms. JACKSON returns for follow-up. She has not had any interval seizures. She is taking a very small dose of divalproex  daily, and gabapentin 400mg three times daily. Her history of seizures - documented below - is unclear. She denies any side effects. She is working as an . \par \par *** 04/12/2017 ***\par Ms. Jackson is a 52-year-old right-handed woman who developed intracerebral hemorrhage in December 2016 affecting the right putamen and subinsular white matter. Past medical history significant for hypertension. She has been seen by Dr. Castellon of the stroke service, and Dr. Livingston of the neuromuscular service. Her initial symptoms of ICH consisted of severe headache with some left-sided numbness. After she was diagnosed with intracerebral hemorrhage, she complained of painful paresthesias and cramping in the left leg that are present nearly every day, and fluctuate in intensity throughout the day. She had previously been on atorvastatin, which was discontinued out of concern for her cramping. She also complains of "pulling" in her left sided the neck and face. These symptoms also are present intermittently throughout the day, and never entirely go away. She started taking gabapentin for some of the painful paresthesias in the leg, and reports some benefit. She does not take it everyday.\par \par On February 6, she experienced a single episode of painful paresthesias in the jaw. Thereafter, she was prescribed Keppra 500 mg twice a day. She felt very tired on Keppra, and the dose was reduced to 500 mg at bedtime. She has not had recurrence of the episodes. She ran out of Keppra 2 nights ago, and did not take her dose last night. She continues to feel tired, and has not had the episodes recur thus far.\par \par She is accompanied by her son. She and her son both deny any episodes of altered awareness, lip smacking, staring, facial twitching, or other signs of convulsion. She denies incontinence or tongue biting.\par \par Patient endorses difficulty concentrating, anxiety, mild intermittent headaches, intermittent dizziness, numbness and tingling as described, worsened balance.

## 2019-04-23 NOTE — ASSESSMENT
[FreeTextEntry1] : Ms. JACKSON had an ICH event and associated with that had a number of focal neurological events including painful burning paresthesias continuously, numbness in left foot, and episodes of face tightness and pulling that have not recurred since initial presentation. It is not clear that she needs to be on seizure medication. In hindsight it seems lilkely that divalproex was providing a mood stabilizing benefit. However, she is motivated to dc divalproex because of weight gain.  I believe her chronic L LE pain is due to location of hemorrhage and residual hemosiderin beneath pain representation of R posterior insula.  Ms. JACKSON also endorses other symptoms - difficulty understanding first syllable of words or sentences, difficulty concentrating. Amb EEG is normal.\par \par Plan\par 1. DC divalproex  daily\par 2. start oxcarbazepine 150 q12, increase to 300 q12 in 2 weeks.  Ms. JACKSON was counselled on risk of rash or allergy, including rash affecting mucosal surfaces or soles or palms. In event of any rash, she should dc oxcarbazepine and call office. \par 3. RTC 6 wks.\par \par Greater than 50% of the encounter time was spent on counseling and coordination of care for reviewing records in [employer], discussion with patient regarding plan. I have spent 40 minutes of face to face time with the patient reviewing the cause of seizures or seizure-like events, assessing the risk of recurrence, educating the patient or family to recognize seizures, and discussing possible treatment options and possible side effects of seizure medications. I also discussed seizure safety, and ways of reducing seizure risk. We reviewed a length MRI findings and discussed etiology of pain symptoms and plan to try oxcarbazepine to improve L LE burning dysesthetic pain.

## 2019-04-30 ENCOUNTER — APPOINTMENT (OUTPATIENT)
Dept: PAIN MANAGEMENT | Facility: CLINIC | Age: 54
End: 2019-04-30

## 2019-05-02 ENCOUNTER — RX RENEWAL (OUTPATIENT)
Age: 54
End: 2019-05-02

## 2019-05-14 ENCOUNTER — APPOINTMENT (OUTPATIENT)
Dept: PAIN MANAGEMENT | Facility: CLINIC | Age: 54
End: 2019-05-14

## 2019-05-26 LAB
ALBUMIN SERPL ELPH-MCNC: 4.4 G/DL
ALP BLD-CCNC: 83 U/L
ALT SERPL-CCNC: 18 U/L
ANION GAP SERPL CALC-SCNC: 17 MMOL/L
AST SERPL-CCNC: 15 U/L
BASOPHILS # BLD AUTO: 0.03 K/UL
BASOPHILS NFR BLD AUTO: 0.6 %
BILIRUB SERPL-MCNC: 0.3 MG/DL
BUN SERPL-MCNC: 12 MG/DL
CALCIUM SERPL-MCNC: 9.1 MG/DL
CHLORIDE SERPL-SCNC: 102 MMOL/L
CO2 SERPL-SCNC: 24 MMOL/L
CREAT SERPL-MCNC: 0.72 MG/DL
EOSINOPHIL # BLD AUTO: 0.12 K/UL
EOSINOPHIL NFR BLD AUTO: 2.3 %
GLUCOSE SERPL-MCNC: 151 MG/DL
HCT VFR BLD CALC: 39 %
HGB BLD-MCNC: 12.6 G/DL
IMM GRANULOCYTES NFR BLD AUTO: 0.4 %
LYMPHOCYTES # BLD AUTO: 1.93 K/UL
LYMPHOCYTES NFR BLD AUTO: 36.6 %
MAN DIFF?: NORMAL
MCHC RBC-ENTMCNC: 28.2 PG
MCHC RBC-ENTMCNC: 32.3 GM/DL
MCV RBC AUTO: 87.2 FL
MONOCYTES # BLD AUTO: 0.34 K/UL
MONOCYTES NFR BLD AUTO: 6.4 %
NEUTROPHILS # BLD AUTO: 2.84 K/UL
NEUTROPHILS NFR BLD AUTO: 53.7 %
PLATELET # BLD AUTO: 257 K/UL
POTASSIUM SERPL-SCNC: 4.3 MMOL/L
PROT SERPL-MCNC: 6.6 G/DL
RBC # BLD: 4.47 M/UL
RBC # FLD: 13.3 %
SODIUM SERPL-SCNC: 142 MMOL/L
WBC # FLD AUTO: 5.28 K/UL

## 2019-05-31 LAB — OXCARBAZEPINE SERPL-MCNC: 19 UG/ML

## 2019-06-03 ENCOUNTER — APPOINTMENT (OUTPATIENT)
Dept: NEUROLOGY | Facility: CLINIC | Age: 54
End: 2019-06-03
Payer: COMMERCIAL

## 2019-06-03 VITALS
HEART RATE: 56 BPM | SYSTOLIC BLOOD PRESSURE: 146 MMHG | WEIGHT: 176 LBS | BODY MASS INDEX: 29.32 KG/M2 | DIASTOLIC BLOOD PRESSURE: 90 MMHG | HEIGHT: 65 IN

## 2019-06-03 PROCEDURE — 99214 OFFICE O/P EST MOD 30 MIN: CPT

## 2019-06-03 NOTE — HISTORY OF PRESENT ILLNESS
[FreeTextEntry1] : *** 06/03/2019  ***\par Ms. JACKSON reports that since stopping divalproex her HA are worse, mood more volatile.  She also has chronic post-stroke pain that is improved with gabapentin  Ms. JACKSON does not think divalproex change affected pain.  Since dc'ing divalproex, Ms. JACKSON feels she has continued to gain weight.  She is unhappy with the current state of her health. \par \par *** 04/22/2019  ***\par Ms. STEVIE JACKSON returns for scheduled follow-up appointment. Ms. JACKSON reports that in the interval since her last visit, she is doing well. She reduced divalproex ER to 250 qHS and has had no interval events. However, she feels that her behavior is subtly different, she is less inhibited when confronted with problem, her headaches are slightly worse, she has somewhat more difficult concentrating. Ms. JACKSON attributes these changes to her reduction of divalproex. Ms. JACKSON also elaborated on her chronic pain, which she describes as burning dysesthesias in the left leg.  She recently had venous procedure (?stripping) in both legs by vascular surgeon that she feels improved pain slightly.  The burning sensation is constant, limits her ability to walk distances (which increases pain).\par \par *** 03/12/2019 ***\par Ms. JACKSON returns for follow-up. She has not had any interval seizures. She is taking a very small dose of divalproex  daily, and gabapentin 400mg three times daily. Her history of seizures - documented below - is unclear. She denies any side effects. She is working as an . \par \par *** 04/12/2017 ***\par Ms. Jackson is a 52-year-old right-handed woman who developed intracerebral hemorrhage in December 2016 affecting the right putamen and subinsular white matter. Past medical history significant for hypertension. She has been seen by Dr. Castellon of the stroke service, and Dr. Livingston of the neuromuscular service. Her initial symptoms of ICH consisted of severe headache with some left-sided numbness. After she was diagnosed with intracerebral hemorrhage, she complained of painful paresthesias and cramping in the left leg that are present nearly every day, and fluctuate in intensity throughout the day. She had previously been on atorvastatin, which was discontinued out of concern for her cramping. She also complains of "pulling" in her left sided the neck and face. These symptoms also are present intermittently throughout the day, and never entirely go away. She started taking gabapentin for some of the painful paresthesias in the leg, and reports some benefit. She does not take it everyday.\par \par On February 6, she experienced a single episode of painful paresthesias in the jaw. Thereafter, she was prescribed Keppra 500 mg twice a day. She felt very tired on Keppra, and the dose was reduced to 500 mg at bedtime. She has not had recurrence of the episodes. She ran out of Keppra 2 nights ago, and did not take her dose last night. She continues to feel tired, and has not had the episodes recur thus far.\par \par She is accompanied by her son. She and her son both deny any episodes of altered awareness, lip smacking, staring, facial twitching, or other signs of convulsion. She denies incontinence or tongue biting.\par \par Patient endorses difficulty concentrating, anxiety, mild intermittent headaches, intermittent dizziness, numbness and tingling as described, worsened balance.

## 2019-06-03 NOTE — ASSESSMENT
[FreeTextEntry1] : Ms. JACKSON had an ICH event and associated with that had a number of focal neurological events including painful burning paresthesias continuously, numbness in left foot, and episodes of face tightness and pulling that have not recurred since initial presentation. It is not clear that she needs to be on seizure medication. In hindsight it seems lilkely that divalproex was providing a mood stabilizing benefit. However, she is motivated to dc divalproex because of weight gain.  I believe her chronic L LE pain is due to location of hemorrhage and residual hemosiderin beneath pain representation of R posterior insula.  Ms. JACKSON also endorses other symptoms - difficulty understanding first syllable of words or sentences, difficulty concentrating. Amb EEG is normal.\par \par Plan\par 1. taper off oxcarbazepine - Ms. JACKSON was given schedule.\par 2. start topiramate - increase in weekly increment to 50mg q12 - Ms. JACKSON was given schedule. \par 3. RTC 4 wks.\par \par Greater than 50% of the encounter time was spent on counseling and coordination of care for reviewing records in [employer], discussion with patient regarding plan. I have spent 25 minutes of face to face time with the patient reviewing the cause of seizures or seizure-like events, assessing the risk of recurrence, educating the patient or family to recognize seizures, and discussing possible treatment options and possible side effects of seizure medications. I also discussed seizure safety, and ways of reducing seizure risk.

## 2019-06-20 ENCOUNTER — APPOINTMENT (OUTPATIENT)
Dept: PAIN MANAGEMENT | Facility: CLINIC | Age: 54
End: 2019-06-20

## 2019-07-03 ENCOUNTER — APPOINTMENT (OUTPATIENT)
Dept: NEUROLOGY | Facility: CLINIC | Age: 54
End: 2019-07-03
Payer: COMMERCIAL

## 2019-07-03 VITALS
HEART RATE: 61 BPM | WEIGHT: 172 LBS | HEIGHT: 65 IN | DIASTOLIC BLOOD PRESSURE: 86 MMHG | BODY MASS INDEX: 28.66 KG/M2 | SYSTOLIC BLOOD PRESSURE: 136 MMHG

## 2019-07-03 PROCEDURE — 99214 OFFICE O/P EST MOD 30 MIN: CPT

## 2019-07-06 RX ORDER — OXCARBAZEPINE 150 MG/1
150 TABLET, FILM COATED ORAL
Qty: 120 | Refills: 5 | Status: DISCONTINUED | COMMUNITY
Start: 2019-05-02 | End: 2019-07-06

## 2019-07-06 NOTE — ASSESSMENT
[FreeTextEntry1] : Ms. JACKSON had an ICH event and associated with that had a number of focal neurological events including painful burning paresthesias continuously, numbness in left foot, and episodes of face tightness and pulling that have not recurred since initial presentation. It seems likely that she does not need to take seizure medication. In hindsight it seems lilkely that divalproex was providing a mood stabilizing benefit. However, she was motivated to dc divalproex because of weight gain. I believe her chronic L LE pain is due to location of hemorrhage and residual hemosiderin beneath pain representation of R posterior insula. Ms. JACKSON also endorses other symptoms - difficulty understanding first syllable of words or sentences, difficulty concentrating. Amb EEG is normal.\par \par Plan\par 1. continue topiramate 50mg q12\par 2. re-start divalproex ER 250mg q12 - Ms. JACKSON may reduce dose if she is concerned about weight gain. \par 3. RTC 2 mo ( Ms. JACKSON will be travelling to [facility] for the month). \par \par Greater than 50% of the encounter time was spent on counseling and coordination of care for reviewing records in [employer], discussion with patient regarding plan. I have spent 25 minutes of face to face time with the patient reviewing the cause of seizures or seizure-like events, assessing the risk of recurrence, educating the patient or family to recognize seizures, and discussing possible treatment options and possible side effects of seizure medications. I also discussed seizure safety, and ways of reducing seizure risk.

## 2019-07-06 NOTE — HISTORY OF PRESENT ILLNESS
[FreeTextEntry1] : *** 07/03/2019  ***\par  Ms. JACKSON reports that weight is dropping, constipation better, HA ok, present every day, but not as bothersome.  However, Ms. JACKSON notes that she experiences a sense of confusion and sense of stress were better with divalproex. She did not see any improvement on either oxcarbazepine or topiramate. \par \par *** 04/22/2019  ***\par Ms. STEVIE JACKSON returns for scheduled follow-up appointment. Ms. JACKSON reports that in the interval since her last visit, she is doing well. She reduced divalproex ER to 250 qHS and has had no interval events. However, she feels that her behavior is subtly different, she is less inhibited when confronted with problem, her headaches are slightly worse, she has somewhat more difficult concentrating. Ms. JACKSON attributes these changes to her reduction of divalproex. Ms. JACKSON also elaborated on her chronic pain, which she describes as burning dysesthesias in the left leg.  She recently had venous procedure (?stripping) in both legs by vascular surgeon that she feels improved pain slightly.  The burning sensation is constant, limits her ability to walk distances (which increases pain).\par \par *** 03/12/2019 ***\par Ms. JACKSON returns for follow-up. She has not had any interval seizures. She is taking a very small dose of divalproex  daily, and gabapentin 400mg three times daily. Her history of seizures - documented below - is unclear. She denies any side effects. She is working as an . \par \par *** 04/12/2017 ***\par Ms. Jackson is a 52-year-old right-handed woman who developed intracerebral hemorrhage in December 2016 affecting the right putamen and subinsular white matter. Past medical history significant for hypertension. She has been seen by Dr. Castellon of the stroke service, and Dr. Livingston of the neuromuscular service. Her initial symptoms of ICH consisted of severe headache with some left-sided numbness. After she was diagnosed with intracerebral hemorrhage, she complained of painful paresthesias and cramping in the left leg that are present nearly every day, and fluctuate in intensity throughout the day. She had previously been on atorvastatin, which was discontinued out of concern for her cramping. She also complains of "pulling" in her left sided the neck and face. These symptoms also are present intermittently throughout the day, and never entirely go away. She started taking gabapentin for some of the painful paresthesias in the leg, and reports some benefit. She does not take it everyday.\par \par On February 6, she experienced a single episode of painful paresthesias in the jaw. Thereafter, she was prescribed Keppra 500 mg twice a day. She felt very tired on Keppra, and the dose was reduced to 500 mg at bedtime. She has not had recurrence of the episodes. She ran out of Keppra 2 nights ago, and did not take her dose last night. She continues to feel tired, and has not had the episodes recur thus far.\par \par She is accompanied by her son. She and her son both deny any episodes of altered awareness, lip smacking, staring, facial twitching, or other signs of convulsion. She denies incontinence or tongue biting.\par \par Patient endorses difficulty concentrating, anxiety, mild intermittent headaches, intermittent dizziness, numbness and tingling as described, worsened balance.

## 2019-09-11 ENCOUNTER — APPOINTMENT (OUTPATIENT)
Dept: NEUROLOGY | Facility: CLINIC | Age: 54
End: 2019-09-11
Payer: COMMERCIAL

## 2019-09-11 VITALS
HEIGHT: 63 IN | DIASTOLIC BLOOD PRESSURE: 78 MMHG | BODY MASS INDEX: 29.41 KG/M2 | HEART RATE: 57 BPM | WEIGHT: 166 LBS | SYSTOLIC BLOOD PRESSURE: 119 MMHG

## 2019-09-11 PROCEDURE — 99214 OFFICE O/P EST MOD 30 MIN: CPT

## 2019-09-12 ENCOUNTER — APPOINTMENT (OUTPATIENT)
Dept: NEUROLOGY | Facility: CLINIC | Age: 54
End: 2019-09-12

## 2019-09-16 NOTE — PHYSICAL EXAM
[FreeTextEntry1] : alert and oriented x 3, speech fluent, names easily, follows requests, good recall for recent and remote events.\par EOM full without sustained nystagmus, PERRL, face symmetrical, no dysarthria\par Motor - full strength in all extremities. normal rapid-alternating movements.\par Sensory - intact LT bilaterally, but there is decreased PP on left below knee\par Reflexes - decreased in LE\par Coord - no tremor, ataxia\par Gait - stands without difficulty, gait favors left. \par \par Straight leg raise negative on right, positive at 20 degrees on left.

## 2019-09-16 NOTE — HISTORY OF PRESENT ILLNESS
[FreeTextEntry1] : *** 09/11/2019  ***\par Ms. JACKSON reports that mood continues poor and pain is a significant problem. She did not re-start divalproex after last visit. She initially lost weight after stopping divalproex and starting low dose topiramate, but she gained that weight back despite continuing on topiramate. She remains on low dose topiramate but HA continue to be a problem. Ms. JACKSON continues to notice that her mood is more labile since stopping divalproex.  She recently travelled to [facility] and -among other activities - went to \[facility] [place] [facility]\"", but did not enjoy the trip or feel improved. She is ambivalent about restarting divalproex. \par Ms. JACKSON notes that she has burning pain on L side, to some degree she has pain below the knees bilaterally, but worse left. Pain above the knee is largely limited to L.  She is contemplating a venous stripping procedure bilaterally which she has been told will improve her pain. \par \par *** 07/03/2019  ***\par  Ms. JACKSON reports that weight is dropping, constipation better, HA ok, present every day, but not as bothersome.  However, Ms. JACKSON notes that she experiences a sense of confusion and sense of stress were better with divalproex. She did not see any improvement on either oxcarbazepine or topiramate. \par \par *** 04/22/2019  ***\par Ms. STEVIE JACKSON returns for scheduled follow-up appointment. Ms. JACKSON reports that in the interval since her last visit, she is doing well. She reduced divalproex ER to 250 qHS and has had no interval events. However, she feels that her behavior is subtly different, she is less inhibited when confronted with problem, her headaches are slightly worse, she has somewhat more difficult concentrating. Ms. JACKSON attributes these changes to her reduction of divalproex. Ms. JACKSON also elaborated on her chronic pain, which she describes as burning dysesthesias in the left leg.  She recently had venous procedure (?stripping) in both legs by vascular surgeon that she feels improved pain slightly.  The burning sensation is constant, limits her ability to walk distances (which increases pain).\par \par *** 03/12/2019 ***\par Ms. JACKSON returns for follow-up. She has not had any interval seizures. She is taking a very small dose of divalproex  daily, and gabapentin 400mg three times daily. Her history of seizures - documented below - is unclear. She denies any side effects. She is working as an . \par \par *** 04/12/2017 ***\par Ms. Jackson is a 52-year-old right-handed woman who developed intracerebral hemorrhage in December 2016 affecting the right putamen and subinsular white matter. Past medical history significant for hypertension. She has been seen by Dr. Castellon of the stroke service, and Dr. Livingston of the neuromuscular service. Her initial symptoms of ICH consisted of severe headache with some left-sided numbness. After she was diagnosed with intracerebral hemorrhage, she complained of painful paresthesias and cramping in the left leg that are present nearly every day, and fluctuate in intensity throughout the day. She had previously been on atorvastatin, which was discontinued out of concern for her cramping. She also complains of "pulling" in her left sided the neck and face. These symptoms also are present intermittently throughout the day, and never entirely go away. She started taking gabapentin for some of the painful paresthesias in the leg, and reports some benefit. She does not take it everyday.\par \par On February 6, she experienced a single episode of painful paresthesias in the jaw. Thereafter, she was prescribed Keppra 500 mg twice a day. She felt very tired on Keppra, and the dose was reduced to 500 mg at bedtime. She has not had recurrence of the episodes. She ran out of Keppra 2 nights ago, and did not take her dose last night. She continues to feel tired, and has not had the episodes recur thus far.\par \par She is accompanied by her son. She and her son both deny any episodes of altered awareness, lip smacking, staring, facial twitching, or other signs of convulsion. She denies incontinence or tongue biting.\par \par Patient endorses difficulty concentrating, anxiety, mild intermittent headaches, intermittent dizziness, numbness and tingling as described, worsened balance.

## 2019-09-16 NOTE — HISTORY OF PRESENT ILLNESS
[FreeTextEntry1] : *** 09/11/2019  ***\par Ms. JACKSON reports that mood continues poor and pain is a significant problem. She did not re-start divalproex after last visit. She initially lost weight after stopping divalproex and starting low dose topiramate, but she gained that weight back despite continuing on topiramate. She remains on low dose topiramate but HA continue to be a problem. Ms. JACKSON continues to notice that her mood is more labile since stopping divalproex.  She recently travelled to [facility] and -among other activities - went to [facility], but did not enjoy the trip or feel improved. She is ambivalent about restarting divalproex. \par Ms. JACKSON notes that she has burning pain on L side, to some degree she has pain below the knees bilaterally, but worse left. Pain above the knee is largely limited to L.  She is contemplating a venous stripping procedure bilaterally which she has been told will improve her pain. \par \par *** 07/03/2019  ***\par  Ms. JACKSON reports that weight is dropping, constipation better, HA ok, present every day, but not as bothersome.  However, Ms. JACKSON notes that she experiences a sense of confusion and sense of stress were better with divalproex. She did not see any improvement on either oxcarbazepine or topiramate. \par \par *** 04/22/2019  ***\par Ms. STEVIE JACKSON returns for scheduled follow-up appointment. Ms. JACKSON reports that in the interval since her last visit, she is doing well. She reduced divalproex ER to 250 qHS and has had no interval events. However, she feels that her behavior is subtly different, she is less inhibited when confronted with problem, her headaches are slightly worse, she has somewhat more difficult concentrating. Ms. JACKSON attributes these changes to her reduction of divalproex. Ms. JACKSON also elaborated on her chronic pain, which she describes as burning dysesthesias in the left leg.  She recently had venous procedure (?stripping) in both legs by vascular surgeon that she feels improved pain slightly.  The burning sensation is constant, limits her ability to walk distances (which increases pain).\par \par *** 03/12/2019 ***\par Ms. JACKSON returns for follow-up. She has not had any interval seizures. She is taking a very small dose of divalproex  daily, and gabapentin 400mg three times daily. Her history of seizures - documented below - is unclear. She denies any side effects. She is working as an . \par \par *** 04/12/2017 ***\par Ms. Jackson is a 52-year-old right-handed woman who developed intracerebral hemorrhage in December 2016 affecting the right putamen and subinsular white matter. Past medical history significant for hypertension. She has been seen by Dr. Castellon of the stroke service, and Dr. Livingston of the neuromuscular service. Her initial symptoms of ICH consisted of severe headache with some left-sided numbness. After she was diagnosed with intracerebral hemorrhage, she complained of painful paresthesias and cramping in the left leg that are present nearly every day, and fluctuate in intensity throughout the day. She had previously been on atorvastatin, which was discontinued out of concern for her cramping. She also complains of "pulling" in her left sided the neck and face. These symptoms also are present intermittently throughout the day, and never entirely go away. She started taking gabapentin for some of the painful paresthesias in the leg, and reports some benefit. She does not take it everyday.\par \par On February 6, she experienced a single episode of painful paresthesias in the jaw. Thereafter, she was prescribed Keppra 500 mg twice a day. She felt very tired on Keppra, and the dose was reduced to 500 mg at bedtime. She has not had recurrence of the episodes. She ran out of Keppra 2 nights ago, and did not take her dose last night. She continues to feel tired, and has not had the episodes recur thus far.\par \par She is accompanied by her son. She and her son both deny any episodes of altered awareness, lip smacking, staring, facial twitching, or other signs of convulsion. She denies incontinence or tongue biting.\par \par Patient endorses difficulty concentrating, anxiety, mild intermittent headaches, intermittent dizziness, numbness and tingling as described, worsened balance.

## 2019-09-16 NOTE — ASSESSMENT
[FreeTextEntry1] : Ms. JACKSON had an ICH event and associated with that had a number of focal neurological events including painful burning paresthesias continuously, numbness in left foot.  She also had episodes of face tightness and pulling that have not recurred since initial presentation. It seems likely that she does not need to take seizure medication. In hindsight it seems likely that divalproex was providing a mood stabilizing benefit. However, she was motivated to dc divalproex because of weight gain. I believe her chronic L LE pain is due to location of hemorrhage and residual hemosiderin beneath pain representation of R posterior insula. Ms. JACKSON also endorses other symptoms - difficulty understanding first syllable of words or sentences, difficulty concentrating. Amb EEG was normal. However, also has evidence of local lumbosacral radicular signs. \par \par Plan\par 1. r/o radicular component to L LE pain. 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 \par 2. continue topiramate 50mg q12\par 3. re-start divalproex ER 250mg q12,\par 4. RTC 3 mo \par 5. we reviewed side effects of divalproex \par \par I have spent 40 minutes of face to face time with the patient reviewing the cause of burning pain, mood changes, assessing the risk of recurrence, educating the patient or family on causes of pain, and discussing possible treatment options and possible side effects of seizure medications. I also discussed seizure safety, and ways of reducing seizure risk. Greater than 50% of the encounter time was spent on counseling and coordination of care for reviewing records in [employer], discussion with patient regarding plan.

## 2019-09-25 ENCOUNTER — FORM ENCOUNTER (OUTPATIENT)
Age: 54
End: 2019-09-25

## 2019-09-26 ENCOUNTER — OUTPATIENT (OUTPATIENT)
Dept: OUTPATIENT SERVICES | Facility: HOSPITAL | Age: 54
LOS: 1 days | End: 2019-09-26
Payer: COMMERCIAL

## 2019-09-26 ENCOUNTER — APPOINTMENT (OUTPATIENT)
Dept: MRI IMAGING | Facility: CLINIC | Age: 54
End: 2019-09-26
Payer: COMMERCIAL

## 2019-09-26 DIAGNOSIS — Z00.8 ENCOUNTER FOR OTHER GENERAL EXAMINATION: ICD-10-CM

## 2019-09-26 DIAGNOSIS — H26.40 UNSPECIFIED SECONDARY CATARACT: Chronic | ICD-10-CM

## 2019-09-26 PROCEDURE — 72148 MRI LUMBAR SPINE W/O DYE: CPT

## 2019-09-26 PROCEDURE — 72148 MRI LUMBAR SPINE W/O DYE: CPT | Mod: 26

## 2019-11-06 NOTE — ED ADULT NURSE NOTE - NSSISCREENINGQ2_ED_A_ED
"Patient is a direct admit from [facility]. She reports having \"dark thoughts\" her whole life. She reports being suicidal with no plan at this time. She states she attempted suicide on her way to [facility] by choking herself 3 days ago. Patient states she has had to break up with her boyfriend because [facility] told her she was not allowed to see him. Patient says she is only permitted to speak to her mother on the phone 1x per week and cannot see her. Patient reports this has really stressed her out because her mother is her main support. Patient states, \"I can't get them to understand the amount of changes in my life is too many! I feel so overwhelmed. I am losing every source of hope and light.\" Patient reports feeling every source of happiness has been ripped away since being at [facility]. Patient states, \"I don't see a reason to wake up. I don't want to be here anymore. I've lost it all.\" She reports using Meth a gram a week (smoking or snorting) with her last use being 3 days ago.   " No

## 2019-11-29 ENCOUNTER — RX RENEWAL (OUTPATIENT)
Age: 54
End: 2019-11-29

## 2019-12-18 ENCOUNTER — APPOINTMENT (OUTPATIENT)
Dept: NEUROLOGY | Facility: CLINIC | Age: 54
End: 2019-12-18

## 2019-12-20 ENCOUNTER — APPOINTMENT (OUTPATIENT)
Dept: NEUROLOGY | Facility: CLINIC | Age: 54
End: 2019-12-20
Payer: COMMERCIAL

## 2019-12-20 VITALS
HEIGHT: 63 IN | SYSTOLIC BLOOD PRESSURE: 143 MMHG | DIASTOLIC BLOOD PRESSURE: 87 MMHG | HEART RATE: 60 BPM | BODY MASS INDEX: 30.83 KG/M2 | WEIGHT: 174 LBS

## 2019-12-20 PROCEDURE — 99214 OFFICE O/P EST MOD 30 MIN: CPT

## 2019-12-20 RX ORDER — TOPIRAMATE 50 MG/1
50 TABLET, FILM COATED ORAL TWICE DAILY
Qty: 60 | Refills: 5 | Status: DISCONTINUED | COMMUNITY
Start: 2019-06-03 | End: 2019-12-20

## 2019-12-20 NOTE — ASSESSMENT
[FreeTextEntry1] : Ms. JACKSON had an ICH event and associated with that had a number of focal neurological events including painful burning paresthesias continuously, numbness in left foot.  She also had episodes of face tightness and pulling that have not recurred since initial presentation. It seems likely that she does not need to take seizure medication. In hindsight it seems likely that divalproex was providing a mood stabilizing benefit. However, she was motivated to dc divalproex because of weight gain. I believe her chronic L LE dysesthetic burning pain is due to location of hemorrhage and residual hemosiderin beneath pain representation of R posterior insula or interruption of thalamocortical pain fibers. Ms. JACKSON also endorses other symptoms - difficulty understanding first syllable of words or sentences, difficulty concentrating since hemorrhage. Amb EEG was normal. However, also has evidence of local lumbosacral radicular signs of sciatic now improved with conservative management. MRI LS Spine showed mild multilevel stenosis.  \par \par Plan\par 1. discontinue topiramate 50mg q12\par 2. continue divalproex ER 250mg q12\par 3. f/u with dermatologist regarding acne - consider change in divalproex to amitriptyline if skin problem persists. \par 4. RTC 2 mo \par 5. we reviewed side effects of divalproex, amitriptyline\par \par I have spent 25 minutes of face to face time with the patient reviewing the cause of burning pain, mood changes, assessing the risk of recurrence, educating the patient or family on causes of pain, and discussing possible treatment options and possible side effects of seizure medications. I also discussed seizure safety, and ways of reducing seizure risk. Greater than 50% of the encounter time was spent on counseling and coordination of care for reviewing records in [employer], discussion with patient regarding plan.

## 2019-12-20 NOTE — HISTORY OF PRESENT ILLNESS
[FreeTextEntry1] : *** 12/20/2019  ***\par Ms. JACKSON returns for scheduled follow-up. She is taking divalproex and feels that she is less irritable since dc'ing topiramate and starting divalproex.  She has noted increased acne since starting divalproex  Her sciatica is better, but she still gets burning dysesthetic pain in the left foot, not better on divalproex but partly relieved by gabapentin. Ms. 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  Dr. Baker recommended amitriptyline, which has not yet been tried. \par \par *** 09/11/2019  ***\par Ms. JACKSON reports that mood continues poor and pain is a significant problem. She did not re-start divalproex after last visit. She initially lost weight after stopping divalproex and starting low dose topiramate, but she gained that weight back despite continuing on topiramate. She remains on low dose topiramate but HA continue to be a problem. Ms. JACKSON continues to notice that her mood is more labile since stopping divalproex.  She recently travelled to [facility] and -among other activities - went to [facility], but did not enjoy the trip or feel improved. She is ambivalent about restarting divalproex. \par Ms. JACKSON notes that she has burning pain on L side, to some degree she has pain below the knees bilaterally, but worse left. Pain above the knee is largely limited to L.  She is contemplating a venous stripping procedure bilaterally which she has been told will improve her pain. \par \par *** 07/03/2019  ***\par  Ms. JACKSON reports that weight is dropping, constipation better, HA ok, present every day, but not as bothersome.  However, Ms. JACKSON notes that she experiences a sense of confusion and sense of stress were better with divalproex. She did not see any improvement on either oxcarbazepine or topiramate. \par \par *** 04/22/2019  ***\par Ms. STEVIE JACKSON returns for scheduled follow-up appointment. Ms. JACKSON reports that in the interval since her last visit, she is doing well. She reduced divalproex ER to 250 qHS and has had no interval events. However, she feels that her behavior is subtly different, she is less inhibited when confronted with problem, her headaches are slightly worse, she has somewhat more difficult concentrating. Ms. JACKSON attributes these changes to her reduction of divalproex. Ms. JACKSON also elaborated on her chronic pain, which she describes as burning dysesthesias in the left leg.  She recently had venous procedure (?stripping) in both legs by vascular surgeon that she feels improved pain slightly.  The burning sensation is constant, limits her ability to walk distances (which increases pain).\par \par *** 03/12/2019 ***\par Ms. JACKSON returns for follow-up. She has not had any interval seizures. She is taking a very small dose of divalproex  daily, and gabapentin 400mg three times daily. Her history of seizures - documented below - is unclear. She denies any side effects. She is working as an . \par \par *** 04/12/2017 ***\par Ms. Jackson is a 52-year-old right-handed woman who developed intracerebral hemorrhage in December 2016 affecting the right putamen and subinsular white matter. Past medical history significant for hypertension. She has been seen by Dr. Castellon of the stroke service, and Dr. Livingston of the neuromuscular service. Her initial symptoms of ICH consisted of severe headache with some left-sided numbness. After she was diagnosed with intracerebral hemorrhage, she complained of painful paresthesias and cramping in the left leg that are present nearly every day, and fluctuate in intensity throughout the day. She had previously been on atorvastatin, which was discontinued out of concern for her cramping. She also complains of "pulling" in her left sided the neck and face. These symptoms also are present intermittently throughout the day, and never entirely go away. She started taking gabapentin for some of the painful paresthesias in the leg, and reports some benefit. She does not take it everyday.\par \par On February 6, she experienced a single episode of painful paresthesias in the jaw. Thereafter, she was prescribed Keppra 500 mg twice a day. She felt very tired on Keppra, and the dose was reduced to 500 mg at bedtime. She has not had recurrence of the episodes. She ran out of Keppra 2 nights ago, and did not take her dose last night. She continues to feel tired, and has not had the episodes recur thus far.\par \par She is accompanied by her son. She and her son both deny any episodes of altered awareness, lip smacking, staring, facial twitching, or other signs of convulsion. She denies incontinence or tongue biting.\par \par Patient endorses difficulty concentrating, anxiety, mild intermittent headaches, intermittent dizziness, numbness and tingling as described, worsened balance.

## 2020-02-26 ENCOUNTER — RX RENEWAL (OUTPATIENT)
Age: 55
End: 2020-02-26

## 2020-03-04 ENCOUNTER — APPOINTMENT (OUTPATIENT)
Dept: NEUROLOGY | Facility: CLINIC | Age: 55
End: 2020-03-04
Payer: COMMERCIAL

## 2020-03-04 VITALS
BODY MASS INDEX: 31.54 KG/M2 | HEIGHT: 63 IN | WEIGHT: 178 LBS | HEART RATE: 65 BPM | SYSTOLIC BLOOD PRESSURE: 128 MMHG | DIASTOLIC BLOOD PRESSURE: 81 MMHG

## 2020-03-04 PROCEDURE — 99214 OFFICE O/P EST MOD 30 MIN: CPT

## 2020-04-06 ENCOUNTER — TRANSCRIPTION ENCOUNTER (OUTPATIENT)
Age: 55
End: 2020-04-06

## 2020-04-07 ENCOUNTER — TRANSCRIPTION ENCOUNTER (OUTPATIENT)
Age: 55
End: 2020-04-07

## 2020-04-07 DIAGNOSIS — R51 HEADACHE: ICD-10-CM

## 2020-04-11 ENCOUNTER — RECORD ABSTRACTING (OUTPATIENT)
Age: 55
End: 2020-04-11

## 2020-04-11 NOTE — PHYSICAL EXAM
[FreeTextEntry1] : General exam: Sitting in the chair and does not appear to be in distress\par Carotid bruits: None\par CVS: S1-S2 present\par RS: CTA B\par Skin: No lesion noted on visible skin \par \par Neuro exam: \par MS: Alert, awake and is oriented to time, place and person with normal attention span, normal recent and remote memory\par Language: Fluent speech with intact comprehension, with intact naming and repetition, no right-left confusion, no finger agnosia and no apraxia. Normal fund of knowledge.\par Cr.N.: Pupils bilaterally 3-4 mm in size, equal, round and reactive to light, no papilledema, intact visual fields to confrontation, extraocular movements intact without any diplopia, no ptosis, no nystagmus, face appears to be symmetric with intact facial sensations, no hearing loss to rubbing fingers, tongue is in the midline, uvula elevates in the midline and without any drooping of the soft palate, normal shrugging of the shoulders bilaterally.\par \par Motor: \par Tone - Normal\par Bulk - No atrophy\par Power - Subtle LUE pronator drift\par RUE and RLE - 5/5 \par LUE - 5/5\par LLE - 5-/5 proximally and distally \par DTRs - +2 all over and slightly brisker on the L side \par Plantars: Bilaterally flexor \par Sensory: Decreased light touch and pinprick over L face but was intact to arm and leg, no sensory extinction \par Cerebellar: No ataxia/dysmetria on finger-nose-finger and knee-heel-shin testing, as well as without any dysdiadochokinesia\par Gait: Normal casual gait with normal stride and length and was able to perform toe, heel and tandem walking \par Romberg's sign - Absent.

## 2020-04-11 NOTE — ASSESSMENT
[FreeTextEntry1] : Assessment:\par 52 Y/O R handed woman with vascular risk factors of HTN, HPLD and age is seen in the vascular neurology office for the evaluation and management of ICH leading to hospital admission in 12/2016. She was admitted to the hospital for evaluation of headache and left-sided sensory paresthesia. CT brain on admission and subsequent MRI brain showed ICH in the right lentiform nucleus region with surrounding vasogenic edema. CTA head and neck did not show any evidence of vascular malformation being the etiology of ICH and also did not show any hemodynamically significant vessel stenosis or occlusion. Repeat MRI brain/MRA head in 7/17 showed expected evolution and almost resolution of right basal ganglia ICH, no abnormal contrast enhancement is noted in the region, no evidence of vascular malformation being the etiology of her ICH. Of note, she also developed post-ICH/central pain syndrome subsequently. Since her ICH, she also reports to have noticed hearing disturbance - described as hyperacusis without any associated hearing loss or tinnitus. She has been evaluated by Dr. Santos, Dr. Baker and Dr. Cali for the same. \par \par Impression:\par 1. Nontraumatic right hemispheric subcortically located intracerebral hemorrhage. Right posterior putaminal ICH - likely etiology being hypertensive ICH \par 2. Post-ICH/central pain syndrome \par \par Plan:\par 1. ICH: \par - Currently no indications to be on antiplatelet therapy from primary ischemic stroke prevention standpoint. No absolute neurovascular contraindication to start aspirin if indicated from medical standpoint\par - Advised to consult with her PCP/cardiologist regarding medical indications to be on statin medication due to her history of hyperlipidemia . Based on medical indications/ASCVD risk profile\par - She should follow up closely with her primary care physician for optimal vascular risk factors modifications including blood pressure goal less than 130/80 mmHg, HbA1c goal <7 and preferably 6-6.5 and optimal cholesterol management \par - She is advised to check blood pressure regularly at home, preferably at different times during the day and multiple days a week and was advised to keep a log of BPs to bring to primary care physician visit for further instructions regarding optimal BP management. Also advised to followup closely with PCP regarding regular blood work including monitoring of HbA1c and cholesterol profile- Would obtain the results of Transthoracic echocardiogram to look for evidence of LVH, suggestive of long-standing hypertension\par - Repeat MRI brain with and without contrast, MRA head and neck without contrast to determine complete resolution/resorption of the ICH and to rule out any underlying pathology; renal functions to be checked before administration of MR contrast\par - Referral to a sleep disorder specialist to evaluate for possible obstructive sleep apnea as a vascular risk factor\par \par 2. Central pain syndrome:\par - Risks versus benefits, and adverse reactions the complications associated with neuropathy pain medication use for management of her central pain syndrome were discussed with patient and her  in detail. She is currently on gabapentin and reports to have noticed some improvement in painful sensory paresthesia, but denies significant improvement. Decision regarding optimal management of central pain syndrome including neuropathic pain medication management is deferred to Dr. Casillas \par \par 3. Possible simple partial/sensory seizures:\par - Further evaluation and management as per Dr. Mcclain \par - Continue with safety precautions \par \par - Above mentioned plan was discussed with patient and available family members in detail. I have answered all their questions to the best of my abilities. I have reviewed measures for secondary stroke prevention with the patient and available family members, including aggressive vascular risk factors modification, healthy diet, regular exercise and medication compliance. As well as discussed the need for calling 911 immediately in case of any symptoms concerning for stroke in the future. \par - I would follow her in the office in about 6 months or earlier as needed. Also advised to contact me upon completion of imaging studies and/or laboratory testing/investigations to discuss the results over the phone.

## 2020-04-11 NOTE — HISTORY OF PRESENT ILLNESS
[FreeTextEntry1] : 54 Y/O R handed woman with vascular risk factors of HTN, HPLD and age is seen in the vascular neurology office for the evaluation and management of ICH leading to hospital admission in 12/2016.\par \par On 12/13/16, she noticed severe HA and associated neck pain. She did not seek medical attention immediately. She noticed some improvement in the HAs with Tylenol but continued to have fatigue. She was seen by PCP as she noticed L sided numbness. She was given ABx by her PCP for possible sinus infection. On 12/25/16, she noticed some worsening of her symptoms and she was brought to [facility] for evaluation. She was found to have ICH on admission to the hospital. She was discharged to rehab after ICH work up. She denies taking antiplatelets or anticoagulants before her admission to the hospital. She reports to have history of HTN since last few years and has been taking anti-HTNs for about 2 years. She has been discharged from the rehab and has been at home since 1/2017. Of note, she was evaluated by Dr. Livingston in 2/17 and she was started on levetiracetam 500 mg BID for episodes concerning for seizures (spreading sensory paresthesia concerning for sensory seizures). She was not able to tolerate levetiracetam due to side effects in the form of excessive sleepiness and was it was subsequently discontinued. She was also evaluated by Dr. Mcclain for episodes of spreading sensory paresthesia concerning for seizures. She is currently not on any AEDs. In 2/18, she had severe headaches and she was referred to [facility] ED for evaluation. She underwent LP to rule out CNS infection. She developed worsening of the HAs after the LP, presumed to be due to low pressure HAs. She was treated with IV Caffeine and discharged on  mg BID. She reports to try to taper herself off of the VPA but developed rebound HAs. She reports to have continued VPA since then. 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 \par \par She reports to have noticed some improvement in her left-sided painful sensory paresthesia, but her hearing disturbance is reported to be the same since last office is at. Since her last office visit, she denies any episodes of new or recurrent focal neurological symptoms concerning for stroke/TIA or ICH nor reports any episodes concerning for seizure-like activity. Since her last office visit, she denies any episodes of focal neurological symptoms concerning for stroke/TIA or ICH. Her current post-ICH mRS is reported to be 2 as she has not started driving yet but has been able to return back to work and reports independent of her ADLs. She denies any episodes concerning for seizures since her last office visit and she is being followed by Dr. Mcclain for the same. She reports compliance with her medications and denies any significant side effects. Since her last office visit. She was evaluated by an being followed by Dr. Casillas for central pain syndrome.\par \par ROS: All negative except documented in the history of present illness.\par \par ICH workup:\par CT brain (12/25/16): Right posterior putaminal hemorrhage with surrounding vasogenic edema\par CTA head and neck (12/25/16): No evidence of permission being the etiology of her ICH and no evidence of hemodynamically significant intracranial or extracranial large vessel severe stenosis or occlusion\par MRI brain (12/27/16): Redemonstration of posterior putaminal intracerebral hemorrhage with surrounding vasogenic edema, mild leukoaraiosis and no evidence of abnormal contrast enhancement, no evidence of prior microhemorrhages\par LDL: 150 (12/16) -> 45 (1/17) \par HbA1c: 6.1 (12/16) \par MRI brain (2/6/17): Expected evolution of right basal ganglia ICH without any evidence of acute stroke\par MRI brain/MRA head (7/17): Expected evolution and almost resolution of right basal ganglia ICH, no abnormal contrast enhancement is noted in the region, no evidence of vascular malformation in the etiology of her ICH\par \par Home medications:\par Reviewed with the patient and updated as appropriate.

## 2020-04-15 ENCOUNTER — APPOINTMENT (OUTPATIENT)
Dept: NEUROLOGY | Facility: CLINIC | Age: 55
End: 2020-04-15
Payer: COMMERCIAL

## 2020-04-15 PROCEDURE — 99214 OFFICE O/P EST MOD 30 MIN: CPT | Mod: 95

## 2020-04-16 NOTE — PHYSICAL EXAM
[FreeTextEntry1] : alert and oriented x 3, speech fluent, names easily, follows requests, good recall for recent and remote events.\par EOM full without sustained nystagmus, PERRL, face symmetrical, no dysarthria\par Motor - normal motion in upper extremities bilaterally. normal rapid-alternating movements.\par Sensory - not tested\par Coord - no tremor, ataxia\par Gait - stands without difficulty

## 2020-04-16 NOTE — ASSESSMENT
[FreeTextEntry1] : Ms. JACKSON has a history of ICH  and associated with that had a number of focal neurological events including painful burning paresthesias continuously, numbness in left foot that interferes with ambulation. Reports that she continues to be irritable. Endorses having sensitivity to touch on the left face and having abdominal pain on the left. \par Most likely chronic L LE dysesthetic burning pain is due to location of hemorrhage and residual hemosiderin beneath pain representation of R posterior insula or interruption of thalamocortical pain fibers.  \par \par Plan\par 1. Trial of increased amitriptyline 50 mg at bedtime, if side effects of dry mouth and daytime sleepiness worse without significant improvmenet in pain after 1-2 days, then plan is to DC amitriptyline and resume divalproex 250 q12.\par 2. If amitriptyline trial is not beneficial, at next visit we will consider change of gabapentin for pregabalin, as the latter may be better for central pain. \par 3. RTC in 4 weeks\par \par I have spent 25 minutes of face to face time with the patient reviewing the cause of seizures or seizure-like events, assessing the risk of recurrence, educating the patient or family to recognize seizures, and discussing possible treatment options and possible side effects of seizure medications. I also discussed seizure safety, and ways of reducing seizure risk. Greater than 50% of the encounter time was spent on counseling and coordination of care for reviewing records in [employer], discussion with patient regarding plan. \par \par Case seen and discussed with Dr. Mcclain\par \par I have spent 25 minutes of face to face time with the patient reviewing the cause of seizures or seizure-like events, assessing the risk of recurrence, educating the patient or family to recognize seizures, and discussing possible treatment options and possible side effects of seizure medications. I also discussed seizure safety, and ways of reducing seizure risk. Greater than 50% of the encounter time was spent on counseling and coordination of care for reviewing records in [employer], discussion with patient regarding plan.\par

## 2020-04-16 NOTE — HISTORY OF PRESENT ILLNESS
[Home] : at home, [unfilled] , at the time of the visit. [Other Location: e.g. Home (Enter Location, City,State)___] : at [unfilled] [FreeTextEntry1] : *** 04/15/2020  ***\par -Appointment was conducted by video-conference in place of cancelled office appointment due to due to heightened concern for coronavirus infection risk.\par -Physician location: home\par -Patient location: home\par -Individuals on call: Dr. Mcclain STEVIE SÁNCHEZUEL\par  \par Ms. JACKSON reports that she is taking divalproex 250 in am, and amitriptyline 25 in HS.  She has dry mouth and increased daytime sleepiness. Her central pain control is about same as prior to amitriptyline and her headaches have resolved. However, she is gaining weight since starting amitriptyline and home-bound due to quarantine. She is also taking gabapentin - stable dose for a number of years. \par \par *** 04/07/2020 ***\par Ms. JACKSON reports that she is taking divalproex  in AM and amitriptyline 25 in HS. She notes greater sleepiness and dry mouth, but headaches have resolved. L leg pain is unchanged. \par \par Plan:\par 1. continue current meds - amitriptyline 25 qHS, and divalproex  qAM\par 2. Ms. JACKSON has f/u appt next week. Will discuss in greater detail pros/cons of increasing amitriptyline dose (and possibly decreasing divalproex dose). \par \par *** 3/4/2020 ***\par Ms. Jackson returns to clinic today for follow up. She reports that everything has been about the same since her previous visit. She states she has acne and her dermatologist thought it was from Depakote. She also reports that her left side of the face is more sensitive to sensation. Endorses that she has abdominal pain on the left side especially when she is in a sitting position. She also noticed that the pain on the left side of her leg tends to go away when she takes Depakote.\par \par *** 12/20/2019  ***\par Ms. JACKSON returns for scheduled follow-up. She is taking divalproex and feels that she is less irritable since dc'ing topiramate and starting divalproex.  She has noted increased acne since starting divalproex  Her sciatica is better, but she still gets burning dysesthetic pain in the left foot, not better on divalproex but partly relieved by gabapentin. Ms. 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  Dr. Baker recommended amitriptyline, which has not yet been tried. \par \par *** 09/11/2019  ***\par Ms. JACKSON reports that mood continues poor and pain is a significant problem. She did not re-start divalproex after last visit. She initially lost weight after stopping divalproex and starting low dose topiramate, but she gained that weight back despite continuing on topiramate. She remains on low dose topiramate but HA continue to be a problem. Ms. JACKSON continues to notice that her mood is more labile since stopping divalproex.  She recently travelled to [facility] and -among other activities - went to \[facility] [place] [facility]\"", but did not enjoy the trip or feel improved. She is ambivalent about restarting divalproex. \par Ms. JACKSON notes that she has burning pain on L side, to some degree she has pain below the knees bilaterally, but worse left. Pain above the knee is largely limited to L.  She is contemplating a venous stripping procedure bilaterally which she has been told will improve her pain. \par \par *** 07/03/2019  ***\par  Ms. JACKSON reports that weight is dropping, constipation better, HA ok, present every day, but not as bothersome.  However, Ms. JACKSON notes that she experiences a sense of confusion and sense of stress were better with divalproex. She did not see any improvement on either oxcarbazepine or topiramate. \par \par *** 04/22/2019  ***\par Ms. STEVIE JACKSON returns for scheduled follow-up appointment. Ms. JACKSON reports that in the interval since her last visit, she is doing well. She reduced divalproex ER to 250 qHS and has had no interval events. However, she feels that her behavior is subtly different, she is less inhibited when confronted with problem, her headaches are slightly worse, she has somewhat more difficult concentrating. Ms. JACKSON attributes these changes to her reduction of divalproex. Ms. JACKSON also elaborated on her chronic pain, which she describes as burning dysesthesias in the left leg.  She recently had venous procedure (?stripping) in both legs by vascular surgeon that she feels improved pain slightly.  The burning sensation is constant, limits her ability to walk distances (which increases pain).\par \par *** 03/12/2019 ***\par Ms. JACKSON returns for follow-up. She has not had any interval seizures. She is taking a very small dose of divalproex  daily, and gabapentin 400mg three times daily. Her history of seizures - documented below - is unclear. She denies any side effects. She is working as an . \par \par *** 04/12/2017 ***\par Ms. Jackson is a 52-year-old right-handed woman who developed intracerebral hemorrhage in December 2016 affecting the right putamen and subinsular white matter. Past medical history significant for hypertension. She has been seen by Dr. Castellon of the stroke service, and Dr. Livingston of the neuromuscular service. Her initial symptoms of ICH consisted of severe headache with some left-sided numbness. After she was diagnosed with intracerebral hemorrhage, she complained of painful paresthesias and cramping in the left leg that are present nearly every day, and fluctuate in intensity throughout the day. She had previously been on atorvastatin, which was discontinued out of concern for her cramping. She also complains of "pulling" in her left sided the neck and face. These symptoms also are present intermittently throughout the day, and never entirely go away. She started taking gabapentin for some of the painful paresthesias in the leg, and reports some benefit. She does not take it everyday.\par \par On February 6, she experienced a single episode of painful paresthesias in the jaw. Thereafter, she was prescribed Keppra 500 mg twice a day. She felt very tired on Keppra, and the dose was reduced to 500 mg at bedtime. She has not had recurrence of the episodes. She ran out of Keppra 2 nights ago, and did not take her dose last night. She continues to feel tired, and has not had the episodes recur thus far.\par \par She is accompanied by her son. She and her son both deny any episodes of altered awareness, lip smacking, staring, facial twitching, or other signs of convulsion. She denies incontinence or tongue biting.\par \par Patient endorses difficulty concentrating, anxiety, mild intermittent headaches, intermittent dizziness, numbness and tingling as described, worsened balance.

## 2020-04-17 ENCOUNTER — TRANSCRIPTION ENCOUNTER (OUTPATIENT)
Age: 55
End: 2020-04-17

## 2020-04-18 NOTE — PHYSICAL EXAM
[FreeTextEntry1] : alert and oriented x 3, speech fluent, names easily, follows commands, good recall\par EOM full without sustained nystagmus, face grossly symmetrical, no dysarthria\par Motor - moves all extremities spontaneously\par Coord - no tremor, ataxia\par Gait - stands without difficulty, gait favors left. \par

## 2020-04-18 NOTE — ASSESSMENT
[FreeTextEntry1] : Ms. JACKSON has a history of ICH  and associated with that had a number of focal neurological events including painful burning paresthesias continuously, numbness in left foot. Reports that she continues to be irritable. Endorses having sensitivity to touch on the left face and having abdominal pain on the left. \par Most likely chronic L LE dysesthetic burning pain is due to location of hemorrhage and residual hemosiderin beneath pain representation of R posterior insula or interruption of thalamocortical pain fibers.  \par \par Plan\par 1. Start Amitriptyline 25mg at bedtime with a plan to increase at next visit to 50mg\par 2. Take Divalproex ER 250mg at bedtime for 2 weeks and then discontinue\par 3. RTC in 6 weeks\par \par Case seen and discussed with Dr. Mcclain\par \par I have spent 25 minutes of face to face time with the patient reviewing the cause of seizures or seizure-like events, assessing the risk of recurrence, educating the patient or family to recognize seizures, and discussing possible treatment options and possible side effects of seizure medications. I also discussed seizure safety, and ways of reducing seizure risk. Greater than 50% of the encounter time was spent on counseling and coordination of care for reviewing records in [employer], discussion with patient regarding plan.\par

## 2020-04-18 NOTE — HISTORY OF PRESENT ILLNESS
[FreeTextEntry1] : ***  3/4/2020  ***\par Ms. Jackson returns to clinic today for follow up. She reports that everything has been about the same since her previous visit. She states she has acne and her dermatologist thought it was from Depakote. She also reports that her left side of the face is more sensitive to sensation. Endorses that she has abdominal pain on the left side especially when she is in a sitting position. She also noticed that the pain on the left side of her leg tends to go away when she takes Depakote.\par \par *** 12/20/2019  ***\par Ms. JACKSON returns for scheduled follow-up. She is taking divalproex and feels that she is less irritable since dc'ing topiramate and starting divalproex.  She has noted increased acne since starting divalproex  Her sciatica is better, but she still gets burning dysesthetic pain in the left foot, not better on divalproex but partly relieved by gabapentin. Ms. 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  Dr. Baker recommended amitriptyline, which has not yet been tried. \par \par *** 09/11/2019  ***\par Ms. JACKSON reports that mood continues poor and pain is a significant problem. She did not re-start divalproex after last visit. She initially lost weight after stopping divalproex and starting low dose topiramate, but she gained that weight back despite continuing on topiramate. She remains on low dose topiramate but HA continue to be a problem. Ms. JACKSON continues to notice that her mood is more labile since stopping divalproex.  She recently travelled to [facility] and -among other activities - went to \[facility] [place] [facility]\"", but did not enjoy the trip or feel improved. She is ambivalent about restarting divalproex. \par Ms. JACKSON notes that she has burning pain on L side, to some degree she has pain below the knees bilaterally, but worse left. Pain above the knee is largely limited to L.  She is contemplating a venous stripping procedure bilaterally which she has been told will improve her pain. \par \par *** 07/03/2019  ***\par  Ms. JACKSON reports that weight is dropping, constipation better, HA ok, present every day, but not as bothersome.  However, Ms. JACKSON notes that she experiences a sense of confusion and sense of stress were better with divalproex. She did not see any improvement on either oxcarbazepine or topiramate. \par \par *** 04/22/2019  ***\par Ms. STEVIE JACKSON returns for scheduled follow-up appointment. Ms. JACKSON reports that in the interval since her last visit, she is doing well. She reduced divalproex ER to 250 qHS and has had no interval events. However, she feels that her behavior is subtly different, she is less inhibited when confronted with problem, her headaches are slightly worse, she has somewhat more difficult concentrating. Ms. JACKSON attributes these changes to her reduction of divalproex. Ms. JACKSON also elaborated on her chronic pain, which she describes as burning dysesthesias in the left leg.  She recently had venous procedure (?stripping) in both legs by vascular surgeon that she feels improved pain slightly.  The burning sensation is constant, limits her ability to walk distances (which increases pain).\par \par *** 03/12/2019 ***\par Ms. JACKSON returns for follow-up. She has not had any interval seizures. She is taking a very small dose of divalproex  daily, and gabapentin 400mg three times daily. Her history of seizures - documented below - is unclear. She denies any side effects. She is working as an . \par \par *** 04/12/2017 ***\par Ms. Jackson is a 52-year-old right-handed woman who developed intracerebral hemorrhage in December 2016 affecting the right putamen and subinsular white matter. Past medical history significant for hypertension. She has been seen by Dr. Castellon of the stroke service, and Dr. Livingston of the neuromuscular service. Her initial symptoms of ICH consisted of severe headache with some left-sided numbness. After she was diagnosed with intracerebral hemorrhage, she complained of painful paresthesias and cramping in the left leg that are present nearly every day, and fluctuate in intensity throughout the day. She had previously been on atorvastatin, which was discontinued out of concern for her cramping. She also complains of "pulling" in her left sided the neck and face. These symptoms also are present intermittently throughout the day, and never entirely go away. She started taking gabapentin for some of the painful paresthesias in the leg, and reports some benefit. She does not take it everyday.\par \par On February 6, she experienced a single episode of painful paresthesias in the jaw. Thereafter, she was prescribed Keppra 500 mg twice a day. She felt very tired on Keppra, and the dose was reduced to 500 mg at bedtime. She has not had recurrence of the episodes. She ran out of Keppra 2 nights ago, and did not take her dose last night. She continues to feel tired, and has not had the episodes recur thus far.\par \par She is accompanied by her son. She and her son both deny any episodes of altered awareness, lip smacking, staring, facial twitching, or other signs of convulsion. She denies incontinence or tongue biting.\par \par Patient endorses difficulty concentrating, anxiety, mild intermittent headaches, intermittent dizziness, numbness and tingling as described, worsened balance.

## 2020-04-18 NOTE — END OF VISIT
[FreeTextEntry3] : Ms. STEVIE JACKSON was seen and examined with Epilepsy fellow, Dr. Iwona Fraga.  I reviewed history and plan with Ms. JACKSON and edited the note.

## 2020-04-28 ENCOUNTER — APPOINTMENT (OUTPATIENT)
Dept: NEUROLOGY | Facility: CLINIC | Age: 55
End: 2020-04-28
Payer: COMMERCIAL

## 2020-04-28 PROCEDURE — 99245 OFF/OP CONSLTJ NEW/EST HI 55: CPT | Mod: GT

## 2020-04-28 NOTE — PHYSICAL EXAM
[FreeTextEntry1] : This examination was done using primarily the NIH stroke scale.  Generally looked well. Mental status exam: Alert, oriented x3, speech fluent/prosodic without paraphasias; comprehension intact; memory and fund of knowledge intact. On cranial nerve exam, I was unable to see the fundi; the remainder of cranial nerves II through XII was intact. On motor exam  There was no drift.  Moves all extremities well versus gravity.  Fine finger movements are at least mildly clumsy and slow bilaterally of uncertain significance.  Power could not be tested. Reflexes could not be tested. Coordination in the arms was intact.  Gait was within normal limits or perhaps minimally unsteady. Tandem gait and Romberg were not tested. Sensory exam could not be tested. [General Appearance - Alert] : alert [General Appearance - In No Acute Distress] : in no acute distress [Oriented To Time, Place, And Person] : oriented to person, place, and time [Impaired Insight] : insight and judgment were intact [Affect] : the affect was normal [Extraocular Movements] : extraocular movements were intact [Sclera] : the sclera and conjunctiva were normal [Outer Ear] : the ears and nose were normal in appearance [Hearing Threshold Finger Rub Not [place]] : hearing was normal [Neck Appearance] : the appearance of the neck was normal [Skin Color & Pigmentation] : normal skin color and pigmentation [Abnormal Walk] : normal gait

## 2020-04-28 NOTE — CONSULT LETTER
[Dear  ___] : Dear  [unfilled], [Please see my note below.] : Please see my note below. [Consult Closing:] : Thank you very much for allowing me to participate in the care of this patient.  If you have any questions, please do not hesitate to contact me. [Sincerely,] : Sincerely, [FreeTextEntry2] : Zoltan Meza MD\[facility]

## 2020-04-28 NOTE — DISCUSSION/SUMMARY
[FreeTextEntry1] : Summary from Dr. Austin Castellon's note dated 2/14/2019-with modification.\par \par On 12/13/16, she noticed severe HA and associated neck pain, as well as left-sided numbness, due to a right basal ganglia hemorrhage (probably just impinging on the e right lateral thalamus), most likely due to chronic hypertension.  Subsequent MRI suggested the possibility of a tiny cavernoma, but I doubt that this is clinically relevant and, in any case, the deep location would make surgical resection difficult.  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  Overall she appears to be neurologically stable and doing well.  Her main complaints at are centered around her post stroke central pain syndrome and other types of pain such as sciatica, for which she is being treated.  From the neurovascular standpoint, I advised her whenever possible to have frequent follow-up with you for her blood pressure, since blood pressure management is the most effective strategy to prevent recurrent ICH.\par \par She can follow-up with me in approximately 6 months or at the next available appointment.  I hope that she remains free of further serious trouble.

## 2020-04-28 NOTE — REVIEW OF SYSTEMS
[Recent Weight Gain (___ Lbs)] : recent [unfilled] ~Ulb weight gain [As Noted in HPI] : as noted in HPI [Chest Pain] : chest pain [Negative] : Integumentary [FreeTextEntry5] : Chest pain diagnosed as noncardiac, probably GI-related [FreeTextEntry7] : Chest tightness diagnosed as some kind of GI condition [FreeTextEntry9] : Sciatica

## 2020-04-28 NOTE — HISTORY OF PRESENT ILLNESS
[FreeTextEntry1] : 55-year-old right-handed lady.\par \par Summary from Dr. Austin Castellon's note dated 2/14/2019-with modification.\par \par On 12/13/16, she noticed severe HA and associated neck pain. . She was seen by PCP as she noticed L sided numbness. She was given ABx by her PCP for possible sinus infection. On 12/25/16, she noticed some worsening of her symptoms and she was brought to [facility] . She was found to have ICH. She was evaluated by Dr. Livingston in 2/17 and she was started on levetiracetam 500 mg BID for episodes concerning for seizures (spreading sensory paresthesia concerning for sensory seizures). She was not able to tolerate levetiracetam  excessive sleepiness and was it was discontinued. She was also evaluated by Dr. Mcclain. She is currently not on any AEDs. In 2/18, she had severe headaches. She underwent LP to rule out CNS infection. She developed worsening of the HAs after the LP, presumed to be due to low pressure HAs. She was treated with VPA . She reports to try to taper herself off of the VPA but developed rebound HAs.  She also reports to have developed abnormal hearing after her thalamic ICH - described as "amplified voices but without any tinnitus and associated with mild hearing loss in the left ear".\par \par ICH workup:\par CT brain (12/25/16): Right posterior putaminal hemorrhage with surrounding vasogenic edema\par CTA head and neck (12/25/16): Unremarkable\par MRI brain (12/27/16): Redemonstration of posterior putaminal intracerebral hemorrhage with surrounding vasogenic edema, mild leukoaraiosis and no evidence of abnormal contrast enhancement, no evidence of prior microhemorrhages\par MRI brain (2/6/17): Expected evolution of right basal ganglia ICH without any evidence of acute stroke\par MRI brain/MRA head (7/17): Expected evolution and almost resolution of right basal ganglia ICH, no abnormal contrast enhancement is noted in the region, no evidence of vascular malformation\par \par Repeat MRI brain with contrast (2/26/19-compared to 7/9/2017) was essentially unchanged, showing the chronic hemorrhage in the right lentiform nucleus, with small enhancing vessels, 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  \par Repeat MRA neck and head (2/26/2019) was unremarkable.\par \par 4/28/2020. This service was provided using telehealth (video).\par Location of patient: Home\par Location of provider: Office\par Names of all persons participating in the telehealth service and their role in the encounter: Patient\par \par She reports no new focal neurologic symptoms.  She complains of persistent left-sided burning pain and headaches.  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  She can walk independently and sometimes uses a cane, is independent in all activities of daily living, but does not drive as she used to because she has trouble localizing sounds (MRS = 2).\par

## 2020-04-29 ENCOUNTER — APPOINTMENT (OUTPATIENT)
Dept: NEUROLOGY | Facility: CLINIC | Age: 55
End: 2020-04-29
Payer: COMMERCIAL

## 2020-04-29 DIAGNOSIS — R52 PAIN, UNSPECIFIED: ICD-10-CM

## 2020-04-29 PROCEDURE — 99213 OFFICE O/P EST LOW 20 MIN: CPT | Mod: 95

## 2020-04-29 NOTE — ASSESSMENT
[FreeTextEntry1] : Ms. JACKSON has a history of ICH  and associated with that had a number of focal neurological events including painful burning paresthesias continuously, numbness in left foot. Reports that she continues to be irritable. Endorses having sensitivity to touch on the left face and having abdominal pain on the left. \par Most likely chronic L LE dysesthetic burning pain is due to location of hemorrhage and residual hemosiderin beneath pain representation of R posterior insula or interruption of thalamocortical pain fibers.  \par \par Plan\par 1. continue amitriptyline 50 mg at bedtime\par 2. Continue divalproex ER 250mg in AM\par 3. follow-up visit in 6 weeks\par 4. Ms. JACKSON was asked to record her BP at least twice a week and report results at next visit and to measure her weight at least once a week.

## 2020-04-29 NOTE — REASON FOR VISIT
[Home] : at home, [unfilled] , at the time of the visit. [Patient] : the patient [Other Location: e.g. Home (Enter Location, City,State)___] : at [unfilled] [Self] : self [Follow-Up: _____] : a [unfilled] follow-up visit

## 2020-04-29 NOTE — HISTORY OF PRESENT ILLNESS
[FreeTextEntry1] : *** 2020  ***\par -Appointment was conducted by video-conference in place of office appointment due to due to heightened concern for coronavirus infection risk.\par -Verbal consent given on 2020 at 13:00 by the patient STEVIE JACKSON ( Mar 19 1965) who understands that tele-visit will be charged to insurance and may involve co-pay for patient.\par -Physician location: home\par -Patient location: home\par -Individuals on call: STEVIE Tena\par  Ms. JACKSON reports that pain is relatively controlled on current regimen of amitriptyline 50 qHS and divalproex 250 qAM. She feels that current benefit is similar to how she felt on just divalproex.  She report increased sleepiness and dry mouth, but does not interfere with her work as .  She saw Dr. Libman for stroke follow-up, who recommended monitoring of BP and f/u in 6 mo. \par \par *** 04/15/2020  ***\par -Appointment was conducted by video-conference in place of cancelled office appointment due to due to heightened concern for coronavirus infection risk.\par -Physician location: home\par -Patient location: home\par -Individuals on call: STEVIE Tena\par  \par Ms. JACKSON reports that she is taking divalproex 250 in am, and amitriptyline 25 in HS.  She has dry mouth and increased daytime sleepiness. Her central pain control is about same as prior to amitriptyline and her headaches have resolved. However, she is gaining weight since starting amitriptyline and home-bound due to quarantine. She is also taking gabapentin - stable dose for a number of years. \par \par *** 2020 ***\par Ms. JACKSON reports that she is taking divalproex  in AM and amitriptyline 25 in HS. She notes greater sleepiness and dry mouth, but headaches have resolved. L leg pain is unchanged. \par \par Plan:\par 1. continue current meds - amitriptyline 25 qHS, and divalproex  qAM\par 2. Ms. JACKSON has f/u appt next week. Will discuss in greater detail pros/cons of increasing amitriptyline dose (and possibly decreasing divalproex dose). \par \par *** 3/4/2020 ***\par Ms. Jackson returns to clinic today for follow up. She reports that everything has been about the same since her previous visit. She states she has acne and her dermatologist thought it was from Depakote. She also reports that her left side of the face is more sensitive to sensation. Endorses that she has abdominal pain on the left side especially when she is in a sitting position. She also noticed that the pain on the left side of her leg tends to go away when she takes Depakote.\par \par *** 2019  ***\par Ms. JACKSON returns for scheduled follow-up. She is taking divalproex and feels that she is less irritable since dc'ing topiramate and starting divalproex.  She has noted increased acne since starting divalproex  Her sciatica is better, but she still gets burning dysesthetic pain in the left foot, not better on divalproex but partly relieved by gabapentin. Ms. 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  Dr. Baker recommended amitriptyline, which has not yet been tried. \par \par *** 2019  ***\par Ms. JACKSON reports that mood continues poor and pain is a significant problem. She did not re-start divalproex after last visit. She initially lost weight after stopping divalproex and starting low dose topiramate, but she gained that weight back despite continuing on topiramate. She remains on low dose topiramate but HA continue to be a problem. Ms. JACKSON continues to notice that her mood is more labile since stopping divalproex.  She recently travelled to [facility] and -among other activities - went to [facility], but did not enjoy the trip or feel improved. She is ambivalent about restarting divalproex. \par Ms. JACKSON notes that she has burning pain on L side, to some degree she has pain below the knees bilaterally, but worse left. Pain above the knee is largely limited to L.  She is contemplating a venous stripping procedure bilaterally which she has been told will improve her pain. \par \par *** 2019  ***\par  Ms. JACKSON reports that weight is dropping, constipation better, HA ok, present every day, but not as bothersome.  However, Ms. JACKSON notes that she experiences a sense of confusion and sense of stress were better with divalproex. She did not see any improvement on either oxcarbazepine or topiramate. \par \par *** 2019  ***\par Ms. STEVIE JACKSON returns for scheduled follow-up appointment. Ms. JACKSON reports that in the interval since her last visit, she is doing well. She reduced divalproex ER to 250 qHS and has had no interval events. However, she feels that her behavior is subtly different, she is less inhibited when confronted with problem, her headaches are slightly worse, she has somewhat more difficult concentrating. Ms. JACKSON attributes these changes to her reduction of divalproex. Ms. JACKSON also elaborated on her chronic pain, which she describes as burning dysesthesias in the left leg.  She recently had venous procedure (?stripping) in both legs by vascular surgeon that she feels improved pain slightly.  The burning sensation is constant, limits her ability to walk distances (which increases pain).\par \par *** 2019 ***\par Ms. JACKSON returns for follow-up. She has not had any interval seizures. She is taking a very small dose of divalproex  daily, and gabapentin 400mg three times daily. Her history of seizures - documented below - is unclear. She denies any side effects. She is working as an . \par \par *** 2017 ***\par Ms. Jackson is a 52-year-old right-handed woman who developed intracerebral hemorrhage in 2016 affecting the right putamen and subinsular white matter. Past medical history significant for hypertension. She has been seen by Dr. Castellon of the stroke service, and Dr. Livingston of the neuromuscular service. Her initial symptoms of ICH consisted of severe headache with some left-sided numbness. After she was diagnosed with intracerebral hemorrhage, she complained of painful paresthesias and cramping in the left leg that are present nearly every day, and fluctuate in intensity throughout the day. She had previously been on atorvastatin, which was discontinued out of concern for her cramping. She also complains of "pulling" in her left sided the neck and face. These symptoms also are present intermittently throughout the day, and never entirely go away. She started taking gabapentin for some of the painful paresthesias in the leg, and reports some benefit. She does not take it everyday.\par \par On , she experienced a single episode of painful paresthesias in the jaw. Thereafter, she was prescribed Keppra 500 mg twice a day. She felt very tired on Keppra, and the dose was reduced to 500 mg at bedtime. She has not had recurrence of the episodes. She ran out of Keppra 2 nights ago, and did not take her dose last night. She continues to feel tired, and has not had the episodes recur thus far.\par \par She is accompanied by her son. She and her son both deny any episodes of altered awareness, lip smacking, staring, facial twitching, or other signs of convulsion. She denies incontinence or tongue biting.\par \par Patient endorses difficulty concentrating, anxiety, mild intermittent headaches, intermittent dizziness, numbness and tingling as described, worsened balance.

## 2020-04-29 NOTE — PHYSICAL EXAM
[FreeTextEntry1] : alert and oriented x 3, speech fluent, names easily, follows requests, good recall for recent and remote events.\par EOM full without sustained nystagmus, PERRL, face symmetrical, no dysarthria\par Motor - normal motion in upper extremities bilaterally. normal rapid-alternating movements.\par Sensory - not tested\par Coord - no tremor, ataxia\par Gait - stands without difficulty, normal gait, tandem not tested.

## 2020-06-18 ENCOUNTER — APPOINTMENT (OUTPATIENT)
Dept: NEUROLOGY | Facility: CLINIC | Age: 55
End: 2020-06-18
Payer: COMMERCIAL

## 2020-06-18 PROCEDURE — 93892 TCD EMBOLI DETECT W/O INJ: CPT

## 2020-06-18 PROCEDURE — 93880 EXTRACRANIAL BILAT STUDY: CPT

## 2020-06-18 PROCEDURE — 93886 INTRACRANIAL COMPLETE STUDY: CPT

## 2020-10-26 ENCOUNTER — APPOINTMENT (OUTPATIENT)
Dept: NEUROLOGY | Facility: CLINIC | Age: 55
End: 2020-10-26

## 2020-10-27 ENCOUNTER — APPOINTMENT (OUTPATIENT)
Dept: NEUROLOGY | Facility: CLINIC | Age: 55
End: 2020-10-27
Payer: COMMERCIAL

## 2020-10-27 VITALS — DIASTOLIC BLOOD PRESSURE: 89 MMHG | SYSTOLIC BLOOD PRESSURE: 144 MMHG

## 2020-10-27 VITALS — TEMPERATURE: 96.9 F

## 2020-10-27 PROCEDURE — 99072 ADDL SUPL MATRL&STAF TM PHE: CPT

## 2020-10-27 PROCEDURE — 99213 OFFICE O/P EST LOW 20 MIN: CPT

## 2020-11-03 ENCOUNTER — APPOINTMENT (OUTPATIENT)
Dept: NEUROLOGY | Facility: CLINIC | Age: 55
End: 2020-11-03
Payer: COMMERCIAL

## 2020-11-03 VITALS
HEIGHT: 63 IN | BODY MASS INDEX: 31.54 KG/M2 | WEIGHT: 178 LBS | SYSTOLIC BLOOD PRESSURE: 140 MMHG | DIASTOLIC BLOOD PRESSURE: 96 MMHG | HEART RATE: 78 BPM

## 2020-11-03 DIAGNOSIS — G89.0 CENTRAL PAIN SYNDROME: ICD-10-CM

## 2020-11-03 DIAGNOSIS — M54.32 SCIATICA, LEFT SIDE: ICD-10-CM

## 2020-11-03 PROCEDURE — 99214 OFFICE O/P EST MOD 30 MIN: CPT

## 2020-11-03 PROCEDURE — 99072 ADDL SUPL MATRL&STAF TM PHE: CPT

## 2020-11-03 RX ORDER — LEVOTHYROXINE SODIUM 0.03 MG/1
25 TABLET ORAL
Qty: 90 | Refills: 0 | Status: ACTIVE | COMMUNITY
Start: 2020-07-02

## 2020-11-03 RX ORDER — AZELAIC ACID 0.15 G/G
15 GEL TOPICAL
Qty: 50 | Refills: 0 | Status: DISCONTINUED | COMMUNITY
Start: 2020-07-24

## 2020-11-03 RX ORDER — MULTIVIT-MIN/FOLIC/VIT K/LYCOP 400-300MCG
25 MCG TABLET ORAL
Refills: 0 | Status: DISCONTINUED | COMMUNITY
End: 2020-11-03

## 2020-11-03 NOTE — HISTORY OF PRESENT ILLNESS
[FreeTextEntry1] : *** 2020  ***\par Ms. JACKSON reports that her pain has been relatively well controlled on combination of amitriptyline 50 qHS and divalproex 250 qD. She is also taking gabapentin 400 as needed. She notes that she has gained weight as a result of taking these medications and she is also concerned about acne as a result of the medications. However, she feels the benefit so far outweighs the cost. \par \par *** 2020  ***\par -Appointment was conducted by video-conference in place of office appointment due to due to heightened concern for coronavirus infection risk.\par -Verbal consent given on 2020 at 13:00 by the patient STEVIE JACKSON ( Mar 19 1965) who understands that tele-visit will be charged to insurance and may involve co-pay for patient.\par -Physician location: home\par -Patient location: home\par -Individuals on call: STEVIE Tena\par  Ms. JACKSON reports that pain is relatively controlled on current regimen of amitriptyline 50 qHS and divalproex 250 qAM. She feels that current benefit is similar to how she felt on just divalproex.  She report increased sleepiness and dry mouth, but does not interfere with her work as .  She saw Dr. Libman for stroke follow-up, who recommended monitoring of BP and f/u in 6 mo. \par \par *** 04/15/2020  ***\par -Appointment was conducted by video-conference in place of cancelled office appointment due to due to heightened concern for coronavirus infection risk.\par -Physician location: home\par -Patient location: home\par -Individuals on call: STEVIE Tena\par  \par Ms. JACKSON reports that she is taking divalproex 250 in am, and amitriptyline 25 in HS.  She has dry mouth and increased daytime sleepiness. Her central pain control is about same as prior to amitriptyline and her headaches have resolved. However, she is gaining weight since starting amitriptyline and home-bound due to quarantine. She is also taking gabapentin - stable dose for a number of years. \par \par *** 2020 ***\par Ms. JACKSON reports that she is taking divalproex  in AM and amitriptyline 25 in HS. She notes greater sleepiness and dry mouth, but headaches have resolved. L leg pain is unchanged. \par \par Plan:\par 1. continue current meds - amitriptyline 25 qHS, and divalproex  qAM\par 2. Ms. JACKSON has f/u appt next week. Will discuss in greater detail pros/cons of increasing amitriptyline dose (and possibly decreasing divalproex dose). \par \par *** 3/4/2020 ***\par Ms. Jackson returns to clinic today for follow up. She reports that everything has been about the same since her previous visit. She states she has acne and her dermatologist thought it was from Depakote. She also reports that her left side of the face is more sensitive to sensation. Endorses that she has abdominal pain on the left side especially when she is in a sitting position. She also noticed that the pain on the left side of her leg tends to go away when she takes Depakote.\par \par *** 2019  ***\par Ms. JACKSON returns for scheduled follow-up. She is taking divalproex and feels that she is less irritable since dc'ing topiramate and starting divalproex.  She has noted increased acne since starting divalproex  Her sciatica is better, but she still gets burning dysesthetic pain in the left foot, not better on divalproex but partly relieved by gabapentin. Ms. 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  Dr. Baker recommended amitriptyline, which has not yet been tried. \par \par *** 2019  ***\par Ms. JACKSON reports that mood continues poor and pain is a significant problem. She did not re-start divalproex after last visit. She initially lost weight after stopping divalproex and starting low dose topiramate, but she gained that weight back despite continuing on topiramate. She remains on low dose topiramate but HA continue to be a problem. Ms. JACKSON continues to notice that her mood is more labile since stopping divalproex.  She recently travelled to [facility] and -among other activities - went [place], but did not enjoy the trip or feel improved. She is ambivalent about restarting divalproex. \par Ms. JACKSON notes that she has burning pain on L side, to some degree she has pain below the knees bilaterally, but worse left. Pain above the knee is largely limited to L.  She is contemplating a venous stripping procedure bilaterally which she has been told will improve her pain. \par \par *** 2019  ***\par  Ms. JACKSON reports that weight is dropping, constipation better, HA ok, present every day, but not as bothersome.  However, Ms. JACKSON notes that she experiences a sense of confusion and sense of stress were better with divalproex. She did not see any improvement on either oxcarbazepine or topiramate. \par \par *** 2019  ***\par Ms. STEVIE JACKSON returns for scheduled follow-up appointment. Ms. JACKSON reports that in the interval since her last visit, she is doing well. She reduced divalproex ER to 250 qHS and has had no interval events. However, she feels that her behavior is subtly different, she is less inhibited when confronted with problem, her headaches are slightly worse, she has somewhat more difficult concentrating. Ms. JACKSON attributes these changes to her reduction of divalproex. Ms. JACKSON also elaborated on her chronic pain, which she describes as burning dysesthesias in the left leg.  She recently had venous procedure (?stripping) in both legs by vascular surgeon that she feels improved pain slightly.  The burning sensation is constant, limits her ability to walk distances (which increases pain).\par \par *** 2019 ***\par Ms. JACKSON returns for follow-up. She has not had any interval seizures. She is taking a very small dose of divalproex  daily, and gabapentin 400mg three times daily. Her history of seizures - documented below - is unclear. She denies any side effects. She is working as an . \par \par *** 2017 ***\par Ms. He is a 52-year-old right-handed woman who developed intracerebral hemorrhage in 2016 affecting the right putamen and subinsular white matter. Past medical history significant for hypertension. She has been seen by Dr. Castellon of the stroke service, and Dr. Livingston of the neuromuscular service. Her initial symptoms of ICH consisted of severe headache with some left-sided numbness. After she was diagnosed with intracerebral hemorrhage, she complained of painful paresthesias and cramping in the left leg that are present nearly every day, and fluctuate in intensity throughout the day. She had previously been on atorvastatin, which was discontinued out of concern for her cramping. She also complains of "pulling" in her left sided the neck and face. These symptoms also are present intermittently throughout the day, and never entirely go away. She started taking gabapentin for some of the painful paresthesias in the leg, and reports some benefit. She does not take it everyday.\par \par On , she experienced a single episode of painful paresthesias in the jaw. Thereafter, she was prescribed Keppra 500 mg twice a day. She felt very tired on Keppra, and the dose was reduced to 500 mg at bedtime. She has not had recurrence of the episodes. She ran out of Keppra 2 nights ago, and did not take her dose last night. She continues to feel tired, and has not had the episodes recur thus far.\par \par She is accompanied by her son. She and her son both deny any episodes of altered awareness, lip smacking, staring, facial twitching, or other signs of convulsion. She denies incontinence or tongue biting.\par \par Patient endorses difficulty concentrating, anxiety, mild intermittent headaches, intermittent dizziness, numbness and tingling as described, worsened balance.

## 2020-11-03 NOTE — ASSESSMENT
[FreeTextEntry1] : Ms. JACKSON has a history of ICH  and associated with that had a number of focal neurological events including painful burning paresthesias continuously, numbness in left foot. She is no longer endorsing increased irritability - feels that divalproex is helpful in stabilizing mood.  Endorses having sensitivity to touch on the left face and having abdominal pain on the left. Most likely chronic L LE dysesthetic burning pain is due to location of hemorrhage and residual hemosiderin beneath pain representation of R posterior insula or interruption of thalamocortical pain fibers. Ms. JACKSON is overall happy with level of pain control, but unhappy about weight gain and acne. Of note - BP has was high on last two office visits. \par Plan\par 1. Continue Amitriptyline 50mg at bedtime \par 2. Continue Divalproex ER 250mg daily \par 3. RTC in 4 months.\par \par I have spent 25 minutes of face to face time with the patient reviewing the cause of seizures or seizure-like events, assessing the risk of recurrence, educating the patient or family to recognize seizures, and discussing possible treatment options and possible side effects of seizure medications. I also discussed seizure safety, and ways of reducing seizure risk. Greater than 50% of the encounter time was spent on counseling and coordination of care for reviewing records in [employer], discussion with patient regarding plan.\par

## 2020-11-10 ENCOUNTER — RX RENEWAL (OUTPATIENT)
Age: 55
End: 2020-11-10

## 2021-04-23 ENCOUNTER — NON-APPOINTMENT (OUTPATIENT)
Age: 56
End: 2021-04-23

## 2021-05-10 ENCOUNTER — APPOINTMENT (OUTPATIENT)
Dept: NEUROLOGY | Facility: CLINIC | Age: 56
End: 2021-05-10

## 2021-05-10 ENCOUNTER — APPOINTMENT (OUTPATIENT)
Dept: NEUROLOGY | Facility: CLINIC | Age: 56
End: 2021-05-10
Payer: COMMERCIAL

## 2021-05-10 VITALS
SYSTOLIC BLOOD PRESSURE: 130 MMHG | HEIGHT: 63 IN | HEART RATE: 75 BPM | BODY MASS INDEX: 32.25 KG/M2 | WEIGHT: 182 LBS | DIASTOLIC BLOOD PRESSURE: 87 MMHG

## 2021-05-10 VITALS
SYSTOLIC BLOOD PRESSURE: 132 MMHG | HEART RATE: 75 BPM | HEIGHT: 63 IN | DIASTOLIC BLOOD PRESSURE: 87 MMHG | WEIGHT: 182 LBS | BODY MASS INDEX: 32.25 KG/M2

## 2021-05-10 PROCEDURE — 99072 ADDL SUPL MATRL&STAF TM PHE: CPT

## 2021-05-10 PROCEDURE — 99215 OFFICE O/P EST HI 40 MIN: CPT

## 2021-05-10 RX ORDER — ROSUVASTATIN CALCIUM 5 MG/1
5 TABLET, FILM COATED ORAL
Refills: 0 | Status: ACTIVE | COMMUNITY
Start: 2020-10-14

## 2021-05-10 RX ORDER — RESVER/WINE/BFL/GRPSD/PC/C/POM 200MG-60MG
125 MCG CAPSULE ORAL
Qty: 30 | Refills: 0 | Status: ACTIVE | COMMUNITY
Start: 2021-01-14

## 2021-05-10 NOTE — DISCUSSION/SUMMARY
[FreeTextEntry1] : Summary from Dr. Austin Castellon's note dated 2/14/2019-with modification.\par \par On 12/13/16, she noticed severe HA and associated neck pain, as well as left-sided numbness, due to a right basal ganglia hemorrhage (probably just impinging on the e right lateral thalamus), most likely due to chronic hypertension.  Subsequent MRI suggested the possibility of a tiny cavernoma, but I doubt that this is clinically relevant and, in any case, the deep location would make surgical resection difficult.  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  Overall she appears to be neurologically stable.  Her main complaints at are centered around her post stroke central pain syndrome and other types of pain such as sciatica.  From the neurovascular standpoint, I advised her whenever possible to have frequent follow-up with you for her blood pressure.\par \par 10/27/2020.  She has persistent left-sided burning pain on gabapentin 400 mg twice daily.  Encouraged her to take gabapentin 3 times daily as had been prescribed.\par \par 5/10/2021.  Overall she is neurologically stable.  From the standpoint of focal deficits, she is doing quite well, but she continues to have a left-sided post stroke pain syndrome, as well as multifocal pain, which interferes with her quality of life.  I suggested consultation with Dr. Lucho Casillas (pain management specialist).\par \par She has daytime fatigue and snores, raising the possibility of sleep apnea.  I have requested a home sleep study.\par \par She should benefit from ongoing management of vascular risk factors, particularly aggressive management of blood pressure, which should decrease the risk of recurrent intracerebral hemorrhage.\par \par She will continue outpatient PT.\par \par She can follow-up with NP Roxana Grande in about 3 months, and with me in 6-12 months.I hope that she remains free of further serious trouble.

## 2021-05-10 NOTE — REVIEW OF SYSTEMS
[FreeTextEntry2] : Daytime fatigue and also snoring [FreeTextEntry5] : Chest pain diagnosed as noncardiac, probably GI-related [FreeTextEntry9] : Sciatica and multifocal joint pain

## 2021-05-10 NOTE — HISTORY OF PRESENT ILLNESS
[FreeTextEntry1] : 56-year-old right-handed lady.\par \par Summary from Dr. Austin Castellon's note dated 2/14/2019-with modification.\par \par On 12/13/16, she noticed severe HA and associated neck pain. . She was seen by PCP as she noticed L sided numbness. She was given ABx by her PCP for possible sinus infection. On 12/25/16, she noticed some worsening of her symptoms and she was brought to [facility] . She was found to have ICH. She was evaluated by Dr. Livingston in 2/17 and she was started on levetiracetam 500 mg BID for episodes concerning for seizures (spreading sensory paresthesia concerning for sensory seizures). She was not able to tolerate levetiracetam  excessive sleepiness and was it was discontinued. She was also evaluated by Dr. Mcclain. She is currently not on any AEDs. In 2/18, she had severe headaches. She underwent LP to rule out CNS infection. She developed worsening of the HAs after the LP, presumed to be due to low pressure HAs. She was treated with VPA . She reports to try to taper herself off of the VPA but developed rebound HAs.  She also reports to have developed abnormal hearing after her thalamic ICH - described as "amplified voices but without any tinnitus and associated with mild hearing loss in the left ear".\par \par ICH workup:\par CT brain (12/25/16): Right posterior putaminal hemorrhage with surrounding vasogenic edema\par CTA head and neck (12/25/16): Unremarkable\par MRI brain (12/27/16): Redemonstration of posterior putaminal intracerebral hemorrhage with surrounding vasogenic edema, mild leukoaraiosis and no evidence of abnormal contrast enhancement, no evidence of prior microhemorrhages\par MRI brain (2/6/17): Expected evolution of right basal ganglia ICH without any evidence of acute stroke\par MRI brain/MRA head (7/17): Expected evolution and almost resolution of right basal ganglia ICH, no abnormal contrast enhancement is noted in the region, no evidence of vascular malformation\par \par Repeat MRI brain with contrast (2/26/19-compared to 7/9/2017) was essentially unchanged, showing the chronic hemorrhage in the right lentiform nucleus, with small enhancing vessels, 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  \par Repeat MRA neck and head (2/26/2019) was unremarkable.\par \par 4/28/2020. This service was provided using telehealth (video).\par Location of patient: Home\par Location of provider: Office\par Names of all persons participating in the telehealth service and their role in the encounter: Patient\par \par She reports no new focal neurologic symptoms.  She complains of persistent left-sided burning pain and headaches.  She has been working at a [employer] for the [employer].  She can walk independently and sometimes uses a cane, is independent in all activities of daily living, but does not drive as she used to because she has trouble localizing sounds (MRS = 2).\par \par 10/27/2020.  Reports persistent left-sided burning pain.\par \par 5/10/2021.  She came to the office today.  She reports persistent left-sided burning pain, particularly affecting her leg.  In addition, she has chronic low back pain and has difficulty standing for more than 1-2 minutes.  She also reports neck pain, knee pain and other areas of pain, not specified.\par

## 2021-05-19 ENCOUNTER — NON-APPOINTMENT (OUTPATIENT)
Age: 56
End: 2021-05-19

## 2021-07-27 ENCOUNTER — APPOINTMENT (OUTPATIENT)
Dept: PAIN MANAGEMENT | Facility: CLINIC | Age: 56
End: 2021-07-27
Payer: COMMERCIAL

## 2021-07-27 VITALS
DIASTOLIC BLOOD PRESSURE: 75 MMHG | WEIGHT: 180 LBS | HEIGHT: 63 IN | HEART RATE: 73 BPM | BODY MASS INDEX: 31.89 KG/M2 | SYSTOLIC BLOOD PRESSURE: 131 MMHG

## 2021-07-27 DIAGNOSIS — M54.16 RADICULOPATHY, LUMBAR REGION: ICD-10-CM

## 2021-07-27 PROCEDURE — 99214 OFFICE O/P EST MOD 30 MIN: CPT

## 2021-07-29 NOTE — PHYSICAL EXAM
[FreeTextEntry1] : Constitutional: No signs of distress. No signs of toxicity. \par MS: Alert and well oriented. Speech fluent. No aphasia. Fund of knowledge intact. \par Psychiatric: Mood stable.\par CN: PERRLA:  No VFC: No Chacha. V1-3 intact. No facial asymmetry. palate elevates symmetrically, tongue midline\par Motor: Adequate bulk, tone, strength. 5/5 strength\par DTR: 2+ and symmetrical; no clonus, babinksi downward BL \par Sensory: intact to primary and secondary modalities; neg Romberg\par Cerebellar and gait: intact\par Eyes: no redness or swelling\par HEENT: intact\par Neck: No masses noted\par Pulmonary: no respiratory distress\par Vascular: no temperature,color changes; no edema\par Musculoskeletal: examination of the cervical spine reveals no midline tenderness, range of motion full upon flexion, extension and lateral rotation. Negative facet tenderness, + tenderness left cervical paraspinal as well as traps bl;  Negative Spurlings bilaterally. examination of the lumbar spine reveals midline tenderness lower lumbar and sacral region; or paraspinal tenderness; Range of motion full upon flexion, extension and lateral rotation; negative facet loading, + tenderness of left sciatic notch, No tenderness of bilateral greater trochanters, Negative ETHAN, negative SLRT bilaterally,\par Skin: No rash.\par

## 2021-07-29 NOTE — REVIEW OF SYSTEMS
[Neck Pain] : neck pain [Lower Back Pain] : lower back pain [Limb Pain] : limb pain [As Noted in HPI] : as noted in HPI [Sleep Apnea] : sleep apnea [Negative] : Heme/Lymph [FreeTextEntry4] : Dry mouth  [FreeTextEntry8] : + urinary frequency and urgency

## 2021-07-29 NOTE — HISTORY OF PRESENT ILLNESS
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 Libman for severe headaches, neck pain as well as burning pain from left mid thigh radiating down to plantar aspect of left foot.  She has a prior h/o headaches in her mid 20s around the time of her menstrual cycle as well as " sinus " related headaches in the winter.  \par \par She his having headaches daily interfering on quality of life. She is having more severe headaches 1-2 per month bitemporal and top of her head described as a pressure 7-8/10 that lasts 1 hour associated with noise, + hyperacusis left ear as well as a fogginess and difficulty concentrating.  She denies triggers, aura, nausea or vomiting.  She reports constant neck pain dull, achy pain 7/10 occasional radiating to left shoulder. \par \par Severe lower back pain radiating down B/l LE L>R 8/10 as well as constant constant burning pain from plantar aspect of left foot radiating upwards to lateral mid thigh.  + urinary urgency and frequency, denies incontinence of bladder.  Her pain is impeding on functioning and normal activities.  She can stand for 1 minutes at a time before having to take rest break due to burning pain in LLE. She is able to ambulate 1 block with SAC. \par \par Previously on Depakote which she felt was extremely helpful for her pain but developed facial rash as well as weight gain. She also failed Topamax.  Currently on Gabapetnin 400 mg BID , Amitriptyline 50mg q hs since September 2020.  \par \par

## 2021-07-29 NOTE — ASSESSMENT
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  \par \par 1. Given worsening radicular LBP since her last MRI Lumbar spine will order MRI Lumbar spine. \par 2. Consider lumbar ROBERT \par 3. She is on Elavil 50 mg q hs as well as Neurontin 400 mg BID. \par 4. Given frequency and intensity of her headaches despite multiple prophylactic medications, she may be a good candidate for Botox. \par \par

## 2021-08-09 ENCOUNTER — OUTPATIENT (OUTPATIENT)
Dept: OUTPATIENT SERVICES | Facility: HOSPITAL | Age: 56
LOS: 1 days | End: 2021-08-09
Payer: COMMERCIAL

## 2021-08-09 ENCOUNTER — APPOINTMENT (OUTPATIENT)
Dept: MRI IMAGING | Facility: CLINIC | Age: 56
End: 2021-08-09
Payer: COMMERCIAL

## 2021-08-09 DIAGNOSIS — M54.16 RADICULOPATHY, LUMBAR REGION: ICD-10-CM

## 2021-08-09 DIAGNOSIS — M54.32 SCIATICA, LEFT SIDE: ICD-10-CM

## 2021-08-09 DIAGNOSIS — H26.40 UNSPECIFIED SECONDARY CATARACT: Chronic | ICD-10-CM

## 2021-08-09 DIAGNOSIS — Z00.8 ENCOUNTER FOR OTHER GENERAL EXAMINATION: ICD-10-CM

## 2021-08-09 PROCEDURE — 72148 MRI LUMBAR SPINE W/O DYE: CPT | Mod: 26

## 2021-08-09 PROCEDURE — 72148 MRI LUMBAR SPINE W/O DYE: CPT

## 2021-08-10 ENCOUNTER — APPOINTMENT (OUTPATIENT)
Dept: NEUROLOGY | Facility: CLINIC | Age: 56
End: 2021-08-10

## 2021-08-11 ENCOUNTER — NON-APPOINTMENT (OUTPATIENT)
Age: 56
End: 2021-08-11

## 2021-09-28 ENCOUNTER — APPOINTMENT (OUTPATIENT)
Dept: PAIN MANAGEMENT | Facility: CLINIC | Age: 56
End: 2021-09-28

## 2021-11-23 ENCOUNTER — RX RENEWAL (OUTPATIENT)
Age: 56
End: 2021-11-23

## 2021-12-29 NOTE — ED PROCEDURE NOTE - CPROC ED TIME OUT STATEMENT1
Writer called pt with recommendations. Pt also advised for pt to rest and hydrate. Pt verbalized understanding.    “Patient's name, , procedure and correct site were confirmed during the [place] Timeout.”

## 2022-03-25 ENCOUNTER — TRANSCRIPTION ENCOUNTER (OUTPATIENT)
Age: 57
End: 2022-03-25

## 2022-03-29 ENCOUNTER — RX RENEWAL (OUTPATIENT)
Age: 57
End: 2022-03-29

## 2022-05-31 ENCOUNTER — NON-APPOINTMENT (OUTPATIENT)
Age: 57
End: 2022-05-31

## 2022-06-13 ENCOUNTER — APPOINTMENT (OUTPATIENT)
Dept: NEUROLOGY | Facility: CLINIC | Age: 57
End: 2022-06-13
Payer: COMMERCIAL

## 2022-06-13 VITALS
HEIGHT: 64 IN | HEART RATE: 75 BPM | WEIGHT: 180 LBS | SYSTOLIC BLOOD PRESSURE: 144 MMHG | DIASTOLIC BLOOD PRESSURE: 86 MMHG | BODY MASS INDEX: 30.73 KG/M2

## 2022-06-13 PROCEDURE — 99215 OFFICE O/P EST HI 40 MIN: CPT

## 2022-06-13 NOTE — REVIEW OF SYSTEMS
[FreeTextEntry2] : Daytime fatigue and also snoring [FreeTextEntry5] : Chest pain diagnosed as noncardiac, probably GI-related [FreeTextEntry8] : Frequency [FreeTextEntry9] : Sciatica and multifocal joint pain

## 2022-06-13 NOTE — DISCUSSION/SUMMARY
[FreeTextEntry1] : Summary from Dr. Austin Castellon's note dated 2/14/2019-with modification.\par \par On 12/13/16, she noticed severe HA and associated neck pain, as well as left-sided numbness, due to a right basal ganglia hemorrhage (probably just impinging on the  right lateral thalamus), most likely due to chronic hypertension.  Subsequent MRI suggested the possibility of a tiny cavernoma, but I doubt that this is clinically relevant and, in any case, the deep location would make surgical resection difficult.  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  Overall she appears to be neurologically stable.  Her main complaints at are centered around her post stroke central pain syndrome and other types of pain such as sciatica.  From the neurovascular standpoint, I advised her whenever possible to have frequent follow-up with you for her blood pressure.\par \par 10/27/2020.  She has persistent left-sided burning pain on gabapentin 400 mg twice daily.  Encouraged her to take gabapentin 3 times daily as had been prescribed.\par \par 5/10/2021.  Overall she is neurologically stable.  From the standpoint of focal deficits, she is doing quite well, but she continues to have a left-sided post stroke pain syndrome, as well as multifocal pain, which interferes with her quality of life.  I suggested consultation with Dr. Lucho Casillas (pain management specialist).\par \par She has daytime fatigue and snores, raising the possibility of sleep apnea.  I have requested a home sleep study.\par \par She should benefit from ongoing management of vascular risk factors, particularly aggressive management of blood pressure, which should decrease the risk of recurrent intracerebral hemorrhage.\par \par She will continue outpatient PT.\par \par 6/13/2022.  Overall she is neurologically stable or even improved in terms of subtle focal deficits, but she continues to experience severe poststroke central pain, as well as what I assume to be orthopedic pain now in her right leg, perhaps because of overcompensation for left-sided pain.  I have again referred her to Dr. Lucho Casillas for pain management.  She will continue PT and chiropractic treatment as well.\par \par She continues to complain of daytime fatigue, raising the possibility of sleep apnea.  I have again requested a home sleep study.\par \par She can follow-up with me or NP Roxana Grande in about 6 months..I hope that she remains free of further serious trouble.

## 2022-06-13 NOTE — HISTORY OF PRESENT ILLNESS
[FreeTextEntry1] : 57-year-old right-handed lady.\par \par Summary from Dr. Austin Castellon's note dated 2/14/2019-with modification.\par \par On 12/13/16, she noticed severe HA and associated neck pain. . She was seen by PCP as she noticed L sided numbness. She was given ABx by her PCP for possible sinus infection. On 12/25/16, she noticed some worsening of her symptoms and she was brought to [facility] . She was found to have ICH. She was evaluated by Dr. Livingston in 2/17 and she was started on levetiracetam 500 mg BID for episodes concerning for seizures (spreading sensory paresthesia concerning for sensory seizures). She was not able to tolerate levetiracetam  excessive sleepiness and was it was discontinued. She was also evaluated by Dr. Mcclain. She is currently not on any AEDs. In 2/18, she had severe headaches. She underwent LP to rule out CNS infection. She developed worsening of the HAs after the LP, presumed to be due to low pressure HAs. She was treated with VPA . She reports to try to taper herself off of the VPA but developed rebound HAs.  She also reports to have developed abnormal hearing after her thalamic ICH - described as "amplified voices but without any tinnitus and associated with mild hearing loss in the left ear".\par \par ICH workup:\par CT brain (12/25/16): Right posterior putaminal hemorrhage with surrounding vasogenic edema\par CTA head and neck (12/25/16): Unremarkable\par MRI brain (12/27/16): Redemonstration of posterior putaminal intracerebral hemorrhage with surrounding vasogenic edema, mild leukoaraiosis and no evidence of abnormal contrast enhancement, no evidence of prior microhemorrhages\par MRI brain (2/6/17): Expected evolution of right basal ganglia ICH without any evidence of acute stroke\par MRI brain/MRA head (7/17): Expected evolution and almost resolution of right basal ganglia ICH, no abnormal contrast enhancement is noted in the region, no evidence of vascular malformation\par \par Repeat MRI brain with contrast (2/26/19-compared to 7/9/2017) was essentially unchanged, showing the chronic hemorrhage in the right lentiform nucleus, with small enhancing vessels, 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  \par Repeat MRA neck and head (2/26/2019) was unremarkable.\par \par 4/28/2020. This service was provided using telehealth (video).\par Location of patient: Home\par Location of provider: Office\par Names of all persons participating in the telehealth service and their role in the encounter: Patient\par \par She reports no new focal neurologic symptoms.  She complains of persistent left-sided burning pain and headaches.  She has been working at a [employer] for the [employer].  She can walk independently and sometimes uses a cane, is independent in all activities of daily living, but does not drive as she used to because she has trouble localizing sounds (MRS = 2).\par \par 10/27/2020.  Reports persistent left-sided burning pain.\par \par 5/10/2021.  She came to the office today.  She reports persistent left-sided burning pain, particularly affecting her leg.  In addition, she has chronic low back pain and has difficulty standing for more than 1-2 minutes.  She also reports neck pain, knee pain and other areas of pain, not specified.\par \par 6/13/2021.  She came to the office today.  She continues to complain of left-sided, particularly left leg burning pain.  She also complains of bilateral ankle and right knee pain as well as low back pain.  She has occasional right temporal headaches which resolved spontaneously.  She is undergoing PT and chiropractic treatment once a week which she feels provide some benefit\par

## 2022-08-23 ENCOUNTER — INPATIENT (INPATIENT)
Facility: HOSPITAL | Age: 57
LOS: 4 days | Discharge: HOME HEALTH | End: 2022-08-28
Attending: STUDENT IN AN ORGANIZED HEALTH CARE EDUCATION/TRAINING PROGRAM | Admitting: STUDENT IN AN ORGANIZED HEALTH CARE EDUCATION/TRAINING PROGRAM

## 2022-08-23 VITALS
TEMPERATURE: 97 F | HEIGHT: 65 IN | DIASTOLIC BLOOD PRESSURE: 92 MMHG | OXYGEN SATURATION: 99 % | SYSTOLIC BLOOD PRESSURE: 150 MMHG | RESPIRATION RATE: 16 BRPM | HEART RATE: 60 BPM

## 2022-08-23 DIAGNOSIS — H26.40 UNSPECIFIED SECONDARY CATARACT: Chronic | ICD-10-CM

## 2022-08-23 PROCEDURE — 99285 EMERGENCY DEPT VISIT HI MDM: CPT

## 2022-08-23 NOTE — ED ADULT TRIAGE NOTE - CHIEF COMPLAINT QUOTE
Pt c/o abdominal pain, nausea/vomiting starting tonight. Pain radiating to back, w/ associated dizziness/weakness. Pt had cholecystectomy on Sunday. No fevers/chills, no drainage from area. PMHx CVA w/ L side residual weakness, HTN

## 2022-08-24 DIAGNOSIS — T81.9XXA UNSPECIFIED COMPLICATION OF PROCEDURE, INITIAL ENCOUNTER: ICD-10-CM

## 2022-08-24 LAB
ALBUMIN SERPL ELPH-MCNC: 4.4 G/DL — SIGNIFICANT CHANGE UP (ref 3.3–5)
ALP SERPL-CCNC: 375 U/L — HIGH (ref 40–120)
ALT FLD-CCNC: 494 U/L — HIGH (ref 4–33)
ANION GAP SERPL CALC-SCNC: 12 MMOL/L — SIGNIFICANT CHANGE UP (ref 7–14)
APPEARANCE UR: ABNORMAL
AST SERPL-CCNC: 380 U/L — HIGH (ref 4–32)
BACTERIA # UR AUTO: NEGATIVE — SIGNIFICANT CHANGE UP
BASE EXCESS BLDV CALC-SCNC: 6.2 MMOL/L — HIGH (ref -2–3)
BASOPHILS # BLD AUTO: 0.04 K/UL — SIGNIFICANT CHANGE UP (ref 0–0.2)
BASOPHILS NFR BLD AUTO: 0.5 % — SIGNIFICANT CHANGE UP (ref 0–2)
BILIRUB SERPL-MCNC: 3.4 MG/DL — HIGH (ref 0.2–1.2)
BILIRUB UR-MCNC: ABNORMAL
BLOOD GAS VENOUS COMPREHENSIVE RESULT: SIGNIFICANT CHANGE UP
BUN SERPL-MCNC: 4 MG/DL — LOW (ref 7–23)
CALCIUM SERPL-MCNC: 9.4 MG/DL — SIGNIFICANT CHANGE UP (ref 8.4–10.5)
CHLORIDE BLDV-SCNC: 99 MMOL/L — SIGNIFICANT CHANGE UP (ref 96–108)
CHLORIDE SERPL-SCNC: 96 MMOL/L — LOW (ref 98–107)
CO2 BLDV-SCNC: 35.6 MMOL/L — HIGH (ref 22–26)
CO2 SERPL-SCNC: 28 MMOL/L — SIGNIFICANT CHANGE UP (ref 22–31)
COLOR SPEC: YELLOW — SIGNIFICANT CHANGE UP
CREAT SERPL-MCNC: 0.54 MG/DL — SIGNIFICANT CHANGE UP (ref 0.5–1.3)
DIFF PNL FLD: NEGATIVE — SIGNIFICANT CHANGE UP
EGFR: 107 ML/MIN/1.73M2 — SIGNIFICANT CHANGE UP
EOSINOPHIL # BLD AUTO: 0.07 K/UL — SIGNIFICANT CHANGE UP (ref 0–0.5)
EOSINOPHIL NFR BLD AUTO: 0.8 % — SIGNIFICANT CHANGE UP (ref 0–6)
EPI CELLS # UR: 5 /HPF — SIGNIFICANT CHANGE UP (ref 0–5)
FLUAV AG NPH QL: SIGNIFICANT CHANGE UP
FLUBV AG NPH QL: SIGNIFICANT CHANGE UP
GAS PNL BLDV: 136 MMOL/L — SIGNIFICANT CHANGE UP (ref 136–145)
GLUCOSE BLDV-MCNC: 135 MG/DL — HIGH (ref 70–99)
GLUCOSE SERPL-MCNC: 141 MG/DL — HIGH (ref 70–99)
GLUCOSE UR QL: ABNORMAL
HCO3 BLDV-SCNC: 34 MMOL/L — HIGH (ref 22–29)
HCT VFR BLD CALC: 40.7 % — SIGNIFICANT CHANGE UP (ref 34.5–45)
HCT VFR BLDA CALC: 39 % — SIGNIFICANT CHANGE UP (ref 34.5–46.5)
HGB BLD CALC-MCNC: 13.1 G/DL — SIGNIFICANT CHANGE UP (ref 11.5–15.5)
HGB BLD-MCNC: 12.8 G/DL — SIGNIFICANT CHANGE UP (ref 11.5–15.5)
IANC: 6.51 K/UL — SIGNIFICANT CHANGE UP (ref 1.8–7.4)
IMM GRANULOCYTES NFR BLD AUTO: 1.2 % — SIGNIFICANT CHANGE UP (ref 0–1.5)
KETONES UR-MCNC: ABNORMAL
LACTATE BLDV-MCNC: 1 MMOL/L — SIGNIFICANT CHANGE UP (ref 0.5–2)
LEUKOCYTE ESTERASE UR-ACNC: NEGATIVE — SIGNIFICANT CHANGE UP
LIDOCAIN IGE QN: 31 U/L — SIGNIFICANT CHANGE UP (ref 7–60)
LYMPHOCYTES # BLD AUTO: 1.06 K/UL — SIGNIFICANT CHANGE UP (ref 1–3.3)
LYMPHOCYTES # BLD AUTO: 12.8 % — LOW (ref 13–44)
MCHC RBC-ENTMCNC: 27.5 PG — SIGNIFICANT CHANGE UP (ref 27–34)
MCHC RBC-ENTMCNC: 31.4 GM/DL — LOW (ref 32–36)
MCV RBC AUTO: 87.3 FL — SIGNIFICANT CHANGE UP (ref 80–100)
MONOCYTES # BLD AUTO: 0.48 K/UL — SIGNIFICANT CHANGE UP (ref 0–0.9)
MONOCYTES NFR BLD AUTO: 5.8 % — SIGNIFICANT CHANGE UP (ref 2–14)
NEUTROPHILS # BLD AUTO: 6.51 K/UL — SIGNIFICANT CHANGE UP (ref 1.8–7.4)
NEUTROPHILS NFR BLD AUTO: 78.9 % — HIGH (ref 43–77)
NITRITE UR-MCNC: NEGATIVE — SIGNIFICANT CHANGE UP
NRBC # BLD: 0 /100 WBCS — SIGNIFICANT CHANGE UP (ref 0–0)
NRBC # FLD: 0 K/UL — SIGNIFICANT CHANGE UP (ref 0–0)
PCO2 BLDV: 61 MMHG — HIGH (ref 39–42)
PH BLDV: 7.35 — SIGNIFICANT CHANGE UP (ref 7.32–7.43)
PH UR: 7.5 — SIGNIFICANT CHANGE UP (ref 5–8)
PLATELET # BLD AUTO: 278 K/UL — SIGNIFICANT CHANGE UP (ref 150–400)
PO2 BLDV: <20 MMHG — SIGNIFICANT CHANGE UP
POTASSIUM BLDV-SCNC: 3.8 MMOL/L — SIGNIFICANT CHANGE UP (ref 3.5–5.1)
POTASSIUM SERPL-MCNC: 3.8 MMOL/L — SIGNIFICANT CHANGE UP (ref 3.5–5.3)
POTASSIUM SERPL-SCNC: 3.8 MMOL/L — SIGNIFICANT CHANGE UP (ref 3.5–5.3)
PROT SERPL-MCNC: 7.5 G/DL — SIGNIFICANT CHANGE UP (ref 6–8.3)
PROT UR-MCNC: NEGATIVE — SIGNIFICANT CHANGE UP
RBC # BLD: 4.66 M/UL — SIGNIFICANT CHANGE UP (ref 3.8–5.2)
RBC # FLD: 14.4 % — SIGNIFICANT CHANGE UP (ref 10.3–14.5)
RBC CASTS # UR COMP ASSIST: 3 /HPF — SIGNIFICANT CHANGE UP (ref 0–4)
RSV RNA NPH QL NAA+NON-PROBE: SIGNIFICANT CHANGE UP
SAO2 % BLDV: 26.1 % — SIGNIFICANT CHANGE UP
SARS-COV-2 RNA SPEC QL NAA+PROBE: SIGNIFICANT CHANGE UP
SODIUM SERPL-SCNC: 136 MMOL/L — SIGNIFICANT CHANGE UP (ref 135–145)
SP GR SPEC: 1.01 — SIGNIFICANT CHANGE UP (ref 1.01–1.05)
UROBILINOGEN FLD QL: ABNORMAL
WBC # BLD: 8.26 K/UL — SIGNIFICANT CHANGE UP (ref 3.8–10.5)
WBC # FLD AUTO: 8.26 K/UL — SIGNIFICANT CHANGE UP (ref 3.8–10.5)
WBC UR QL: 1 /HPF — SIGNIFICANT CHANGE UP (ref 0–5)

## 2022-08-24 PROCEDURE — 74177 CT ABD & PELVIS W/CONTRAST: CPT | Mod: 26,ME

## 2022-08-24 PROCEDURE — 99222 1ST HOSP IP/OBS MODERATE 55: CPT | Mod: GC

## 2022-08-24 PROCEDURE — 78226 HEPATOBILIARY SYSTEM IMAGING: CPT | Mod: 26,GC

## 2022-08-24 PROCEDURE — G1004: CPT

## 2022-08-24 PROCEDURE — 76705 ECHO EXAM OF ABDOMEN: CPT | Mod: 26

## 2022-08-24 RX ORDER — MORPHINE SULFATE 50 MG/1
4 CAPSULE, EXTENDED RELEASE ORAL ONCE
Refills: 0 | Status: DISCONTINUED | OUTPATIENT
Start: 2022-08-24 | End: 2022-08-24

## 2022-08-24 RX ORDER — ENOXAPARIN SODIUM 100 MG/ML
40 INJECTION SUBCUTANEOUS EVERY 24 HOURS
Refills: 0 | Status: DISCONTINUED | OUTPATIENT
Start: 2022-08-24 | End: 2022-08-25

## 2022-08-24 RX ORDER — ATORVASTATIN CALCIUM 80 MG/1
20 TABLET, FILM COATED ORAL AT BEDTIME
Refills: 0 | Status: DISCONTINUED | OUTPATIENT
Start: 2022-08-24 | End: 2022-08-28

## 2022-08-24 RX ORDER — ONDANSETRON 8 MG/1
4 TABLET, FILM COATED ORAL ONCE
Refills: 0 | Status: COMPLETED | OUTPATIENT
Start: 2022-08-24 | End: 2022-08-24

## 2022-08-24 RX ORDER — AMITRIPTYLINE HCL 25 MG
50 TABLET ORAL AT BEDTIME
Refills: 0 | Status: DISCONTINUED | OUTPATIENT
Start: 2022-08-24 | End: 2022-08-28

## 2022-08-24 RX ORDER — PIPERACILLIN AND TAZOBACTAM 4; .5 G/20ML; G/20ML
3.38 INJECTION, POWDER, LYOPHILIZED, FOR SOLUTION INTRAVENOUS ONCE
Refills: 0 | Status: COMPLETED | OUTPATIENT
Start: 2022-08-24 | End: 2022-08-24

## 2022-08-24 RX ORDER — PIPERACILLIN AND TAZOBACTAM 4; .5 G/20ML; G/20ML
3.38 INJECTION, POWDER, LYOPHILIZED, FOR SOLUTION INTRAVENOUS EVERY 8 HOURS
Refills: 0 | Status: DISCONTINUED | OUTPATIENT
Start: 2022-08-24 | End: 2022-08-28

## 2022-08-24 RX ORDER — LEVOTHYROXINE SODIUM 125 MCG
25 TABLET ORAL DAILY
Refills: 0 | Status: DISCONTINUED | OUTPATIENT
Start: 2022-08-24 | End: 2022-08-28

## 2022-08-24 RX ORDER — SODIUM CHLORIDE 9 MG/ML
1000 INJECTION, SOLUTION INTRAVENOUS
Refills: 0 | Status: DISCONTINUED | OUTPATIENT
Start: 2022-08-24 | End: 2022-08-25

## 2022-08-24 RX ORDER — AMLODIPINE BESYLATE 2.5 MG/1
5 TABLET ORAL DAILY
Refills: 0 | Status: DISCONTINUED | OUTPATIENT
Start: 2022-08-24 | End: 2022-08-28

## 2022-08-24 RX ORDER — GABAPENTIN 400 MG/1
400 CAPSULE ORAL THREE TIMES A DAY
Refills: 0 | Status: DISCONTINUED | OUTPATIENT
Start: 2022-08-24 | End: 2022-08-28

## 2022-08-24 RX ORDER — SODIUM CHLORIDE 9 MG/ML
1000 INJECTION INTRAMUSCULAR; INTRAVENOUS; SUBCUTANEOUS ONCE
Refills: 0 | Status: COMPLETED | OUTPATIENT
Start: 2022-08-24 | End: 2022-08-24

## 2022-08-24 RX ADMIN — AMLODIPINE BESYLATE 5 MILLIGRAM(S): 2.5 TABLET ORAL at 12:45

## 2022-08-24 RX ADMIN — GABAPENTIN 400 MILLIGRAM(S): 400 CAPSULE ORAL at 22:03

## 2022-08-24 RX ADMIN — ENOXAPARIN SODIUM 40 MILLIGRAM(S): 100 INJECTION SUBCUTANEOUS at 17:53

## 2022-08-24 RX ADMIN — ONDANSETRON 4 MILLIGRAM(S): 8 TABLET, FILM COATED ORAL at 08:26

## 2022-08-24 RX ADMIN — PIPERACILLIN AND TAZOBACTAM 200 GRAM(S): 4; .5 INJECTION, POWDER, LYOPHILIZED, FOR SOLUTION INTRAVENOUS at 07:54

## 2022-08-24 RX ADMIN — ATORVASTATIN CALCIUM 20 MILLIGRAM(S): 80 TABLET, FILM COATED ORAL at 22:03

## 2022-08-24 RX ADMIN — ONDANSETRON 4 MILLIGRAM(S): 8 TABLET, FILM COATED ORAL at 01:59

## 2022-08-24 RX ADMIN — SODIUM CHLORIDE 1000 MILLILITER(S): 9 INJECTION INTRAMUSCULAR; INTRAVENOUS; SUBCUTANEOUS at 02:02

## 2022-08-24 RX ADMIN — Medication 50 MILLIGRAM(S): at 22:04

## 2022-08-24 RX ADMIN — MORPHINE SULFATE 4 MILLIGRAM(S): 50 CAPSULE, EXTENDED RELEASE ORAL at 01:59

## 2022-08-24 RX ADMIN — GABAPENTIN 400 MILLIGRAM(S): 400 CAPSULE ORAL at 18:46

## 2022-08-24 RX ADMIN — PIPERACILLIN AND TAZOBACTAM 25 GRAM(S): 4; .5 INJECTION, POWDER, LYOPHILIZED, FOR SOLUTION INTRAVENOUS at 18:45

## 2022-08-24 RX ADMIN — SODIUM CHLORIDE 100 MILLILITER(S): 9 INJECTION, SOLUTION INTRAVENOUS at 12:45

## 2022-08-24 NOTE — ED PROVIDER NOTE - ATTENDING CONTRIBUTION TO CARE
58 yo F s/p cholecystectomy on Sunday secondary to cholecystitis still on Augmentin presenting with 2 days of RUQ and epigastric abd pain that radiated to the back that has been getting worse.  Associated with NBNB NV.  No fevers or chills.    Vitals: I have reviewed the patients vital signs  General: nontoxic appearing  HEENT: Atraumatic, normocephalic, airway patent  Eyes: EOMI, tracking appropriately  Neck: no tracheal deviation  Chest/Lungs: no trauma, symmetric chest rise, speaking in complete sentences,  no resp distress  Abd: soft RUQ and epigastric TTP  Heart: skin and extremities well perfused, regular rate and rhythm  Neuro: A+Ox3, ambulating without difficulty, appears non focal  MSK: strength at baseline in all extremities, no muscle wasting or atrophy  Skin: no cyanosis, no jaundice     58 yo with RUQ pain and NV s/p regulo.  Reviewed discharge papers from hospital in [place].  Differential includes but is not limited to post surgical abscess, biliary leak, pancreatitis, retained stone.  Will need cross sectional imaging and labs.  Will control pain aggressively with narcotics.  Will reassess after workup.  May need surgical consult

## 2022-08-24 NOTE — H&P ADULT - ATTENDING COMMENTS
I have reviewed the history, pertinent labs and imaging, and discussed the care with the consult resident.  More than 50% of this 55 minute encounter including face to face with the patient was spent counseling and/or coordination of care on hyperbilirubinemia.  Time included review of vitals, labs, imaging, discussion with consultants and coordination with the operating room/emergency department.    56yo F s/p laparoscopic cholecystectomy (unclear etiology, denies endoscopy prior to or after surgery), in [place], 3d ago. Pt reports pain has been increasing, associated with emesis. Found to have elevated bilirubin 3.4. CT with small collection in the gallbladder fossa. No free air or evidence of abscess. Bile ducts normal on CT. Ddx includes leak vs cbd stone.     - RUQ sono to eval bile ducts   - HIDA to eval for leak (or obstructive choledocholithiasis)  - Trend LFTs  - If HIDA is negative for leak, will obtain MRCP to rule out choledocho    The active issues are:  1. hyperbilirubinemia     The Acute Care Surgery (B Team) Attending Group Practice:  Dr. Melissa Alford    urgent issues - spectra [phone]  nonurgent issues - [phone]  patient appointments or afterhours - [phone]

## 2022-08-24 NOTE — H&P ADULT - ASSESSMENT
Ms. 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 Differential of fluid collection at this time includes normal post operative seroma vs. abscess vs. biloma 2/2 bile leak. Elevated LFTs/T bili could be in the setting of bile leak, although could also be 2/2 choledocholithiasis.     Plan:   -Admit to [facility]   -NPO/IVF  -RUQ US to evaluate for dilation of bile ducts concerning for choledocho   -HIDA to evaluate for bile leak   -IV abx-Zosyn   -Chem VTE ppx   -Home meds   -AM labs     Discussed with Attending Surgeon Dr. Prashanth Ledezma MD PGY2   B Team Surgery   [id number]

## 2022-08-24 NOTE — H&P ADULT - NSHPLABSRESULTS_GEN_ALL_CORE
12.8   8.26  )-----------( 278      ( 24 Aug 2022 02:05 )             40.7   08-24-22 @ 02:05    136  |  96<L>  |  4<L>             --------------------------< 141<H>     3.8  |  28  | 0.54    Calcium: 9.4    AST: 380<H>    ALT: 494<H>  AlkPhos: 375<H>  Protein: 7.5  Albumin: 4.4  TBili: 3.4<H>  D-Bili: -      ACC: [id number] EXAM:  CT ABDOMEN AND PELVIS IC                          PROCEDURE DATE:  08/24/2022          INTERPRETATION:  CLINICAL INFORMATION: 3 days status post cholecystectomy   with abdominal pain, nausea, and vomiting.    COMPARISON: None.    CONTRAST/COMPLICATIONS:  IV Contrast: Omnipaque 350  60 cc administered  Oral Contrast: NONE  Complications: None reported at time of study completion    PROCEDURE:  CT of the Abdomen and Pelvis was performed.  Sagittal and coronal reformats were performed.    FINDINGS:  LOWER CHEST: Bibasilar subsegmental atelectasis. Trace right pleural   effusion.    LIVER: Suggestion of steatosis.  BILE DUCTS: Normal caliber.  GALLBLADDER: Cholecystectomy. Fluid collection without significant rim   enhancement measuring 3.9 x 3.0 cm in the gallbladder fossa with adjacent   inflammatory changes.  SPLEEN: Within normal limits.  PANCREAS: Within normal limits.  ADRENALS: Within normal limits.  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 No   hydronephrosis. Left kidney within normal limits.    BLADDER: Within normal limits.  REPRODUCTIVE ORGANS: Fibroid uterus. Ovaries within normal limits.    BOWEL: Small hiatal hernia. No bowel obstruction or inflammation.   Appendix is normal.  PERITONEUM: No ascites.  VESSELS: Atherosclerotic changes.  RETROPERITONEUM/LYMPH NODES: No lymphadenopathy.  ABDOMINAL WALL: Postsurgical changes in the anterior abdominal wall.  BONES: Mild degenerative changes of the spine.    IMPRESSION:  Status post cholecystectomy with a fluid collection in the gallbladder   fossa which could represent a developing abscess, seroma or biloma.    --- End of Report ---    ZAID RAINES MD; Resident Radiologist  This document has been electronically signed.  ELENA PAYNE MD; Attending Radiologist  This document has been electronically signed. Aug 24 2022  7:20AM

## 2022-08-24 NOTE — ED PROVIDER NOTE - OBJECTIVE STATEMENT
56yo female pt PMHx recent cholecystitis s/p cholecystectomy who presents to ED for 2 days of worsening abdominal pain, nausea and emesis. Denies fevers, chills, diarrhea, or any other concerns. 58yo female pt PMHx recent cholecystitis s/p cholecystectomy who presents to ED for 2 days of worsening RUQ and epigastric abdominal pain that radiates to flank and is a/w nausea and emesis. Denies fevers, chills, diarrhea, or any other concerns.

## 2022-08-24 NOTE — ED ADULT NURSE NOTE - OBJECTIVE STATEMENT
Received the patient in room 19 with c/o abdominal pain , nausea and vomiting. Patient s/p cholecystectomy. Not in acute distress. Safety maintained. 20G IV line inserted in right AC. Due labs sent. Medication given as per order

## 2022-08-24 NOTE — ED PROVIDER NOTE - NSICDXPASTMEDICALHX_GEN_ALL_CORE_FT
PAST MEDICAL HISTORY:  Chronic back pain     Chronic neck pain     HTN (hypertension)     ICH (intracerebral hemorrhage)

## 2022-08-24 NOTE — ED PROVIDER NOTE - CLINICAL SUMMARY MEDICAL DECISION MAKING FREE TEXT BOX
57y female pt post op cholecystectomy who presents to ED with abdominal pain, n/v. TTP over RUQ and epigastrium. Will assess with labs, imaging. Will give fluids, pain meds and reassess 57y female pt post op cholecystectomy who presents to ED with RUQ and epigastric abdominal pain a/w n/v. Who was found to have TTP over RUQ and epigastrium with guarding. Differentials include but are not limited to gallstone pancreatitis, post op complication such as a biliary leak vs gastritis vs retained stone. Will assess with labs and CT scan of A/P. Will give fluids, pain meds and reassess.

## 2022-08-24 NOTE — H&P ADULT - NSHPPHYSICALEXAM_GEN_ALL_CORE
General: NAD  Neuro: A/Ox4  HEENT: NC/AT  Respiratory: RA, no increased work of breathing  CV: RRR  Abdomen: Soft, Non distended, mildly tender to palpation in all 4 quadrants with well healing laparoscopic incisions, no masses palpated, no rebound, guarding, or rigidity  Extremities: WWP, w/o gross deformity, VILLAVICENCIO  Vascular: b/l radial pulses palpable, WWP  Extremities: VILLAVICENCIO spontaneously   Skin: intact, w/o breakdown

## 2022-08-24 NOTE — H&P ADULT - HISTORY OF PRESENT ILLNESS
Ms. 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 Patient denies fevers, chills, diarrhea, lightheadedness, CP, SOB.     In ED, patient is HDS. Labs notable for elevated LFTs, T bili 3.4. CT demonstrates non rim enhancing fluid collection 4x3cm in GB fossa aw surrounding inflammatory changes.

## 2022-08-24 NOTE — ED ADULT NURSE REASSESSMENT NOTE - NS ED NURSE REASSESS COMMENT FT1
patient aaox4. ambulatory. came in with abdominal pain. patient reports mild pain 3/10. not requiring pain meds at this time. pain worse with movement and ambulation. patient denies nausea, vomiting. abdomen soft non tender non distended. patient receiving antibiotics. lr at 100ml/hr. patient NPO except for meds. appears comfortable. medicated as ordered. Will continue to monitor

## 2022-08-24 NOTE — ED PROVIDER NOTE - NS ED ROS FT
CONST: no fevers, no chills  EYES: no pain, no vision changes  ENT: no sore throat, no ear pain, no change in hearing  CV: no chest pain, no leg swelling  RESP: no shortness of breath, no cough  ABD: +abdominal pain, nausea, vomiting  : no dysuria, no flank pain, no hematuria  MSK: no back pain, no extremity pain  NEURO: no headache or additional neurologic complaints  SKIN:  no rash

## 2022-08-24 NOTE — ED PROVIDER NOTE - PHYSICAL EXAMINATION
GENERAL: Awake, alert, uncomfortable 2/2 pain  HEENT: NC/AT, dry mucous membranes   LUNGS: CTAB, no wheezes or crackles   CARDIAC: RRR, no m/r/g  ABDOMEN: Soft, epigastric and RUQ abdominal ttp.   EXT: No edema, no calf tenderness, 2+ PT pulses BL  NEURO: A&Ox3. Moving all extremities. GENERAL: Awake, alert, uncomfortable 2/2 pain  HEENT: NC/AT, dry mucous membranes   LUNGS: CTAB, no wheezes or crackles   CARDIAC: RRR, no m/r/g  ABDOMEN: Soft, epigastric and RUQ abdominal ttp.   EXT: No edema, no calf tenderness, 2+ PT pulses BL  NEURO: A&Ox3. Moving all extremities.  SKIN: warm and dry

## 2022-08-24 NOTE — ED PROVIDER NOTE - NSICDXFAMILYHX_GEN_ALL_CORE_FT
FAMILY HISTORY:  Family history of hypertension  Family history of Parkinson disease    Aunt  Still living? Unknown  Family history of cerebrovascular accident (CVA), Age at diagnosis: Age Unknown

## 2022-08-24 NOTE — ED ADULT NURSE REASSESSMENT NOTE - NS ED NURSE REASSESS COMMENT FT1
Received pt at change of shift, PT is resting in stretcher, easily arousable to verbal stimuli, A+Ox4. pt arrived to ED for abdominal pain, nausea and vomiting. post cholecystectomy, found to have a possible abcess. ABD is soft, very tender, non distended. 20G to RAC, no redness or swelling noted. medicated as per MD. will continue to monitor.

## 2022-08-25 LAB
ALBUMIN SERPL ELPH-MCNC: 3.8 G/DL — SIGNIFICANT CHANGE UP (ref 3.3–5)
ALP SERPL-CCNC: 347 U/L — HIGH (ref 40–120)
ALT FLD-CCNC: 372 U/L — HIGH (ref 4–33)
ANION GAP SERPL CALC-SCNC: 12 MMOL/L — SIGNIFICANT CHANGE UP (ref 7–14)
AST SERPL-CCNC: 237 U/L — HIGH (ref 4–32)
BILIRUB DIRECT SERPL-MCNC: 0.8 MG/DL — HIGH (ref 0–0.3)
BILIRUB INDIRECT FLD-MCNC: 0.8 MG/DL — SIGNIFICANT CHANGE UP (ref 0–1)
BILIRUB SERPL-MCNC: 1.6 MG/DL — HIGH (ref 0.2–1.2)
BUN SERPL-MCNC: 6 MG/DL — LOW (ref 7–23)
CALCIUM SERPL-MCNC: 9 MG/DL — SIGNIFICANT CHANGE UP (ref 8.4–10.5)
CHLORIDE SERPL-SCNC: 99 MMOL/L — SIGNIFICANT CHANGE UP (ref 98–107)
CO2 SERPL-SCNC: 27 MMOL/L — SIGNIFICANT CHANGE UP (ref 22–31)
CREAT SERPL-MCNC: 0.66 MG/DL — SIGNIFICANT CHANGE UP (ref 0.5–1.3)
EGFR: 102 ML/MIN/1.73M2 — SIGNIFICANT CHANGE UP
GLUCOSE SERPL-MCNC: 96 MG/DL — SIGNIFICANT CHANGE UP (ref 70–99)
HCT VFR BLD CALC: 40 % — SIGNIFICANT CHANGE UP (ref 34.5–45)
HCV AB S/CO SERPL IA: 0.08 S/CO — SIGNIFICANT CHANGE UP (ref 0–0.99)
HCV AB SERPL-IMP: SIGNIFICANT CHANGE UP
HGB BLD-MCNC: 13 G/DL — SIGNIFICANT CHANGE UP (ref 11.5–15.5)
MAGNESIUM SERPL-MCNC: 1.9 MG/DL — SIGNIFICANT CHANGE UP (ref 1.6–2.6)
MCHC RBC-ENTMCNC: 27.8 PG — SIGNIFICANT CHANGE UP (ref 27–34)
MCHC RBC-ENTMCNC: 32.5 GM/DL — SIGNIFICANT CHANGE UP (ref 32–36)
MCV RBC AUTO: 85.7 FL — SIGNIFICANT CHANGE UP (ref 80–100)
NRBC # BLD: 0 /100 WBCS — SIGNIFICANT CHANGE UP (ref 0–0)
NRBC # FLD: 0.02 K/UL — HIGH (ref 0–0)
PHOSPHATE SERPL-MCNC: 3 MG/DL — SIGNIFICANT CHANGE UP (ref 2.5–4.5)
PLATELET # BLD AUTO: 289 K/UL — SIGNIFICANT CHANGE UP (ref 150–400)
POTASSIUM SERPL-MCNC: 3.3 MMOL/L — LOW (ref 3.5–5.3)
POTASSIUM SERPL-SCNC: 3.3 MMOL/L — LOW (ref 3.5–5.3)
PROT SERPL-MCNC: 6.8 G/DL — SIGNIFICANT CHANGE UP (ref 6–8.3)
RBC # BLD: 4.67 M/UL — SIGNIFICANT CHANGE UP (ref 3.8–5.2)
RBC # FLD: 14.4 % — SIGNIFICANT CHANGE UP (ref 10.3–14.5)
SODIUM SERPL-SCNC: 138 MMOL/L — SIGNIFICANT CHANGE UP (ref 135–145)
WBC # BLD: 8.16 K/UL — SIGNIFICANT CHANGE UP (ref 3.8–10.5)
WBC # FLD AUTO: 8.16 K/UL — SIGNIFICANT CHANGE UP (ref 3.8–10.5)

## 2022-08-25 PROCEDURE — 99231 SBSQ HOSP IP/OBS SF/LOW 25: CPT | Mod: GC

## 2022-08-25 RX ORDER — ACETAMINOPHEN 500 MG
650 TABLET ORAL EVERY 6 HOURS
Refills: 0 | Status: DISCONTINUED | OUTPATIENT
Start: 2022-08-25 | End: 2022-08-28

## 2022-08-25 RX ORDER — MAGNESIUM SULFATE 500 MG/ML
2 VIAL (ML) INJECTION ONCE
Refills: 0 | Status: COMPLETED | OUTPATIENT
Start: 2022-08-25 | End: 2022-08-25

## 2022-08-25 RX ORDER — SODIUM CHLORIDE 9 MG/ML
1000 INJECTION, SOLUTION INTRAVENOUS
Refills: 0 | Status: DISCONTINUED | OUTPATIENT
Start: 2022-08-25 | End: 2022-08-27

## 2022-08-25 RX ORDER — POTASSIUM CHLORIDE 20 MEQ
40 PACKET (EA) ORAL ONCE
Refills: 0 | Status: COMPLETED | OUTPATIENT
Start: 2022-08-25 | End: 2022-08-25

## 2022-08-25 RX ORDER — ACETAMINOPHEN 500 MG
1000 TABLET ORAL EVERY 6 HOURS
Refills: 0 | Status: COMPLETED | OUTPATIENT
Start: 2022-08-25 | End: 2022-08-25

## 2022-08-25 RX ADMIN — Medication 1000 MILLIGRAM(S): at 14:00

## 2022-08-25 RX ADMIN — Medication 40 MILLIEQUIVALENT(S): at 09:30

## 2022-08-25 RX ADMIN — Medication 25 MICROGRAM(S): at 06:31

## 2022-08-25 RX ADMIN — PIPERACILLIN AND TAZOBACTAM 25 GRAM(S): 4; .5 INJECTION, POWDER, LYOPHILIZED, FOR SOLUTION INTRAVENOUS at 01:55

## 2022-08-25 RX ADMIN — SODIUM CHLORIDE 100 MILLILITER(S): 9 INJECTION, SOLUTION INTRAVENOUS at 06:32

## 2022-08-25 RX ADMIN — PIPERACILLIN AND TAZOBACTAM 25 GRAM(S): 4; .5 INJECTION, POWDER, LYOPHILIZED, FOR SOLUTION INTRAVENOUS at 09:30

## 2022-08-25 RX ADMIN — Medication 400 MILLIGRAM(S): at 13:35

## 2022-08-25 RX ADMIN — PIPERACILLIN AND TAZOBACTAM 25 GRAM(S): 4; .5 INJECTION, POWDER, LYOPHILIZED, FOR SOLUTION INTRAVENOUS at 18:27

## 2022-08-25 RX ADMIN — GABAPENTIN 400 MILLIGRAM(S): 400 CAPSULE ORAL at 13:36

## 2022-08-25 RX ADMIN — AMLODIPINE BESYLATE 5 MILLIGRAM(S): 2.5 TABLET ORAL at 06:32

## 2022-08-25 RX ADMIN — Medication 50 MILLIGRAM(S): at 22:45

## 2022-08-25 RX ADMIN — GABAPENTIN 400 MILLIGRAM(S): 400 CAPSULE ORAL at 22:45

## 2022-08-25 RX ADMIN — ATORVASTATIN CALCIUM 20 MILLIGRAM(S): 80 TABLET, FILM COATED ORAL at 22:45

## 2022-08-25 RX ADMIN — GABAPENTIN 400 MILLIGRAM(S): 400 CAPSULE ORAL at 06:31

## 2022-08-25 RX ADMIN — Medication 25 GRAM(S): at 09:31

## 2022-08-25 RX ADMIN — Medication 650 MILLIGRAM(S): at 18:28

## 2022-08-25 RX ADMIN — SODIUM CHLORIDE 100 MILLILITER(S): 9 INJECTION, SOLUTION INTRAVENOUS at 01:55

## 2022-08-25 NOTE — PROGRESS NOTE ADULT - ASSESSMENT
Ms. 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 Differential of fluid collection at this time includes normal post operative seroma vs. abscess vs. biloma 2/2 bile leak. HIDA negative for bile leak, MRCP ordered for r/o choledocholithiasis.     Plan:     -NPO/IVF  -MRCP  -IV abx-Zosyn   -Chem VTE ppx   -Home meds   -AM labs     B Team Surgery   [id number] Ms. 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 Differential of fluid collection at this time includes normal post operative seroma vs. abscess vs. biloma 2/2 bile leak. HIDA negative for bile leak, MRCP ordered for r/o choledocholithiasis.     Plan:       -NPO/IVF  -MRCP  -IR consult for gallbladder collection  -IV abx-Zosyn   -Chem VTE ppx   -Home meds   -AM labs     B Team Surgery   [id number]

## 2022-08-25 NOTE — PROGRESS NOTE ADULT - SUBJECTIVE AND OBJECTIVE BOX
No overnight events TEAM B Surgery Daily Progress Note  =====================================================    SUBJECTIVE: Patient seen and examined at bedside on AM rounds.    PMH:  Ms. 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 Patient denies fevers, chills, diarrhea, lightheadedness, CP, SOB.     In ED, patient is HDS. Labs notable for elevated LFTs, T bili 3.4. CT demonstrates non rim enhancing fluid collection 4x3cm in GB fossa aw surrounding inflammatory changes.       ALLERGIES:  No Known Allergies      --------------------------------------------------------------------------------------    MEDICATIONS:    Neurologic Medications  amitriptyline 50 milliGRAM(s) Oral at bedtime  gabapentin 400 milliGRAM(s) Oral three times a day    Respiratory Medications    Cardiovascular Medications  amLODIPine   Tablet 5 milliGRAM(s) Oral daily    Gastrointestinal Medications  dextrose 5% + lactated ringers. 1000 milliLiter(s) IV Continuous <Continuous>  magnesium sulfate  IVPB 2 Gram(s) IV Intermittent once  potassium chloride    Tablet ER 40 milliEquivalent(s) Oral once    Genitourinary Medications    Hematologic/Oncologic Medications  enoxaparin Injectable 40 milliGRAM(s) SubCutaneous every 24 hours    Antimicrobial/Immunologic Medications  piperacillin/tazobactam IVPB.. 3.375 Gram(s) IV Intermittent every 8 hours    Endocrine/Metabolic Medications  atorvastatin 20 milliGRAM(s) Oral at bedtime  levothyroxine 25 MICROGram(s) Oral daily    Topical/Other Medications    --------------------------------------------------------------------------------------    VITAL SIGNS:  Vital Signs Last 24 Hrs  T(C): 37.4 (25 Aug 2022 06:29), Max: 37.4 (25 Aug 2022 06:29)  T(F): 99.3 (25 Aug 2022 06:29), Max: 99.3 (25 Aug 2022 06:29)  HR: 61 (25 Aug 2022 06:29) (61 - 96)  BP: 144/87 (25 Aug 2022 06:29) (123/75 - 148/87)  BP(mean): --  RR: 18 (25 Aug 2022 06:29) (16 - 18)  SpO2: 95% (25 Aug 2022 06:29) (91% - 98%)    Parameters below as of 25 Aug 2022 06:29  Patient On (Oxygen Delivery Method): room air      I&O's Detail    --------------------------------------------------------------------------------------    EXAM    General: NAD, resting in bed comfortably.  Cardiac: regular rate, warm and well perfused  Respiratory: Nonlabored respirations, normal cw expansion.  Abdomen: Soft, Non distended, mildly tender to palpation in all 4 quadrants with well healing laparoscopic incisions.  Extremities: normal strength, FROM, no deformities    --------------------------------------------------------------------------------------    LABS                          13.0   8.16  )-----------( 289      ( 25 Aug 2022 07:20 )             40.0     08-25    138  |  99  |  6<L>  ----------------------------<  96  3.3<L>   |  27  |  0.66    Ca    9.0      25 Aug 2022 07:20  Phos  3.0     08-25  Mg     1.90     08-25    TPro  6.8  /  Alb  3.8  /  TBili  1.6<H>  /  DBili  0.8<H>  /  AST  237<H>  /  ALT  372<H>  /  AlkPhos  347<H>  08-25    --------------------------------------------------------------------------------------

## 2022-08-25 NOTE — CONSULT NOTE ADULT - ASSESSMENT
Interventional Radiology    Evaluate for Procedure: Gallbladder fossa collection drainage    HPI: 57y Female sp lap regulo 8/21 2/2 acute cholecystitis presented with N/V and abdominal pain. CT abd with 3.9 cm collection in gallbladder fossa. HIDA negative for leak. IR consulted for drainage.     Allergies:   Medications (Abx/Cardiac/Anticoagulation/Blood Products)  amLODIPine   Tablet: 5 milliGRAM(s) Oral (08-25 @ 06:32)  enoxaparin Injectable: 40 milliGRAM(s) SubCutaneous (08-24 @ 17:53)  piperacillin/tazobactam IVPB..: 25 mL/Hr IV Intermittent (08-25 @ 09:30)  piperacillin/tazobactam IVPB...: 200 mL/Hr IV Intermittent (08-24 @ 07:54)    Data:    81  T(C): 36.9  HR: 65  BP: 131/74  RR: 18  SpO2: 95%    -WBC 8.16 / HgB 13.0 / Hct 40.0 / Plt 289  -Na 138 / Cl 99 / BUN 6 / Glucose 96  -K 3.3 / CO2 27 / Cr 0.66  - / Alk Phos 347 / T.Bili 1.6  -INR 0.97 / PTT 31.9      Radiology:   Imaging reviewed    Assessment/Plan:   57y Female sp lap regulo 8/21 2/2 acute cholecystitis presented with N/V and abdominal pain. CT abd with 3.9 cm collection in gallbladder fossa. HIDA negative for leak. IR consulted for drainage.     -- IR will plan to perform gallbladder fossa collection drainage 8/26.  -- NPO at midnight on 8/25  -- Morning labs 8/26  -- hold a.m. anticoagulation on 8/26  -- please place IR procedure request order under Dr. Mcdonnell

## 2022-08-26 LAB
ALBUMIN SERPL ELPH-MCNC: 3.6 G/DL — SIGNIFICANT CHANGE UP (ref 3.3–5)
ALP SERPL-CCNC: 271 U/L — HIGH (ref 40–120)
ALT FLD-CCNC: 273 U/L — HIGH (ref 4–33)
ANION GAP SERPL CALC-SCNC: 9 MMOL/L — SIGNIFICANT CHANGE UP (ref 7–14)
APTT BLD: 32.6 SEC — SIGNIFICANT CHANGE UP (ref 27–36.3)
AST SERPL-CCNC: 136 U/L — HIGH (ref 4–32)
BILIRUB DIRECT SERPL-MCNC: 0.7 MG/DL — HIGH (ref 0–0.3)
BILIRUB SERPL-MCNC: 1 MG/DL — SIGNIFICANT CHANGE UP (ref 0.2–1.2)
BILIRUB SERPL-MCNC: 1.6 MG/DL — HIGH (ref 0.2–1.2)
BLD GP AB SCN SERPL QL: NEGATIVE — SIGNIFICANT CHANGE UP
BUN SERPL-MCNC: 4 MG/DL — LOW (ref 7–23)
CALCIUM SERPL-MCNC: 8.8 MG/DL — SIGNIFICANT CHANGE UP (ref 8.4–10.5)
CHLORIDE SERPL-SCNC: 102 MMOL/L — SIGNIFICANT CHANGE UP (ref 98–107)
CO2 SERPL-SCNC: 29 MMOL/L — SIGNIFICANT CHANGE UP (ref 22–31)
CREAT SERPL-MCNC: 0.61 MG/DL — SIGNIFICANT CHANGE UP (ref 0.5–1.3)
EGFR: 104 ML/MIN/1.73M2 — SIGNIFICANT CHANGE UP
GLUCOSE SERPL-MCNC: 147 MG/DL — HIGH (ref 70–99)
HCT VFR BLD CALC: 36.9 % — SIGNIFICANT CHANGE UP (ref 34.5–45)
HGB BLD-MCNC: 11.7 G/DL — SIGNIFICANT CHANGE UP (ref 11.5–15.5)
INR BLD: 1.05 RATIO — SIGNIFICANT CHANGE UP (ref 0.88–1.16)
MAGNESIUM SERPL-MCNC: 2.1 MG/DL — SIGNIFICANT CHANGE UP (ref 1.6–2.6)
MCHC RBC-ENTMCNC: 27.5 PG — SIGNIFICANT CHANGE UP (ref 27–34)
MCHC RBC-ENTMCNC: 31.7 GM/DL — LOW (ref 32–36)
MCV RBC AUTO: 86.6 FL — SIGNIFICANT CHANGE UP (ref 80–100)
NRBC # BLD: 0 /100 WBCS — SIGNIFICANT CHANGE UP (ref 0–0)
NRBC # FLD: 0 K/UL — SIGNIFICANT CHANGE UP (ref 0–0)
PHOSPHATE SERPL-MCNC: 2.9 MG/DL — SIGNIFICANT CHANGE UP (ref 2.5–4.5)
PLATELET # BLD AUTO: 254 K/UL — SIGNIFICANT CHANGE UP (ref 150–400)
POTASSIUM SERPL-MCNC: 3 MMOL/L — LOW (ref 3.5–5.3)
POTASSIUM SERPL-SCNC: 3 MMOL/L — LOW (ref 3.5–5.3)
PROT SERPL-MCNC: 6.1 G/DL — SIGNIFICANT CHANGE UP (ref 6–8.3)
PROTHROM AB SERPL-ACNC: 12.2 SEC — SIGNIFICANT CHANGE UP (ref 10.5–13.4)
RBC # BLD: 4.26 M/UL — SIGNIFICANT CHANGE UP (ref 3.8–5.2)
RBC # FLD: 14.7 % — HIGH (ref 10.3–14.5)
RH IG SCN BLD-IMP: POSITIVE — SIGNIFICANT CHANGE UP
SODIUM SERPL-SCNC: 140 MMOL/L — SIGNIFICANT CHANGE UP (ref 135–145)
WBC # BLD: 6.66 K/UL — SIGNIFICANT CHANGE UP (ref 3.8–10.5)
WBC # FLD AUTO: 6.66 K/UL — SIGNIFICANT CHANGE UP (ref 3.8–10.5)

## 2022-08-26 PROCEDURE — 49406 IMAGE CATH FLUID PERI/RETRO: CPT

## 2022-08-26 PROCEDURE — 99231 SBSQ HOSP IP/OBS SF/LOW 25: CPT | Mod: GC

## 2022-08-26 RX ORDER — DIVALPROEX SODIUM 500 MG/1
250 TABLET, DELAYED RELEASE ORAL
Refills: 0 | Status: DISCONTINUED | OUTPATIENT
Start: 2022-08-26 | End: 2022-08-26

## 2022-08-26 RX ORDER — ENOXAPARIN SODIUM 100 MG/ML
40 INJECTION SUBCUTANEOUS EVERY 24 HOURS
Refills: 0 | Status: DISCONTINUED | OUTPATIENT
Start: 2022-08-26 | End: 2022-08-28

## 2022-08-26 RX ORDER — POTASSIUM CHLORIDE 20 MEQ
10 PACKET (EA) ORAL
Refills: 0 | Status: COMPLETED | OUTPATIENT
Start: 2022-08-26 | End: 2022-08-26

## 2022-08-26 RX ADMIN — AMLODIPINE BESYLATE 5 MILLIGRAM(S): 2.5 TABLET ORAL at 07:26

## 2022-08-26 RX ADMIN — Medication 650 MILLIGRAM(S): at 07:26

## 2022-08-26 RX ADMIN — Medication 100 MILLIEQUIVALENT(S): at 11:47

## 2022-08-26 RX ADMIN — PIPERACILLIN AND TAZOBACTAM 25 GRAM(S): 4; .5 INJECTION, POWDER, LYOPHILIZED, FOR SOLUTION INTRAVENOUS at 09:22

## 2022-08-26 RX ADMIN — Medication 100 MILLIEQUIVALENT(S): at 09:21

## 2022-08-26 RX ADMIN — Medication 650 MILLIGRAM(S): at 12:40

## 2022-08-26 RX ADMIN — SODIUM CHLORIDE 50 MILLILITER(S): 9 INJECTION, SOLUTION INTRAVENOUS at 17:54

## 2022-08-26 RX ADMIN — Medication 650 MILLIGRAM(S): at 02:32

## 2022-08-26 RX ADMIN — Medication 650 MILLIGRAM(S): at 01:22

## 2022-08-26 RX ADMIN — GABAPENTIN 400 MILLIGRAM(S): 400 CAPSULE ORAL at 07:26

## 2022-08-26 RX ADMIN — Medication 100 MILLIEQUIVALENT(S): at 10:27

## 2022-08-26 RX ADMIN — Medication 650 MILLIGRAM(S): at 23:14

## 2022-08-26 RX ADMIN — GABAPENTIN 400 MILLIGRAM(S): 400 CAPSULE ORAL at 13:49

## 2022-08-26 RX ADMIN — PIPERACILLIN AND TAZOBACTAM 25 GRAM(S): 4; .5 INJECTION, POWDER, LYOPHILIZED, FOR SOLUTION INTRAVENOUS at 01:23

## 2022-08-26 RX ADMIN — Medication 25 MICROGRAM(S): at 07:26

## 2022-08-26 RX ADMIN — Medication 650 MILLIGRAM(S): at 17:54

## 2022-08-26 RX ADMIN — Medication 650 MILLIGRAM(S): at 23:22

## 2022-08-26 RX ADMIN — GABAPENTIN 400 MILLIGRAM(S): 400 CAPSULE ORAL at 22:17

## 2022-08-26 RX ADMIN — Medication 650 MILLIGRAM(S): at 07:57

## 2022-08-26 RX ADMIN — ATORVASTATIN CALCIUM 20 MILLIGRAM(S): 80 TABLET, FILM COATED ORAL at 22:18

## 2022-08-26 RX ADMIN — Medication 50 MILLIGRAM(S): at 22:17

## 2022-08-26 RX ADMIN — PIPERACILLIN AND TAZOBACTAM 25 GRAM(S): 4; .5 INJECTION, POWDER, LYOPHILIZED, FOR SOLUTION INTRAVENOUS at 17:51

## 2022-08-26 NOTE — PROCEDURE NOTE - PROCEDURE FINDINGS AND DETAILS
Successful Gallbladder Fossa Collection Drainage with placement of an 8.5F Elise-Soto and aspiration of 15 mL of levy colored clear fluid. Specimen sent for culture and bilirubin.

## 2022-08-26 NOTE — PROGRESS NOTE ADULT - ASSESSMENT
Ms. 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 Differential of fluid collection at this time includes normal post operative seroma vs. abscess vs. biloma 2/2 bile leak. HIDA negative for bile leak, MRCP ordered for r/o choledocholithiasis.     Plan:   -NPO/IVF  -MRCP  -IR to drain gallbladder collection today  -IV abx-Zosyn   -Chem VTE ppx   -Home meds   -AM labs     B Team Surgery   [id number]

## 2022-08-26 NOTE — CHART NOTE - NSCHARTNOTEFT_GEN_A_CORE
POST PROCEDURE CHECK      PROCEDURE: S/P IR drainage of collection      SUBJECTIVE: Patient seen and examined at the bedside.  Does not endorse any complaints at this time.  Denies dizziness, headache, chest pain, palpitations, shortness of breath, abdominal pain, nausea, vomiting and diarrhea.      OBJECTIVE:    VITALS:  T(F): 98 (08-26-22 @ 12:22), Max: 98.6 (08-25-22 @ 18:08)  HR: 55 (08-26-22 @ 12:22)  BP: 126/81 (08-26-22 @ 12:22) (126/81 - 150/89)  RR: 18 (08-26-22 @ 12:22)  SpO2: 98% (08-26-22 @ 12:22)        PHYSICAL EXAM:  GENERAL: Laying down, in no acute distress.  RESPIRATORY: Normal expansion / effort.  CARDIOVASCULAR: +S1 / S2.  Regular rate and rhythm.  ABDOMEN: Soft, Nontender, Nondistended.  IR drain in right upper quadrant with serosanguinous output.  EXTREMITIES: Moving all extremities spontaneously.      MEDICATIONS:  acetaminophen     Tablet .. 650 milliGRAM(s) Oral every 6 hours  amitriptyline 50 milliGRAM(s) Oral at bedtime  amLODIPine   Tablet 5 milliGRAM(s) Oral daily  atorvastatin 20 milliGRAM(s) Oral at bedtime  dextrose 5% + lactated ringers. 1000 milliLiter(s) IV Continuous <Continuous>  enoxaparin Injectable 40 milliGRAM(s) SubCutaneous every 24 hours  gabapentin 400 milliGRAM(s) Oral three times a day  levothyroxine 25 MICROGram(s) Oral daily  piperacillin/tazobactam IVPB.. 3.375 Gram(s) IV Intermittent every 8 hours      LABS:    CBC:                      11.7   6.66  )-----------( 254      ( 26 Aug 2022 06:45 )             36.9     CHEM:  140  |  102  |  4<L>  ----------------------------<  147<H>  3.0<L>   |  29  |  0.61    Ca    8.8      26 Aug 2022 06:45  Phos  2.9     08-26  Mg     2.10     08-26    TPro  x   /  Alb  x   /  TBili  1.6<H>  /  DBili  0.7<H>  /  AST  x   /  ALT  x   /  AlkPhos  x   08-26    COAGULATION PANEL:  PT/INR - ( 26 Aug 2022 06:45 )   PT: 12.2 sec;   INR: 1.05 ratio    PTT - ( 26 Aug 2022 06:45 )  PTT:32.6 sec      ASSESSMENT:  Patient is a 57 year old female with a PMHx of HTN, HLD, hypothyroidism, intracranial hemorrhage (2016 with residual neurological deficits) and PSHx of laparoscopic cholecystectomy on 8/21/22 who presented with nausea, vomiting and abdominal pain beginning on POD2.  CT Abdomen / Pelvis performed showing status post cholecystectomy with a fluid collection in the gallbladder fossa which could represent a developing abscess, seroma or biloma.  Patient is now S/P IR drainage of collection on 8/26/22.      PLAN:  - Advance to clear liquid diet  - D5 + LR @50cc/hr  - Monitor drain output  - Out of bed  - Pain control  - VTE prophylaxis with Lovenox subcutaneous      #[id number]  B Team Surgery

## 2022-08-26 NOTE — PROGRESS NOTE ADULT - SUBJECTIVE AND OBJECTIVE BOX
No overnight events TEAM Surgery Progress Note  Patient is a 57y old  Female who presents with a chief complaint of Fluid Collection in GB fossa (26 Aug 2022 04:11)      INTERVAL EVENTS: No acute events overnight.    SUBJECTIVE: Patient seen and examined at bedside with surgical team, patient without complaints. Denies nausea, vomiting, abdominal pain.    REVIEW OF SYSTEMS:  Constitutional: No fevers or chills. No malaise or weakness.  Respiratory: No cough, wheezing, or SOB. No hemoptysis.  Cardiovascular: No chest pain or palpitations.  Gastrointestinal: No nausea, vomiting, diarrhea or constipation.   Genitourinary: No dysuria, hematuria, or frequency.  Skin: No rashes or pruritus.     OBJECTIVE:    Vital Signs Last 24 Hrs  T(C): 37 (26 Aug 2022 01:42), Max: 37 (25 Aug 2022 18:08)  T(F): 98.6 (26 Aug 2022 01:42), Max: 98.6 (25 Aug 2022 18:08)  HR: 61 (26 Aug 2022 01:42) (61 - 65)  BP: 150/89 (26 Aug 2022 01:42) (131/74 - 150/89)  BP(mean): --  RR: 18 (26 Aug 2022 01:42) (18 - 18)  SpO2: 100% (26 Aug 2022 01:42) (95% - 100%)    Parameters below as of 26 Aug 2022 07:17  Patient On (Oxygen Delivery Method): room air    I&O's Detail  MEDICATIONS  (STANDING):  acetaminophen     Tablet .. 650 milliGRAM(s) Oral every 6 hours  amitriptyline 50 milliGRAM(s) Oral at bedtime  amLODIPine   Tablet 5 milliGRAM(s) Oral daily  atorvastatin 20 milliGRAM(s) Oral at bedtime  dextrose 5% + lactated ringers. 1000 milliLiter(s) (100 mL/Hr) IV Continuous <Continuous>  gabapentin 400 milliGRAM(s) Oral three times a day  levothyroxine 25 MICROGram(s) Oral daily  piperacillin/tazobactam IVPB.. 3.375 Gram(s) IV Intermittent every 8 hours    MEDICATIONS  (PRN):      PHYSICAL EXAM:  Constitutional: A&Ox3, NAD  Respiratory: Unlabored breathing  Abdomen: Soft, nondistended, NTTP.   Extremities: WWP, VILLAVICENCIO spontaneously    LABS:                        11.7   6.66  )-----------( 254      ( 26 Aug 2022 06:45 )             36.9     08-26    140  |  102  |  4<L>  ----------------------------<  147<H>  3.0<L>   |  29  |  0.61    Ca    8.8      26 Aug 2022 06:45  Phos  2.9     08-26  Mg     2.10     08-26    TPro  6.1  /  Alb  3.6  /  TBili  1.0  /  DBili  x   /  AST  136<H>  /  ALT  273<H>  /  AlkPhos  271<H>  08-26    PT/INR - ( 26 Aug 2022 06:45 )   PT: 12.2 sec;   INR: 1.05 ratio         PTT - ( 26 Aug 2022 06:45 )  PTT:32.6 sec  LIVER FUNCTIONS - ( 26 Aug 2022 06:45 )  Alb: 3.6 g/dL / Pro: 6.1 g/dL / ALK PHOS: 271 U/L / ALT: 273 U/L / AST: 136 U/L / GGT: x             ABO Interpretation: A (08-26-22 @ 07:04)

## 2022-08-26 NOTE — CHART NOTE - NSCHARTNOTEFT_GEN_A_CORE
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 Differential of fluid collection at this time includes normal post operative seroma vs. abscess. HIDA negative for bile leak.      Pertinent labs:                      13.0   8.16  )-----------( 289      ( 25 Aug 2022 07:20 )             40.0       08-25    138  |  99  |  6<L>  ----------------------------<  96  3.3<L>   |  27  |  0.66    Ca    9.0      25 Aug 2022 07:20  Phos  3.0     08-25  Mg     1.90     08-25    TPro  6.8  /  Alb  3.8  /  TBili  1.6<H>  /  DBili  0.8<H>  /  AST  237<H>  /  ALT  372<H>  /  AlkPhos  347<H>  08-25              Patient and Family Aware ? Yes

## 2022-08-26 NOTE — PRE PROCEDURE NOTE - PRE PROCEDURE EVALUATION
Procedure: Gallbladder Fossa Collection Drainage    Indication: Gallbladder Fossa Collection      Clinical History: 57y Female sp lap regulo 8/21 2/2 acute cholecystitis presented with N/V and abdominal pain. CT abd with 3.9 cm collection in gallbladder fossa. HIDA negative for leak. IR consulted for drainage.    Meds:acetaminophen     Tablet .. 650 milliGRAM(s) Oral every 6 hours  amitriptyline 50 milliGRAM(s) Oral at bedtime  amLODIPine   Tablet 5 milliGRAM(s) Oral daily  atorvastatin 20 milliGRAM(s) Oral at bedtime  dextrose 5% + lactated ringers. 1000 milliLiter(s) IV Continuous <Continuous>  gabapentin 400 milliGRAM(s) Oral three times a day  levothyroxine 25 MICROGram(s) Oral daily  piperacillin/tazobactam IVPB.. 3.375 Gram(s) IV Intermittent every 8 hours  potassium chloride  10 mEq/100 mL IVPB 10 milliEquivalent(s) IV Intermittent every 1 hour      Allergies:No Known Allergies        Labs:                           11.7   6.66  )-----------( 254      ( 26 Aug 2022 06:45 )             36.9     PT/INR - ( 26 Aug 2022 06:45 )   PT: 12.2 sec;   INR: 1.05 ratio         PTT - ( 26 Aug 2022 06:45 )  PTT:32.6 sec  08-26    140  |  102  |  4<L>  ----------------------------<  147<H>  3.0<L>   |  29  |  0.61    Ca    8.8      26 Aug 2022 06:45  Phos  2.9     08-26  Mg     2.10     08-26    TPro  6.1  /  Alb  3.6  /  TBili  1.0  /  DBili  x   /  AST  136<H>  /  ALT  273<H>  /  AlkPhos  271<H>  08-26        Physical Exam: NAD      Anesthesia: Sedation by Attending Interventional Radiologist    ASA: 2    NPO: Appropriate

## 2022-08-27 LAB
ALBUMIN SERPL ELPH-MCNC: 3.7 G/DL — SIGNIFICANT CHANGE UP (ref 3.3–5)
ALP SERPL-CCNC: 262 U/L — HIGH (ref 40–120)
ALT FLD-CCNC: 227 U/L — HIGH (ref 4–33)
ANION GAP SERPL CALC-SCNC: 10 MMOL/L — SIGNIFICANT CHANGE UP (ref 7–14)
AST SERPL-CCNC: 93 U/L — HIGH (ref 4–32)
BILIRUB SERPL-MCNC: 0.8 MG/DL — SIGNIFICANT CHANGE UP (ref 0.2–1.2)
BUN SERPL-MCNC: 4 MG/DL — LOW (ref 7–23)
CALCIUM SERPL-MCNC: 9.3 MG/DL — SIGNIFICANT CHANGE UP (ref 8.4–10.5)
CHLORIDE SERPL-SCNC: 100 MMOL/L — SIGNIFICANT CHANGE UP (ref 98–107)
CO2 SERPL-SCNC: 29 MMOL/L — SIGNIFICANT CHANGE UP (ref 22–31)
CREAT SERPL-MCNC: 0.67 MG/DL — SIGNIFICANT CHANGE UP (ref 0.5–1.3)
EGFR: 102 ML/MIN/1.73M2 — SIGNIFICANT CHANGE UP
GLUCOSE SERPL-MCNC: 98 MG/DL — SIGNIFICANT CHANGE UP (ref 70–99)
GRAM STN FLD: SIGNIFICANT CHANGE UP
HCT VFR BLD CALC: 39.4 % — SIGNIFICANT CHANGE UP (ref 34.5–45)
HGB BLD-MCNC: 12.5 G/DL — SIGNIFICANT CHANGE UP (ref 11.5–15.5)
MAGNESIUM SERPL-MCNC: 2 MG/DL — SIGNIFICANT CHANGE UP (ref 1.6–2.6)
MCHC RBC-ENTMCNC: 27.7 PG — SIGNIFICANT CHANGE UP (ref 27–34)
MCHC RBC-ENTMCNC: 31.7 GM/DL — LOW (ref 32–36)
MCV RBC AUTO: 87.4 FL — SIGNIFICANT CHANGE UP (ref 80–100)
NRBC # BLD: 0 /100 WBCS — SIGNIFICANT CHANGE UP (ref 0–0)
NRBC # FLD: 0 K/UL — SIGNIFICANT CHANGE UP (ref 0–0)
PHOSPHATE SERPL-MCNC: 4.2 MG/DL — SIGNIFICANT CHANGE UP (ref 2.5–4.5)
PLATELET # BLD AUTO: 284 K/UL — SIGNIFICANT CHANGE UP (ref 150–400)
POTASSIUM SERPL-MCNC: 3.6 MMOL/L — SIGNIFICANT CHANGE UP (ref 3.5–5.3)
POTASSIUM SERPL-SCNC: 3.6 MMOL/L — SIGNIFICANT CHANGE UP (ref 3.5–5.3)
PROT SERPL-MCNC: 6.7 G/DL — SIGNIFICANT CHANGE UP (ref 6–8.3)
RBC # BLD: 4.51 M/UL — SIGNIFICANT CHANGE UP (ref 3.8–5.2)
RBC # FLD: 14.6 % — HIGH (ref 10.3–14.5)
SODIUM SERPL-SCNC: 139 MMOL/L — SIGNIFICANT CHANGE UP (ref 135–145)
SPECIMEN SOURCE: SIGNIFICANT CHANGE UP
WBC # BLD: 7.1 K/UL — SIGNIFICANT CHANGE UP (ref 3.8–10.5)
WBC # FLD AUTO: 7.1 K/UL — SIGNIFICANT CHANGE UP (ref 3.8–10.5)

## 2022-08-27 PROCEDURE — 74183 MRI ABD W/O CNTR FLWD CNTR: CPT | Mod: 26

## 2022-08-27 PROCEDURE — 99231 SBSQ HOSP IP/OBS SF/LOW 25: CPT

## 2022-08-27 RX ORDER — POTASSIUM CHLORIDE 20 MEQ
20 PACKET (EA) ORAL ONCE
Refills: 0 | Status: COMPLETED | OUTPATIENT
Start: 2022-08-27 | End: 2022-08-27

## 2022-08-27 RX ADMIN — SODIUM CHLORIDE 50 MILLILITER(S): 9 INJECTION, SOLUTION INTRAVENOUS at 06:19

## 2022-08-27 RX ADMIN — PIPERACILLIN AND TAZOBACTAM 25 GRAM(S): 4; .5 INJECTION, POWDER, LYOPHILIZED, FOR SOLUTION INTRAVENOUS at 11:00

## 2022-08-27 RX ADMIN — Medication 650 MILLIGRAM(S): at 06:19

## 2022-08-27 RX ADMIN — ATORVASTATIN CALCIUM 20 MILLIGRAM(S): 80 TABLET, FILM COATED ORAL at 22:01

## 2022-08-27 RX ADMIN — GABAPENTIN 400 MILLIGRAM(S): 400 CAPSULE ORAL at 06:20

## 2022-08-27 RX ADMIN — PIPERACILLIN AND TAZOBACTAM 25 GRAM(S): 4; .5 INJECTION, POWDER, LYOPHILIZED, FOR SOLUTION INTRAVENOUS at 01:58

## 2022-08-27 RX ADMIN — Medication 650 MILLIGRAM(S): at 22:00

## 2022-08-27 RX ADMIN — Medication 20 MILLIEQUIVALENT(S): at 18:55

## 2022-08-27 RX ADMIN — Medication 650 MILLIGRAM(S): at 23:00

## 2022-08-27 RX ADMIN — Medication 50 MILLIGRAM(S): at 22:01

## 2022-08-27 RX ADMIN — Medication 650 MILLIGRAM(S): at 07:22

## 2022-08-27 RX ADMIN — ENOXAPARIN SODIUM 40 MILLIGRAM(S): 100 INJECTION SUBCUTANEOUS at 14:37

## 2022-08-27 RX ADMIN — PIPERACILLIN AND TAZOBACTAM 25 GRAM(S): 4; .5 INJECTION, POWDER, LYOPHILIZED, FOR SOLUTION INTRAVENOUS at 18:56

## 2022-08-27 RX ADMIN — GABAPENTIN 400 MILLIGRAM(S): 400 CAPSULE ORAL at 22:01

## 2022-08-27 RX ADMIN — Medication 650 MILLIGRAM(S): at 15:00

## 2022-08-27 RX ADMIN — AMLODIPINE BESYLATE 5 MILLIGRAM(S): 2.5 TABLET ORAL at 06:20

## 2022-08-27 RX ADMIN — Medication 25 MICROGRAM(S): at 06:20

## 2022-08-27 RX ADMIN — GABAPENTIN 400 MILLIGRAM(S): 400 CAPSULE ORAL at 14:38

## 2022-08-27 NOTE — PROGRESS NOTE ADULT - ASSESSMENT
Ms. 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 Differential of fluid collection at this time includes normal post operative seroma vs. abscess vs. biloma 2/2 bile leak. HIDA negative for bile leak, MRCP ordered for r/o choledocholithiasis. s/p IR drainage on 8/26    Plan:   -CLD  -IV abx-Zosyn   -Chem VTE ppx   -Home meds   -AM labs     B Team Surgery   [id number] Ms. 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 Differential of fluid collection at this time includes normal post operative seroma vs. abscess vs. biloma 2/2 bile leak. HIDA negative for bile leak, MRCP ordered for r/o choledocholithiasis. s/p IR drainage on 8/26.    Plan:   - MRCP today  - GI consult  - regular diet  - If MRCP negative, can dc 8/28/22 on Augmentin 875 BID for 7 days total    B Team Surgery   [id number]

## 2022-08-27 NOTE — PROGRESS NOTE ADULT - ATTENDING COMMENTS
Pt seen and examined with resident.  Agree with assessment and plan.  Imp: Postop (Cholecystectomy) infection and possible retained CBD stone.  MRCP.  GI eval for possible EUS/ERCP.

## 2022-08-27 NOTE — PROGRESS NOTE ADULT - SUBJECTIVE AND OBJECTIVE BOX
No overnight events TEAM Surgery Progress Note  Patient is a 57y old  Female who presents with a chief complaint of Fluid Collection in GB fossa (27 Aug 2022 04:31)      INTERVAL EVENTS: s/p lap regulo OSH 8/21/22. IR placed 8.5F Elise Gomez placed in gallbladder fossa 8/26.    SUBJECTIVE: Patient seen and examined at bedside with surgical team, patient without complaints. Denies ausea, vomiting, abdominal pain.    REVIEW OF SYSTEMS:  Constitutional: No fevers or chills. No malaise or weakness.  Respiratory: No cough, wheezing, or SOB. No hemoptysis.  Cardiovascular: No chest pain or palpitations.  Gastrointestinal: No abdominal pain. No nausea, vomiting, diarrhea or constipation.  Genitourinary: No dysuria, hematuria, or frequency.  Skin: No rashes or pruritus.     OBJECTIVE:    Vital Signs Last 24 Hrs  T(C): 36.7 (27 Aug 2022 06:00), Max: 36.7 (26 Aug 2022 12:22)  T(F): 98 (27 Aug 2022 06:00), Max: 98 (26 Aug 2022 12:22)  HR: 66 (27 Aug 2022 06:00) (55 - 66)  BP: 130/86 (27 Aug 2022 06:00) (126/81 - 140/81)  BP(mean): --  RR: 18 (27 Aug 2022 06:00) (17 - 18)  SpO2: 100% (27 Aug 2022 06:00) (98% - 100%)    Parameters below as of 26 Aug 2022 22:00  Patient On (Oxygen Delivery Method): room air    I&O's Detail    26 Aug 2022 07:01  -  27 Aug 2022 07:00  --------------------------------------------------------  IN:  Total IN: 0 mL    OUT:    Bulb (mL): 40 mL  Total OUT: 40 mL    Total NET: -40 mL      MEDICATIONS  (STANDING):  acetaminophen     Tablet .. 650 milliGRAM(s) Oral every 6 hours  amitriptyline 50 milliGRAM(s) Oral at bedtime  amLODIPine   Tablet 5 milliGRAM(s) Oral daily  atorvastatin 20 milliGRAM(s) Oral at bedtime  dextrose 5% + lactated ringers. 1000 milliLiter(s) (50 mL/Hr) IV Continuous <Continuous>  enoxaparin Injectable 40 milliGRAM(s) SubCutaneous every 24 hours  gabapentin 400 milliGRAM(s) Oral three times a day  levothyroxine 25 MICROGram(s) Oral daily  piperacillin/tazobactam IVPB.. 3.375 Gram(s) IV Intermittent every 8 hours    MEDICATIONS  (PRN):      PHYSICAL EXAM:  Constitutional: A&Ox3, NAD  Respiratory: Unlabored breathing  Abdomen: Soft, nondistended, NTTP. No rebound or guarding.  Extremities: WWP, VILLAVICENCIO spontaneously    LABS:                        12.5   7.10  )-----------( 284      ( 27 Aug 2022 06:50 )             39.4     08-27    139  |  100  |  4<L>  ----------------------------<  98  3.6   |  29  |  0.67    Ca    9.3      27 Aug 2022 06:49  Phos  4.2     08-27  Mg     2.00     08-27    TPro  6.7  /  Alb  3.7  /  TBili  0.8  /  DBili  x   /  AST  93<H>  /  ALT  227<H>  /  AlkPhos  262<H>  08-27    PT/INR - ( 26 Aug 2022 06:45 )   PT: 12.2 sec;   INR: 1.05 ratio         PTT - ( 26 Aug 2022 06:45 )  PTT:32.6 sec  LIVER FUNCTIONS - ( 27 Aug 2022 06:49 )  Alb: 3.7 g/dL / Pro: 6.7 g/dL / ALK PHOS: 262 U/L / ALT: 227 U/L / AST: 93 U/L / GGT: x

## 2022-08-28 ENCOUNTER — TRANSCRIPTION ENCOUNTER (OUTPATIENT)
Age: 57
End: 2022-08-28

## 2022-08-28 VITALS
HEART RATE: 74 BPM | TEMPERATURE: 98 F | SYSTOLIC BLOOD PRESSURE: 136 MMHG | OXYGEN SATURATION: 98 % | DIASTOLIC BLOOD PRESSURE: 91 MMHG | RESPIRATION RATE: 18 BRPM

## 2022-08-28 LAB
ALBUMIN SERPL ELPH-MCNC: 4 G/DL — SIGNIFICANT CHANGE UP (ref 3.3–5)
ALP SERPL-CCNC: 259 U/L — HIGH (ref 40–120)
ALT FLD-CCNC: 214 U/L — HIGH (ref 4–33)
ANION GAP SERPL CALC-SCNC: 12 MMOL/L — SIGNIFICANT CHANGE UP (ref 7–14)
AST SERPL-CCNC: 106 U/L — HIGH (ref 4–32)
BILIRUB SERPL-MCNC: 0.7 MG/DL — SIGNIFICANT CHANGE UP (ref 0.2–1.2)
BUN SERPL-MCNC: 7 MG/DL — SIGNIFICANT CHANGE UP (ref 7–23)
CALCIUM SERPL-MCNC: 9.6 MG/DL — SIGNIFICANT CHANGE UP (ref 8.4–10.5)
CHLORIDE SERPL-SCNC: 101 MMOL/L — SIGNIFICANT CHANGE UP (ref 98–107)
CO2 SERPL-SCNC: 26 MMOL/L — SIGNIFICANT CHANGE UP (ref 22–31)
CREAT SERPL-MCNC: 0.62 MG/DL — SIGNIFICANT CHANGE UP (ref 0.5–1.3)
EGFR: 104 ML/MIN/1.73M2 — SIGNIFICANT CHANGE UP
GLUCOSE SERPL-MCNC: 113 MG/DL — HIGH (ref 70–99)
HCT VFR BLD CALC: 41.1 % — SIGNIFICANT CHANGE UP (ref 34.5–45)
HGB BLD-MCNC: 12.9 G/DL — SIGNIFICANT CHANGE UP (ref 11.5–15.5)
MAGNESIUM SERPL-MCNC: 2 MG/DL — SIGNIFICANT CHANGE UP (ref 1.6–2.6)
MCHC RBC-ENTMCNC: 27.2 PG — SIGNIFICANT CHANGE UP (ref 27–34)
MCHC RBC-ENTMCNC: 31.4 GM/DL — LOW (ref 32–36)
MCV RBC AUTO: 86.7 FL — SIGNIFICANT CHANGE UP (ref 80–100)
NRBC # BLD: 0 /100 WBCS — SIGNIFICANT CHANGE UP (ref 0–0)
NRBC # FLD: 0 K/UL — SIGNIFICANT CHANGE UP (ref 0–0)
PHOSPHATE SERPL-MCNC: 4.2 MG/DL — SIGNIFICANT CHANGE UP (ref 2.5–4.5)
PLATELET # BLD AUTO: 313 K/UL — SIGNIFICANT CHANGE UP (ref 150–400)
POTASSIUM SERPL-MCNC: 3.8 MMOL/L — SIGNIFICANT CHANGE UP (ref 3.5–5.3)
POTASSIUM SERPL-SCNC: 3.8 MMOL/L — SIGNIFICANT CHANGE UP (ref 3.5–5.3)
PROT SERPL-MCNC: 7.2 G/DL — SIGNIFICANT CHANGE UP (ref 6–8.3)
RBC # BLD: 4.74 M/UL — SIGNIFICANT CHANGE UP (ref 3.8–5.2)
RBC # FLD: 14.8 % — HIGH (ref 10.3–14.5)
SODIUM SERPL-SCNC: 139 MMOL/L — SIGNIFICANT CHANGE UP (ref 135–145)
WBC # BLD: 9.23 K/UL — SIGNIFICANT CHANGE UP (ref 3.8–10.5)
WBC # FLD AUTO: 9.23 K/UL — SIGNIFICANT CHANGE UP (ref 3.8–10.5)

## 2022-08-28 PROCEDURE — 99231 SBSQ HOSP IP/OBS SF/LOW 25: CPT | Mod: GC

## 2022-08-28 PROCEDURE — 99024 POSTOP FOLLOW-UP VISIT: CPT

## 2022-08-28 RX ORDER — AMLODIPINE BESYLATE 2.5 MG/1
1 TABLET ORAL
Qty: 0 | Refills: 0 | DISCHARGE

## 2022-08-28 RX ORDER — POTASSIUM CHLORIDE 20 MEQ
20 PACKET (EA) ORAL
Refills: 0 | Status: COMPLETED | OUTPATIENT
Start: 2022-08-28 | End: 2022-08-28

## 2022-08-28 RX ADMIN — PIPERACILLIN AND TAZOBACTAM 25 GRAM(S): 4; .5 INJECTION, POWDER, LYOPHILIZED, FOR SOLUTION INTRAVENOUS at 01:14

## 2022-08-28 RX ADMIN — Medication 20 MILLIEQUIVALENT(S): at 11:09

## 2022-08-28 RX ADMIN — Medication 25 MICROGRAM(S): at 05:54

## 2022-08-28 RX ADMIN — PIPERACILLIN AND TAZOBACTAM 25 GRAM(S): 4; .5 INJECTION, POWDER, LYOPHILIZED, FOR SOLUTION INTRAVENOUS at 11:10

## 2022-08-28 RX ADMIN — ENOXAPARIN SODIUM 40 MILLIGRAM(S): 100 INJECTION SUBCUTANEOUS at 15:29

## 2022-08-28 RX ADMIN — Medication 650 MILLIGRAM(S): at 11:09

## 2022-08-28 RX ADMIN — GABAPENTIN 400 MILLIGRAM(S): 400 CAPSULE ORAL at 05:54

## 2022-08-28 RX ADMIN — Medication 650 MILLIGRAM(S): at 05:53

## 2022-08-28 RX ADMIN — Medication 20 MILLIEQUIVALENT(S): at 08:02

## 2022-08-28 RX ADMIN — Medication 650 MILLIGRAM(S): at 06:42

## 2022-08-28 RX ADMIN — GABAPENTIN 400 MILLIGRAM(S): 400 CAPSULE ORAL at 15:29

## 2022-08-28 RX ADMIN — AMLODIPINE BESYLATE 5 MILLIGRAM(S): 2.5 TABLET ORAL at 05:54

## 2022-08-28 NOTE — PROGRESS NOTE ADULT - ASSESSMENT
Ms. 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 Differential of fluid collection at this time includes normal post operative seroma vs. abscess vs. biloma 2/2 bile leak. HIDA negative for bile leak, MRCP negative for biliary leak, choledocholithiasis or ductal dialation. s/p IR drainage on 8/26.    Plan:   - DVT ppx  - regular diet  - dc on Augmentin 875 BID for 7 days total    B Team Surgery   [id number]

## 2022-08-28 NOTE — DISCHARGE NOTE PROVIDER - CARE PROVIDERS DIRECT ADDRESSES
,DirectAddress_Unknown ,DirectAddress_Unknown,[email or web address] ,DirectAddress_Unknown,DirectAddress_Unknown

## 2022-08-28 NOTE — DISCHARGE NOTE NURSING/CASE MANAGEMENT/SOCIAL WORK - NSDCPEFALRISK_GEN_ALL_CORE
For information on Fall & Injury Prevention, visit: [email or web address] OR  [email or web address] OR  https://www.cdc.gov/steadi/patient.html

## 2022-08-28 NOTE — DISCHARGE NOTE PROVIDER - NSDCCPCAREPLAN_GEN_ALL_CORE_FT
PRINCIPAL DISCHARGE DIAGNOSIS  Diagnosis: Post-operative complication  Assessment and Plan of Treatment: gallbladder fossa fluid collection, drained by IR.      SECONDARY DISCHARGE DIAGNOSES  Diagnosis: Abscess  Assessment and Plan of Treatment:      PRINCIPAL DISCHARGE DIAGNOSIS  Diagnosis: Post-operative complication  Assessment and Plan of Treatment: gallbladder fossa fluid collection, drained by IR.      SECONDARY DISCHARGE DIAGNOSES  Diagnosis: Abscess  Assessment and Plan of Treatment: drainage and antibiotics     PRINCIPAL DISCHARGE DIAGNOSIS  Diagnosis: Post-operative complication  Assessment and Plan of Treatment: .WOUND CARE:  Please keep incisions clean and dry. Please do not Scrub or rub incisions. Do not use lotion or powder on incisions.   BATHING: You may shower and/or sponge bathe. You may use warm soapy water in the shower and rinse, pat dry.  ACTIVITY: No heavy lifting or straining. Otherwise, you may return to your usual level of physical activity. If you are taking narcotic pain medication DO NOT drive a car, operate machinery or make important decisions.  DIET: Return to your usual diet.  NOTIFY YOUR SURGEON IF YOU HAVE: any bleeding that does not stop, any pus draining from your wound(s), any fever (over 100.4 F) persistent nausea/vomiting, or if your pain is not controlled on your discharge pain medications, unable to urinate.  FOLLOW UP:  1. Please follow up with your primary care physician in one week regarding your hospitalization, bring copies of your discharge paperwork.  2. Please follow up with your surgeon, Dr. Alford      SECONDARY DISCHARGE DIAGNOSES  Diagnosis: Abscess  Assessment and Plan of Treatment: drainage and antibiotics

## 2022-08-28 NOTE — DISCHARGE NOTE PROVIDER - PROVIDER TOKENS
Name band; PROVIDER:[TOKEN:[[id number]],FOLLOWUP:[2 weeks]] PROVIDER:[TOKEN:[[id number]],FOLLOWUP:[2 weeks]],PROVIDER:[TOKEN:[[id number]]] PROVIDER:[TOKEN:[[id number]],FOLLOWUP:[2 weeks]],PROVIDER:[TOKEN:[[id number]]]

## 2022-08-28 NOTE — PROGRESS NOTE ADULT - ATTENDING COMMENTS
Pt seen and examined with surgical resident.  Agree with assessment and plan.  Imp: s/p cholecystectomy with IR drainage of postop collection.  MRCP shows no evidence of retained CBD stones.  Discharge with drain care and followup in 1 week.

## 2022-08-28 NOTE — DISCHARGE NOTE PROVIDER - NSDCFUSCHEDAPPT_GEN_ALL_CORE_FT
Lucho Casillas  [facility]  PAINMGT [address]  Scheduled Appointment: 10/10/2022    [facility]  NEUROLOGY [address]  Scheduled Appointment: 11/15/2022    [facility]  NEUROLOGY [address]  Scheduled Appointment: 11/15/2022    Roxana Grande  [facility]  NEUROLOGY [address]  Scheduled Appointment: 11/15/2022

## 2022-08-28 NOTE — PROGRESS NOTE ADULT - SUBJECTIVE AND OBJECTIVE BOX
- MRCP completed, not read     - MRCP completed, not read  - GI consult emailed         Overnight events:   - MRCP completed, not read  - GI consult emailed    SUBJECTIVE: patient seen and examined. Pain well controlled. tolerating reg diet      OBJECTIVE:  Vital Signs Last 24 Hrs  T(C): 36.7 (28 Aug 2022 06:02), Max: 36.9 (27 Aug 2022 21:53)  T(F): 98.1 (28 Aug 2022 06:02), Max: 98.4 (27 Aug 2022 21:53)  HR: 72 (28 Aug 2022 06:02) (70 - 72)  BP: 123/65 (28 Aug 2022 06:02) (123/65 - 147/72)  BP(mean): --  RR: 18 (28 Aug 2022 06:02) (18 - 18)  SpO2: 98% (28 Aug 2022 06:02) (96% - 98%)    Parameters below as of 28 Aug 2022 06:02  Patient On (Oxygen Delivery Method): room air          08-27-22 @ 07:01  -  08-28-22 @ 07:00  --------------------------------------------------------  IN: 0 mL / OUT: 27 mL / NET: -27 mL        Physical Examination:  GEN: NAD, resting quietly  PULM: symmetric chest rise bilaterally, no increased WOB  ABD: soft, NT, nondistended, no rebound or guarding, RUQ drain in place with ss output  EXTR: no LE erythema, moving all extremities      LABS:                        12.9   9.23  )-----------( 313      ( 28 Aug 2022 07:17 )             41.1       08-28    139  |  101  |  7   ----------------------------<  113<H>  3.8   |  26  |  0.62    Ca    9.6      28 Aug 2022 07:17  Phos  4.2     08-28  Mg     2.00     08-28    TPro  7.2  /  Alb  4.0  /  TBili  0.7  /  DBili  x   /  AST  106<H>  /  ALT  214<H>  /  AlkPhos  259<H>  08-28    MR MRCP WAW IC                          IMPRESSION:  Status post cholecystectomy with decrease in size of gallbladder fossa   fluid collection compared with a August 24, 2022.  No biliary leak, stone or ductal dilatation.

## 2022-08-28 NOTE — PROGRESS NOTE ADULT - REASON FOR ADMISSION
Fluid Collection in GB fossa

## 2022-08-28 NOTE — DISCHARGE NOTE PROVIDER - HOSPITAL COURSE
57 F w PMHx HTN, HLD, Hypothyroidism, intracranial hemorrhage 2016 w residual neurological deficits and PSHx of Lap Alejandra 08/21/22 2/2 acute cholecystitis who presents with nausea, vomiting, and abdominal pain beginning POD 2 found to have fluid collection in GB fossa on CT A/P aw elevated T bili to 3.4 and elevated LFTs. Differential of fluid collection at this time includes normal post operative seroma vs. abscess vs. biloma 2/2 bile leak. HIDA negative for bile leak, MRCP negative biliary leak, stone or ductal dilatation. s/p IR drainage on 8/26. Patient was treated with zosyn and will continue with PO augmentin after discharge. Patient is tolerating a regular diet w/o n/v, ambulating w/o difficulty.

## 2022-08-28 NOTE — DISCHARGE NOTE PROVIDER - CARE PROVIDER_API CALL
Melissa Alford)  Critical Care Medicine; Surgery  [address] [place] [address]  Phone: [phone]  Fax: [phone]  Follow Up Time: 2 weeks   Melissa Alford)  Critical Care Medicine; Surgery  [address] [place] [address]  Phone: [phone]  Fax: [phone]  Follow Up Time: 2 weeks    Daniel Mcdonnell)  Diagnostic Radiology; [facility]iology  [address] [place] [address]  Phone: [phone]  Fax: [phone]  Follow Up Time:    Melissa Alford)  Critical Care Medicine; Surgery  [address] [place] [address]  Phone: [phone]  Fax: [phone]  Follow Up Time: 2 weeks    MADAY MINAYA  Diagnostic Radiology  Phone: ([phone]  Fax: [phone]  Follow Up Time:

## 2022-08-28 NOTE — DISCHARGE NOTE PROVIDER - NSDCMRMEDTOKEN_GEN_ALL_CORE_FT
amoxicillin-clavulanate 875 mg-125 mg oral tablet: 1 tab(s) orally 2 times a day   divalproex sodium 250 mg oral delayed release tablet: 1 tab(s) orally 2 times a day  omeprazole 20 mg oral delayed release capsule: 1 cap(s) orally once a day

## 2022-08-28 NOTE — DISCHARGE NOTE NURSING/CASE MANAGEMENT/SOCIAL WORK - PATIENT PORTAL LINK FT
You can access the FollowMyHealth Patient Portal offered by [facility] by registering at the following website: [email or web address]. By joining [facility]’s FollowMyHealth portal, you will also be able to view your health information using other applications (apps) compatible with our system.

## 2022-08-28 NOTE — DISCHARGE NOTE PROVIDER - NSDCFUADDINST_GEN_ALL_CORE_FT
Please follow up with Diagnostic radiology when the drain output is less than 10 ml per 24 hour.   Call [phone] to make an appointment

## 2022-08-31 LAB
CULTURE RESULTS: SIGNIFICANT CHANGE UP
SPECIMEN SOURCE: SIGNIFICANT CHANGE UP

## 2022-09-02 ENCOUNTER — RESULT REVIEW (OUTPATIENT)
Age: 57
End: 2022-09-02

## 2022-09-02 ENCOUNTER — OUTPATIENT (OUTPATIENT)
Dept: OUTPATIENT SERVICES | Facility: HOSPITAL | Age: 57
LOS: 1 days | End: 2022-09-02

## 2022-09-02 ENCOUNTER — APPOINTMENT (OUTPATIENT)
Dept: CT IMAGING | Facility: HOSPITAL | Age: 57
End: 2022-09-02

## 2022-09-02 DIAGNOSIS — H26.40 UNSPECIFIED SECONDARY CATARACT: Chronic | ICD-10-CM

## 2022-09-02 DIAGNOSIS — R18.8 OTHER ASCITES: ICD-10-CM

## 2022-09-02 PROCEDURE — 74150 CT ABDOMEN W/O CONTRAST: CPT | Mod: 26

## 2022-09-02 PROCEDURE — 49424 ASSESS CYST CONTRAST INJECT: CPT

## 2022-09-02 PROCEDURE — 76080 X-RAY EXAM OF FISTULA: CPT | Mod: 26

## 2022-09-06 ENCOUNTER — RX RENEWAL (OUTPATIENT)
Age: 57
End: 2022-09-06

## 2022-09-07 DIAGNOSIS — R18.8 OTHER ASCITES: ICD-10-CM

## 2022-09-07 DIAGNOSIS — Z46.82 ENCOUNTER FOR FITTING AND ADJUSTMENT OF NON-VASCULAR CATHETER: ICD-10-CM

## 2022-09-09 ENCOUNTER — APPOINTMENT (OUTPATIENT)
Dept: CT IMAGING | Facility: HOSPITAL | Age: 57
End: 2022-09-09

## 2022-09-09 ENCOUNTER — RESULT REVIEW (OUTPATIENT)
Age: 57
End: 2022-09-09

## 2022-09-09 ENCOUNTER — OUTPATIENT (OUTPATIENT)
Dept: OUTPATIENT SERVICES | Facility: HOSPITAL | Age: 57
LOS: 1 days | End: 2022-09-09

## 2022-09-09 VITALS
RESPIRATION RATE: 19 BRPM | HEART RATE: 68 BPM | TEMPERATURE: 98 F | SYSTOLIC BLOOD PRESSURE: 121 MMHG | DIASTOLIC BLOOD PRESSURE: 82 MMHG | OXYGEN SATURATION: 97 %

## 2022-09-09 VITALS
HEART RATE: 66 BPM | DIASTOLIC BLOOD PRESSURE: 84 MMHG | SYSTOLIC BLOOD PRESSURE: 129 MMHG | OXYGEN SATURATION: 98 % | RESPIRATION RATE: 19 BRPM | TEMPERATURE: 98 F

## 2022-09-09 DIAGNOSIS — H26.40 UNSPECIFIED SECONDARY CATARACT: Chronic | ICD-10-CM

## 2022-09-09 DIAGNOSIS — R18.8 OTHER ASCITES: ICD-10-CM

## 2022-09-09 PROCEDURE — 76080 X-RAY EXAM OF FISTULA: CPT | Mod: 26

## 2022-09-09 PROCEDURE — 49424 ASSESS CYST CONTRAST INJECT: CPT

## 2022-09-13 ENCOUNTER — APPOINTMENT (OUTPATIENT)
Dept: TRAUMA SURGERY | Facility: HOSPITAL | Age: 57
End: 2022-09-13

## 2022-09-13 VITALS
TEMPERATURE: 97.7 F | HEART RATE: 72 BPM | SYSTOLIC BLOOD PRESSURE: 123 MMHG | WEIGHT: 173 LBS | DIASTOLIC BLOOD PRESSURE: 80 MMHG | HEIGHT: 64 IN | BODY MASS INDEX: 29.53 KG/M2

## 2022-09-24 LAB
CULTURE RESULTS: SIGNIFICANT CHANGE UP
SPECIMEN SOURCE: SIGNIFICANT CHANGE UP

## 2022-09-26 ENCOUNTER — RX RENEWAL (OUTPATIENT)
Age: 57
End: 2022-09-26

## 2022-09-28 ENCOUNTER — RX RENEWAL (OUTPATIENT)
Age: 57
End: 2022-09-28

## 2022-09-28 ENCOUNTER — APPOINTMENT (OUTPATIENT)
Dept: PAIN MANAGEMENT | Facility: CLINIC | Age: 57
End: 2022-09-28

## 2022-09-28 ENCOUNTER — NON-APPOINTMENT (OUTPATIENT)
Age: 57
End: 2022-09-28

## 2022-09-28 VITALS
WEIGHT: 174 LBS | HEART RATE: 70 BPM | HEIGHT: 64 IN | DIASTOLIC BLOOD PRESSURE: 89 MMHG | SYSTOLIC BLOOD PRESSURE: 137 MMHG | BODY MASS INDEX: 29.71 KG/M2 | TEMPERATURE: 98 F

## 2022-09-28 PROCEDURE — 99213 OFFICE O/P EST LOW 20 MIN: CPT

## 2022-10-10 ENCOUNTER — APPOINTMENT (OUTPATIENT)
Dept: PAIN MANAGEMENT | Facility: CLINIC | Age: 57
End: 2022-10-10

## 2022-10-12 NOTE — ASSESSMENT
[FreeTextEntry1] : Chronic pain syndrome\par \par Will switch to lyrica 25 mgs bid and dc gabapentin by taper \par Increase to 2 bid of lyrica\par \par FU 6 weeks

## 2022-10-12 NOTE — HISTORY OF PRESENT ILLNESS
[FreeTextEntry1] : Left sided Headm, neck back, ankle chronic pain described as a burning sensation onset 2016 following brain hem \par \par On neurontin 400 mgs bid was told to take 3 per day not sure why. Notes some relief but not sure. . \par Sleep is not interrupted.

## 2022-10-12 NOTE — PHYSICAL EXAM
[General Appearance - Alert] : alert [General Appearance - In No Acute Distress] : in no acute distress [Oriented To Time, Place, And Person] : oriented to person, place, and time [Impaired Insight] : insight and judgment were intact [Affect] : the affect was normal [Sclera] : the sclera and conjunctiva were normal [PERRL With Normal Accommodation] : pupils were equal in size, round, reactive to light, with normal accommodation [Extraocular Movements] : extraocular movements were intact [Hearing Threshold Finger Rub Not [place]] : hearing was normal [Outer Ear] : the ears and nose were normal in appearance [Oropharynx] : the oropharynx was normal [Neck Appearance] : the appearance of the neck was normal [Neck Cervical Mass (___cm)] : no neck mass was observed [Jugular Venous Distention Increased] : there was no jugular-venous distention [Thyroid Diffuse Enlargement] : the thyroid was not enlarged [Thyroid Nodule] : there were no palpable thyroid nodules [Auscultation Breath Sounds / Voice Sounds] : lungs were clear to auscultation bilaterally [Heart Rate And Rhythm] : heart rate was normal and rhythm regular [Heart Sounds] : normal S1 and S2 [Heart Sounds Gallop] : no gallops [Murmurs] : no murmurs [Heart Sounds Pericardial Friction Rub] : no pericardial rub [Arterial Pulses Carotid] : carotid pulses were normal with no bruits [Veins - Varicosity Changes] : there were no varicosital changes [FreeTextEntry1] : Mild right lower extremity distal edema [No CVA Tenderness] : no ~M costovertebral angle tenderness [No Spinal Tenderness] : no spinal tenderness [Abnormal Walk] : normal gait [Nail Clubbing] : no clubbing  or cyanosis of the fingernails [Musculoskeletal - Swelling] : no joint swelling seen [Motor Tone] : muscle strength and tone were normal [Skin Color & Pigmentation] : normal skin color and pigmentation [Skin Turgor] : normal skin turgor [] : no rash

## 2022-10-20 NOTE — PATIENT PROFILE ADULT. - AS SC BRADEN ACTIVITY
Airway  Performed by: Shola Lake CRNA  Authorized by: Lori Anderson MD     Final Airway Type:  Endotracheal airway  Final Endotracheal Airway*:  ETT  ETT Size (mm)*:  7.5  Cuff*:  Regular  Technique Used for Successful ETT Placement:  Direct laryngoscopy  Devices/Methods Used in Placement*:  Mask  Intubation Procedure*:  Preoxygenation, ETCO2, Atraumatic, Dentition Unchanged and Pharynx Clear  Insertion Site:  Oral  Blade Type*:  Video Laryngoscope  Blade Size*:  3  Measured from*:  Lips  Secured at (cm)*:  22  Placement Verified by: auscultation and capnometry    Glottic View*:  1 - full view of glottis  Attempts*:  1   Patient Identified, Procedure confirmed, Emergency equipment available and Safety protocols followed  Location:  OR  Urgency:  Elective  Difficult Airway: No    Indications for Airway Management:  Anesthesia  Mask Difficulty Assessment:  2 - vent by mask + OA or adjuvant +/- NMBA  Performed By:  CRNA  CRNA:  Shola Lake CRNA  Start Time: 10/20/2022 1:13 PM   Easy mask ventilation with oral airway, easy intubation with Christopher 3     (3) walks occasionally

## 2022-10-21 ENCOUNTER — TRANSCRIPTION ENCOUNTER (OUTPATIENT)
Age: 57
End: 2022-10-21

## 2022-10-27 ENCOUNTER — TRANSCRIPTION ENCOUNTER (OUTPATIENT)
Age: 57
End: 2022-10-27

## 2022-11-11 ENCOUNTER — TRANSCRIPTION ENCOUNTER (OUTPATIENT)
Age: 57
End: 2022-11-11

## 2022-11-23 ENCOUNTER — TRANSCRIPTION ENCOUNTER (OUTPATIENT)
Age: 57
End: 2022-11-23

## 2022-11-23 ENCOUNTER — APPOINTMENT (OUTPATIENT)
Dept: NEUROLOGY | Facility: CLINIC | Age: 57
End: 2022-11-23

## 2022-11-23 PROCEDURE — 99213 OFFICE O/P EST LOW 20 MIN: CPT

## 2022-11-23 PROCEDURE — 93880 EXTRACRANIAL BILAT STUDY: CPT

## 2022-11-23 PROCEDURE — 93892 TCD EMBOLI DETECT W/O INJ: CPT

## 2022-11-23 PROCEDURE — 93886 INTRACRANIAL COMPLETE STUDY: CPT

## 2022-11-28 ENCOUNTER — APPOINTMENT (OUTPATIENT)
Dept: PAIN MANAGEMENT | Facility: CLINIC | Age: 57
End: 2022-11-28

## 2022-12-19 ENCOUNTER — NON-APPOINTMENT (OUTPATIENT)
Age: 57
End: 2022-12-19

## 2023-01-17 ENCOUNTER — APPOINTMENT (OUTPATIENT)
Dept: PAIN MANAGEMENT | Facility: CLINIC | Age: 58
End: 2023-01-17
Payer: COMMERCIAL

## 2023-01-17 VITALS
HEIGHT: 64 IN | SYSTOLIC BLOOD PRESSURE: 136 MMHG | BODY MASS INDEX: 29.02 KG/M2 | HEART RATE: 64 BPM | WEIGHT: 170 LBS | DIASTOLIC BLOOD PRESSURE: 84 MMHG

## 2023-01-17 PROCEDURE — 99213 OFFICE O/P EST LOW 20 MIN: CPT

## 2023-02-19 NOTE — PHYSICAL EXAM
[General Appearance - Alert] : alert [General Appearance - In No Acute Distress] : in no acute distress [Oriented To Time, Place, And Person] : oriented to person, place, and time [Impaired Insight] : insight and judgment were intact [Affect] : the affect was normal [Sclera] : the sclera and conjunctiva were normal [Extraocular Movements] : extraocular movements were intact [PERRL With Normal Accommodation] : pupils were equal in size, round, reactive to light, with normal accommodation [Outer Ear] : the ears and nose were normal in appearance [Hearing Threshold Finger Rub Not [place]] : hearing was normal [Oropharynx] : the oropharynx was normal [Neck Appearance] : the appearance of the neck was normal [Neck Cervical Mass (___cm)] : no neck mass was observed [Jugular Venous Distention Increased] : there was no jugular-venous distention [Thyroid Diffuse Enlargement] : the thyroid was not enlarged [Thyroid Nodule] : there were no palpable thyroid nodules [Auscultation Breath Sounds / Voice Sounds] : lungs were clear to auscultation bilaterally [Heart Rate And Rhythm] : heart rate was normal and rhythm regular [Heart Sounds] : normal S1 and S2 [Heart Sounds Gallop] : no gallops [Murmurs] : no murmurs [Heart Sounds Pericardial Friction Rub] : no pericardial rub [Arterial Pulses Carotid] : carotid pulses were normal with no bruits [Veins - Varicosity Changes] : there were no varicosital changes [FreeTextEntry1] : Mild right lower extremity distal edema [No CVA Tenderness] : no ~M costovertebral angle tenderness [No Spinal Tenderness] : no spinal tenderness [Abnormal Walk] : normal gait [Nail Clubbing] : no clubbing  or cyanosis of the fingernails [Musculoskeletal - Swelling] : no joint swelling seen [Motor Tone] : muscle strength and tone were normal [Skin Color & Pigmentation] : normal skin color and pigmentation [Skin Turgor] : normal skin turgor [] : no rash

## 2023-02-19 NOTE — HISTORY OF PRESENT ILLNESS
[FreeTextEntry1] : Left sided Head, neck back, ankle chronic pain described as a burning sensation onset 2016 following brain hem \par \par On neurontin 400 mgs bid was told to take 3 per day not sure why. Notes some relief but not sure. . \par Sleep is not interrupted.

## 2023-02-21 ENCOUNTER — TRANSCRIPTION ENCOUNTER (OUTPATIENT)
Age: 58
End: 2023-02-21

## 2023-02-21 RX ORDER — PREGABALIN 25 MG/1
25 CAPSULE ORAL 3 TIMES DAILY
Qty: 90 | Refills: 1 | Status: DISCONTINUED | COMMUNITY
Start: 2022-09-28 | End: 2023-02-21

## 2023-02-22 ENCOUNTER — TRANSCRIPTION ENCOUNTER (OUTPATIENT)
Age: 58
End: 2023-02-22

## 2023-04-25 ENCOUNTER — APPOINTMENT (OUTPATIENT)
Dept: PAIN MANAGEMENT | Facility: CLINIC | Age: 58
End: 2023-04-25
Payer: COMMERCIAL

## 2023-04-25 VITALS
HEART RATE: 66 BPM | BODY MASS INDEX: 29.88 KG/M2 | DIASTOLIC BLOOD PRESSURE: 90 MMHG | HEIGHT: 64 IN | SYSTOLIC BLOOD PRESSURE: 156 MMHG | WEIGHT: 175 LBS

## 2023-04-25 PROCEDURE — 99214 OFFICE O/P EST MOD 30 MIN: CPT

## 2023-05-10 ENCOUNTER — APPOINTMENT (OUTPATIENT)
Dept: NEUROLOGY | Facility: CLINIC | Age: 58
End: 2023-05-10
Payer: COMMERCIAL

## 2023-05-10 VITALS
HEIGHT: 64 IN | DIASTOLIC BLOOD PRESSURE: 84 MMHG | SYSTOLIC BLOOD PRESSURE: 135 MMHG | WEIGHT: 177 LBS | HEART RATE: 69 BPM | BODY MASS INDEX: 30.22 KG/M2

## 2023-05-10 PROCEDURE — 99215 OFFICE O/P EST HI 40 MIN: CPT

## 2023-05-10 NOTE — DISCUSSION/SUMMARY
[Intensive Blood Pressure Control] : intensive blood pressure control [Lipid Lowering Therapy] : lipid lowering therapy [Patient encouraged to discuss with Primary MD] : I encouraged the patient to discuss these important issues with ~his/her~ primary care doctor [Goals and Counseling] : I have reviewed the goals of stroke risk factor modification. I counseled the patient on measures to reduce stroke risk, including the importance of medication compliance, risk factor control, exercise, healthy diet and avoidance of smoking. I reviewed stroke warning signs and symptoms and appropriate actions to take if such occur. [FreeTextEntry1] : Summary from Dr. Austin Castellon's note dated 2/14/2019-with modification.\par \par On 12/13/16, she noticed severe HA and associated neck pain, as well as left-sided numbness, due to a right basal ganglia hemorrhage (probably just impinging on the  right lateral thalamus), most likely due to chronic hypertension.  Subsequent MRI suggested the possibility of a tiny cavernoma, but I doubt that this is clinically relevant and, in any case, the deep location would make surgical resection difficult.  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  Overall she appears to be neurologically stable.  Her main complaints at are centered around her post stroke central pain syndrome and other types of pain such as sciatica.  From the neurovascular standpoint, I advised her whenever possible to have frequent follow-up with you for her blood pressure.\par \par 10/27/2020.  She has persistent left-sided burning pain on gabapentin 400 mg twice daily.  Encouraged her to take gabapentin 3 times daily as had been prescribed.\par \par 5/10/2021.  Overall she is neurologically stable.  From the standpoint of focal deficits, she is doing quite well, but she continues to have a left-sided post stroke pain syndrome, as well as multifocal pain, which interferes with her quality of life.  I suggested consultation with Dr. Lucho Casillas (pain management specialist).\par \par She has daytime fatigue and snores, raising the possibility of sleep apnea.  I have requested a home sleep study.\par \par She should benefit from ongoing management of vascular risk factors, particularly aggressive management of blood pressure, which should decrease the risk of recurrent intracerebral hemorrhage.\par \par She will continue outpatient PT.\par \par 6/13/2022.  Overall she is neurologically stable or even improved in terms of subtle focal deficits, but she continues to experience severe poststroke central pain, as well as what I assume to be orthopedic pain now in her right leg, perhaps because of overcompensation for left-sided pain.  I have again referred her to Dr. Lucho Casillas for pain management.  She will continue PT and chiropractic treatment as well.\par \par She continues to complain of daytime fatigue, raising the possibility of sleep apnea.  I have again requested a home sleep study.\par \par 11/23/22\par Neurologically she is stable. I spoke with Dr. Casillas who advised she can stop gabapentin and start Lyrica 25 mg BID and follow up with him. Dopplers are WNL. Placed order for driving evaluation. Referral placed for neuro psych evaluation. Follow up with us in 6 months or sooner if needed. \par \par 5/10/2023.\par - Overall, she is neurologically stable.\par - She has been endorsing memory difficulties over the last year; notably forgetting what she had read. It is possible that this actually represents a concentration deficit. This perhaps represents post-stroke mild cognitive impairment, and/or depression. She recently had neuropsychological testing done to further clarify the extent of her impairment-results pending. \par - She has been noting increased episodes of falling. I recommend she resume PT for gait training. Referral provided. \par - She should continue to benefit from management of vascular risk factors, particularly aggressive management of blood pressure.\par \par She can follow up in 3 months with Dopplers. I hope she remains free of further serious trouble.

## 2023-05-10 NOTE — HISTORY OF PRESENT ILLNESS
[FreeTextEntry1] : 58-year-old right-handed lady.\par \par Summary from Dr. Austin Castellon's note dated 2/14/2019-with modification.\par \par On 12/13/16, she noticed severe HA and associated neck pain. . She was seen by PCP as she noticed L sided numbness. She was given ABx by her PCP for possible sinus infection. On 12/25/16, she noticed some worsening of her symptoms and she was brought to [facility] . She was found to have ICH. She was evaluated by Dr. Livingston in 2/17 and she was started on levetiracetam 500 mg BID for episodes concerning for seizures (spreading sensory paresthesia concerning for sensory seizures). She was not able to tolerate levetiracetam  excessive sleepiness and was it was discontinued. She was also evaluated by Dr. Mcclain. She is currently not on any AEDs. In 2/18, she had severe headaches. She underwent LP to rule out CNS infection. She developed worsening of the HAs after the LP, presumed to be due to low pressure HAs. She was treated with VPA . She reports to try to taper herself off of the VPA but developed rebound HAs.  She also reports to have developed abnormal hearing after her thalamic ICH - described as "amplified voices but without any tinnitus and associated with mild hearing loss in the left ear".\par \par ICH workup:\par CT brain (12/25/16): Right posterior putaminal hemorrhage with surrounding vasogenic edema\par CTA head and neck (12/25/16): Unremarkable\par MRI brain (12/27/16): Redemonstration of posterior putaminal intracerebral hemorrhage with surrounding vasogenic edema, mild leukoaraiosis and no evidence of abnormal contrast enhancement, no evidence of prior microhemorrhages\par MRI brain (2/6/17): Expected evolution of right basal ganglia ICH without any evidence of acute stroke\par MRI brain/MRA head (7/17): Expected evolution and almost resolution of right basal ganglia ICH, no abnormal contrast enhancement is noted in the region, no evidence of vascular malformation\par \par Repeat MRI brain with contrast (2/26/19-compared to 7/9/2017) was essentially unchanged, showing the chronic hemorrhage in the right lentiform nucleus, with small enhancing vessels, 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  \par Repeat MRA neck and head (2/26/2019) was unremarkable.\par \par 4/28/2020. This service was provided using telehealth (video).\par Location of patient: Home\par Location of provider: Office\par Names of all persons participating in the telehealth service and their role in the encounter: Patient\par \par She reports no new focal neurologic symptoms.  She complains of persistent left-sided burning pain and headaches.  She has been working at a [employer] for the [employer].  She can walk independently and sometimes uses a cane, is independent in all activities of daily living, but does not drive as she used to because she has trouble localizing sounds (MRS = 2).\par \par 10/27/2020.  Reports persistent left-sided burning pain.\par \par 5/10/2021.  She came to the office today.  She reports persistent left-sided burning pain, particularly affecting her leg.  In addition, she has chronic low back pain and has difficulty standing for more than 1-2 minutes.  She also reports neck pain, knee pain and other areas of pain, not specified.\par \par 6/13/2021.  She came to the office today.  She continues to complain of left-sided, particularly left leg burning pain.  She also complains of bilateral ankle and right knee pain as well as low back pain.  She has occasional right temporal headaches which resolved spontaneously.  She is undergoing PT and chiropractic treatment once a week which she feels provide some benefit\par \par 11/23/2022\par Today she reports feeling well. Dopplers are WNL. She does complain of some short term memory issues. she is currently on Gabapentin 400 at night and Lyrica 25 mg in AM - working with Dr. Casillas to titrate off gabapentin. \par \par 5/10/2023.  She came to the office today. Over the last year, and more notably in the last 6 months, she notes that she has been experiencing difficulty with remembering things; for example, when she is reading, she forgets what she head read on the prior line. She is also noting increasing episodes of falling. She otherwise, denies any new focal neurologic symptoms.

## 2023-05-10 NOTE — PHYSICAL EXAM
[General Appearance - Alert] : alert [General Appearance - In No Acute Distress] : in no acute distress [Oriented To Time, Place, And Person] : oriented to person, place, and time [Impaired Insight] : insight and judgment were intact [Affect] : the affect was normal [Sclera] : the sclera and conjunctiva were normal [PERRL With Normal Accommodation] : pupils were equal in size, round, reactive to light, with normal accommodation [Extraocular Movements] : extraocular movements were intact [Outer Ear] : the ears and nose were normal in appearance [Hearing Threshold Finger Rub Not [place]] : hearing was normal [Oropharynx] : the oropharynx was normal [Neck Appearance] : the appearance of the neck was normal [Neck Cervical Mass (___cm)] : no neck mass was observed [Jugular Venous Distention Increased] : there was no jugular-venous distention [Thyroid Diffuse Enlargement] : the thyroid was not enlarged [Thyroid Nodule] : there were no palpable thyroid nodules [Auscultation Breath Sounds / Voice Sounds] : lungs were clear to auscultation bilaterally [Heart Rate And Rhythm] : heart rate was normal and rhythm regular [Heart Sounds] : normal S1 and S2 [Heart Sounds Gallop] : no gallops [Murmurs] : no murmurs [Heart Sounds Pericardial Friction Rub] : no pericardial rub [Arterial Pulses Carotid] : carotid pulses were normal with no bruits [Veins - Varicosity Changes] : there were no varicosital changes [No CVA Tenderness] : no ~M costovertebral angle tenderness [No Spinal Tenderness] : no spinal tenderness [Abnormal Walk] : normal gait [Nail Clubbing] : no clubbing  or cyanosis of the fingernails [Musculoskeletal - Swelling] : no joint swelling seen [Motor Tone] : muscle strength and tone were normal [Skin Color & Pigmentation] : normal skin color and pigmentation [Skin Turgor] : normal skin turgor [] : no rash [FreeTextEntry1] : Mild right lower extremity distal edema

## 2023-05-10 NOTE — CONSULT LETTER
[Dear  ___] : Dear  [unfilled], [Please see my note below.] : Please see my note below. [Consult Closing:] : Thank you very much for allowing me to participate in the care of this patient.  If you have any questions, please do not hesitate to contact me. [Sincerely,] : Sincerely, [FreeTextEntry2] : Zoltan Meza MD\par [place] [FreeTextEntry3] : Richard B. Libman, MD, FRCPC \par , Neurology \par Co-Director, Stroke Center\par Professor of Neurology\par [employer] [facility]\par

## 2023-05-10 NOTE — REVIEW OF SYSTEMS
[Feeling Tired] : feeling tired [Recent Weight Gain (___ Lbs)] : recent [unfilled] ~Ulb weight gain [As Noted in HPI] : as noted in HPI [Chest Pain] : chest pain [Joint Pain] : joint pain [Negative] : Heme/Lymph [FreeTextEntry2] : Daytime fatigue and also snoring [FreeTextEntry5] : Chest pain diagnosed as noncardiac, probably GI-related [FreeTextEntry8] : Frequency [FreeTextEntry9] : Sciatica and multifocal joint pain

## 2023-06-02 ENCOUNTER — RX RENEWAL (OUTPATIENT)
Age: 58
End: 2023-06-02

## 2023-06-02 ENCOUNTER — TRANSCRIPTION ENCOUNTER (OUTPATIENT)
Age: 58
End: 2023-06-02

## 2023-06-19 NOTE — PROVIDER CONTACT NOTE (OTHER) - ASSESSMENT
PA TO ASSESS AND EVALUATE
BP STABLE NO C/O PAIN
VSS, headache
pt c/o headache and chest pain, no sob or vomiting.
No

## 2023-07-05 ENCOUNTER — RX RENEWAL (OUTPATIENT)
Age: 58
End: 2023-07-05

## 2023-07-05 ENCOUNTER — TRANSCRIPTION ENCOUNTER (OUTPATIENT)
Age: 58
End: 2023-07-05

## 2023-07-07 ENCOUNTER — APPOINTMENT (OUTPATIENT)
Dept: NEUROLOGY | Facility: CLINIC | Age: 58
End: 2023-07-07
Payer: COMMERCIAL

## 2023-07-07 PROCEDURE — 95806 SLEEP STUDY UNATT&RESP EFFT: CPT

## 2023-07-11 ENCOUNTER — NON-APPOINTMENT (OUTPATIENT)
Age: 58
End: 2023-07-11

## 2023-07-11 NOTE — ED PROVIDER NOTE - DATE/TIME 6
Pt reports feeling overwhelmed because she is currently "homeless" (RN made aware). Prior to hospitalization, pt reports she was staying with her friend in a home with steps to enter. However, pt explaining she isn't sure if she will be able to go back there or she could stay with her aunt who lives in a basement, +steps to negotiate. 28-Feb-2018 08:12

## 2023-08-08 ENCOUNTER — APPOINTMENT (OUTPATIENT)
Dept: PAIN MANAGEMENT | Facility: CLINIC | Age: 58
End: 2023-08-08
Payer: COMMERCIAL

## 2023-08-08 VITALS
DIASTOLIC BLOOD PRESSURE: 84 MMHG | SYSTOLIC BLOOD PRESSURE: 138 MMHG | BODY MASS INDEX: 31.07 KG/M2 | WEIGHT: 182 LBS | HEIGHT: 64 IN | HEART RATE: 70 BPM

## 2023-08-08 PROCEDURE — 99214 OFFICE O/P EST MOD 30 MIN: CPT

## 2023-08-08 NOTE — HISTORY OF PRESENT ILLNESS
[FreeTextEntry1] : Patient continues to complain of back and left leg pain. No recent imaging of lumbar spine, Continues to co neck pain. Standing triggers pain. No new medical problems,

## 2023-08-08 NOTE — PHYSICAL EXAM
[General Appearance - Alert] : alert [General Appearance - In No Acute Distress] : in no acute distress [Oriented To Time, Place, And Person] : oriented to person, place, and time [Impaired Insight] : insight and judgment were intact [Affect] : the affect was normal [PERRL With Normal Accommodation] : pupils were equal in size, round, reactive to light, with normal accommodation [Sclera] : the sclera and conjunctiva were normal [Extraocular Movements] : extraocular movements were intact [Outer Ear] : the ears and nose were normal in appearance [Hearing Threshold Finger Rub Not [place]] : hearing was normal [Oropharynx] : the oropharynx was normal [Neck Appearance] : the appearance of the neck was normal [Neck Cervical Mass (___cm)] : no neck mass was observed [Jugular Venous Distention Increased] : there was no jugular-venous distention [Thyroid Diffuse Enlargement] : the thyroid was not enlarged [Thyroid Nodule] : there were no palpable thyroid nodules [Auscultation Breath Sounds / Voice Sounds] : lungs were clear to auscultation bilaterally [Heart Rate And Rhythm] : heart rate was normal and rhythm regular [Heart Sounds] : normal S1 and S2 [Heart Sounds Gallop] : no gallops [Murmurs] : no murmurs [Heart Sounds Pericardial Friction Rub] : no pericardial rub [Arterial Pulses Carotid] : carotid pulses were normal with no bruits [Veins - Varicosity Changes] : there were no varicosital changes [FreeTextEntry1] : Mild right lower extremity distal edema [No CVA Tenderness] : no ~M costovertebral angle tenderness [No Spinal Tenderness] : no spinal tenderness [Abnormal Walk] : normal gait [Nail Clubbing] : no clubbing  or cyanosis of the fingernails [Musculoskeletal - Swelling] : no joint swelling seen [Motor Tone] : muscle strength and tone were normal [Skin Color & Pigmentation] : normal skin color and pigmentation [Skin Turgor] : normal skin turgor [] : no rash

## 2023-08-08 NOTE — PHYSICAL EXAM
[General Appearance - Alert] : alert [General Appearance - In No Acute Distress] : in no acute distress [Oriented To Time, Place, And Person] : oriented to person, place, and time [Impaired Insight] : insight and judgment were intact [Affect] : the affect was normal [Sclera] : the sclera and conjunctiva were normal [PERRL With Normal Accommodation] : pupils were equal in size, round, reactive to light, with normal accommodation [Extraocular Movements] : extraocular movements were intact [Outer Ear] : the ears and nose were normal in appearance [Hearing Threshold Finger Rub Not [place]] : hearing was normal [Oropharynx] : the oropharynx was normal [Neck Appearance] : the appearance of the neck was normal [Neck Cervical Mass (___cm)] : no neck mass was observed [Jugular Venous Distention Increased] : there was no jugular-venous distention [Thyroid Diffuse Enlargement] : the thyroid was not enlarged [Thyroid Nodule] : there were no palpable thyroid nodules [Auscultation Breath Sounds / Voice Sounds] : lungs were clear to auscultation bilaterally [Heart Rate And Rhythm] : heart rate was normal and rhythm regular [Heart Sounds] : normal S1 and S2 [Heart Sounds Gallop] : no gallops [Murmurs] : no murmurs [Heart Sounds Pericardial Friction Rub] : no pericardial rub [Arterial Pulses Carotid] : carotid pulses were normal with no bruits [Veins - Varicosity Changes] : there were no varicosital changes [FreeTextEntry1] : Mild right lower extremity distal edema [No CVA Tenderness] : no ~M costovertebral angle tenderness [No Spinal Tenderness] : no spinal tenderness [Abnormal Walk] : normal gait [Nail Clubbing] : no clubbing  or cyanosis of the fingernails [Musculoskeletal - Swelling] : no joint swelling seen [Motor Tone] : muscle strength and tone were normal [Skin Color & Pigmentation] : normal skin color and pigmentation [Skin Turgor] : normal skin turgor [] : no rash

## 2023-08-08 NOTE — PHYSICAL EXAM
[General Appearance - Alert] : alert [General Appearance - In No Acute Distress] : in no acute distress [Oriented To Time, Place, And Person] : oriented to person, place, and time [Impaired Insight] : insight and judgment were intact [Affect] : the affect was normal [PERRL With Normal Accommodation] : pupils were equal in size, round, reactive to light, with normal accommodation [Sclera] : the sclera and conjunctiva were normal [Extraocular Movements] : extraocular movements were intact [Outer Ear] : the ears and nose were normal in appearance [Hearing Threshold Finger Rub Not [place]] : hearing was normal [Oropharynx] : the oropharynx was normal [Neck Appearance] : the appearance of the neck was normal [Neck Cervical Mass (___cm)] : no neck mass was observed [Jugular Venous Distention Increased] : there was no jugular-venous distention [Thyroid Diffuse Enlargement] : the thyroid was not enlarged [Thyroid Nodule] : there were no palpable thyroid nodules [Auscultation Breath Sounds / Voice Sounds] : lungs were clear to auscultation bilaterally [Heart Rate And Rhythm] : heart rate was normal and rhythm regular [Heart Sounds] : normal S1 and S2 [Heart Sounds Gallop] : no gallops [Murmurs] : no murmurs [Heart Sounds Pericardial Friction Rub] : no pericardial rub [Arterial Pulses Carotid] : carotid pulses were normal with no bruits [Veins - Varicosity Changes] : there were no varicosital changes [FreeTextEntry1] : Mild right lower extremity distal edema [No CVA Tenderness] : no ~M costovertebral angle tenderness [No Spinal Tenderness] : no spinal tenderness [Abnormal Walk] : normal gait [Nail Clubbing] : no clubbing  or cyanosis of the fingernails [Musculoskeletal - Swelling] : no joint swelling seen [Motor Tone] : muscle strength and tone were normal [Skin Color & Pigmentation] : normal skin color and pigmentation [Skin Turgor] : normal skin turgor [] : no rash

## 2023-08-08 NOTE — ASSESSMENT
[FreeTextEntry1] : Chronic pain syndrome Central pain syndrome Possible lumbar radiculopathy Continue Elavil 50 mgs and increase Pregabalin 75 mgs bid Recommend MRI lumbar spine - consider epidural steroid injections. Patient has prior hx of sciatica.

## 2023-08-14 ENCOUNTER — APPOINTMENT (OUTPATIENT)
Dept: NEUROLOGY | Facility: CLINIC | Age: 58
End: 2023-08-14
Payer: COMMERCIAL

## 2023-08-14 VITALS
SYSTOLIC BLOOD PRESSURE: 127 MMHG | BODY MASS INDEX: 30.73 KG/M2 | HEIGHT: 64 IN | HEART RATE: 68 BPM | DIASTOLIC BLOOD PRESSURE: 88 MMHG | WEIGHT: 180 LBS

## 2023-08-14 PROCEDURE — 99215 OFFICE O/P EST HI 40 MIN: CPT

## 2023-08-14 RX ORDER — GABAPENTIN 400 MG/1
400 CAPSULE ORAL 3 TIMES DAILY
Qty: 90 | Refills: 1 | Status: DISCONTINUED | COMMUNITY
Start: 2022-06-13 | End: 2023-08-14

## 2023-08-14 NOTE — CONSULT LETTER
[Dear  ___] : Dear  [unfilled], [Please see my note below.] : Please see my note below. [Consult Closing:] : Thank you very much for allowing me to participate in the care of this patient.  If you have any questions, please do not hesitate to contact me. [Sincerely,] : Sincerely, [FreeTextEntry2] : Zoltan Meza MD\par  [place] [FreeTextEntry3] : Richard B. Libman, MD, FRCPC \par  , Neurology \par  Co-Director, Stroke Center\par  Professor of Neurology\par  [facility] [employer] at [facility]\par   [DrBurke  ___] : Dr. MUNOZ

## 2023-08-14 NOTE — DISCUSSION/SUMMARY
[Intensive Blood Pressure Control] : intensive blood pressure control [Lipid Lowering Therapy] : lipid lowering therapy [Patient encouraged to discuss with Primary MD] : I encouraged the patient to discuss these important issues with ~his/her~ primary care doctor [Goals and Counseling] : I have reviewed the goals of stroke risk factor modification. I counseled the patient on measures to reduce stroke risk, including the importance of medication compliance, risk factor control, exercise, healthy diet and avoidance of smoking. I reviewed stroke warning signs and symptoms and appropriate actions to take if such occur. [FreeTextEntry1] : Summary from Dr. Austin Castellon's note dated 2/14/2019-with modification.  On 12/13/16, she noticed severe HA and associated neck pain, as well as left-sided numbness, due to a right basal ganglia hemorrhage (probably just impinging on the  right lateral thalamus), most likely due to chronic hypertension.  Subsequent MRI suggested the possibility of a tiny cavernoma, but I doubt that this is clinically relevant and, in any case, the deep location would make surgical resection difficult.  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  4/28/20.  Overall she appears to be neurologically stable.  Her main complaints at are centered around her post stroke central pain syndrome and other types of pain such as sciatica.  From the neurovascular standpoint, I advised her whenever possible to have frequent follow-up with you for her blood pressure.  10/27/2020.  She has persistent left-sided burning pain on gabapentin 400 mg twice daily.  Encouraged her to take gabapentin 3 times daily as had been prescribed.  5/10/2021.  Overall she is neurologically stable.  From the standpoint of focal deficits, she is doing quite well, but she continues to have a left-sided post stroke pain syndrome, as well as multifocal pain, which interferes with her quality of life.  I suggested consultation with Dr. Lucho Casillas (pain management specialist).  She has daytime fatigue and snores, raising the possibility of sleep apnea.  I have requested a home sleep study.  She should benefit from ongoing management of vascular risk factors, particularly aggressive management of blood pressure, which should decrease the risk of recurrent intracerebral hemorrhage.  She will continue outpatient PT.  6/13/2022.  Overall she is neurologically stable or even improved in terms of subtle focal deficits, but she continues to experience severe poststroke central pain, as well as what I assume to be orthopedic pain now in her right leg, perhaps because of overcompensation for left-sided pain.  I have again referred her to Dr. Lucho Casillas for pain management.  She will continue PT and chiropractic treatment as well.  She continues to complain of daytime fatigue, raising the possibility of sleep apnea.  I have again requested a home sleep study.  11/23/22 Neurologically she is stable. I spoke with Dr. Casillas who advised she can stop gabapentin and start Lyrica 25 mg BID and follow up with him. Dopplers are WNL. Placed order for driving evaluation. Referral placed for neuro psych evaluation. Follow up with us in 6 months or sooner if needed.   5/10/2023. - There was the interval appearance of a subtle left ptosis, wh8ch I suspect is due tOverall, she is neurologically stable. - She has been endorsing memory difficulties over the last year; notably forgetting what she had read. It is possible that this actually represents a concentration deficit. This perhaps represents post-stroke mild cognitive impairment, and/or depression. She recently had neuropsychological testing done to further clarify the extent of her impairment-results pending.  - She has been noting increased episodes of falling. I recommend she resume PT for gait training. Referral provided.  - She should continue to benefit from management of vascular risk factors, particularly aggressive management of blood pressure.  8/14/23 - She appears to have a subtle left ptosis; I suspect this is partially volitional due to her left sided pain. She is otherwise neurologically stable. - She has been endorsing memory difficulties over the last year. Neuropsychological testing done in 3/2023 demonstrated largely intact cognitive functioning, and was stable from 2017. - Her recent sleep study demonstrated a mild degree of obstructive sleep apnea. I have recommended consultation with Dr. Ulises Terry for a dental appliance. - She should continue to benefit from management of vascular risk factors, particularly aggressive management of blood pressure.  She can follow up in 6 months with Dopplers. I hope she remains free of further serious trouble.

## 2023-08-14 NOTE — HISTORY OF PRESENT ILLNESS
[FreeTextEntry1] : 58-year-old right-handed lady.  Summary from Dr. Austin Castellon's note dated 2/14/2019-with modification.  On 12/13/16, she noticed severe HA and associated neck pain. . She was seen by PCP as she noticed L sided numbness. She was given ABx by her PCP for possible sinus infection. On 12/25/16, she noticed some worsening of her symptoms and she was brought to [facility] . She was found to have ICH. She was evaluated by Dr. Livingston in 2/17 and she was started on levetiracetam 500 mg BID for episodes concerning for seizures (spreading sensory paresthesia concerning for sensory seizures). She was not able to tolerate levetiracetam  excessive sleepiness and was it was discontinued. She was also evaluated by Dr. Mcclain. She is currently not on any AEDs. In 2/18, she had severe headaches. She underwent LP to rule out CNS infection. She developed worsening of the HAs after the LP, presumed to be due to low pressure HAs. She was treated with VPA . She reports to try to taper herself off of the VPA but developed rebound HAs.  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  ICH workup: CT brain (12/25/16): Right posterior putaminal hemorrhage with surrounding vasogenic edema CTA head and neck (12/25/16): Unremarkable MRI brain (12/27/16): Redemonstration of posterior putaminal intracerebral hemorrhage with surrounding vasogenic edema, mild leukoaraiosis and no evidence of abnormal contrast enhancement, no evidence of prior microhemorrhages MRI brain (2/6/17): Expected evolution of right basal ganglia ICH without any evidence of acute stroke MRI brain/MRA head (7/17): Expected evolution and almost resolution of right basal ganglia ICH, no abnormal contrast enhancement is noted in the region, no evidence of vascular malformation  Repeat MRI brain with contrast (2/26/19-compared to 7/9/2017) was essentially unchanged, showing the chronic hemorrhage in the right lentiform nucleus, with small enhancing vessels, 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   Repeat MRA neck and head (2/26/2019) was unremarkable.  4/28/2020. This service was provided using telehealth (video). Location of patient: Home Location of provider: Office Names of all persons participating in the telehealth service and their role in the encounter: Patient  She reports no new focal neurologic symptoms.  She complains of persistent left-sided burning pain and headaches.  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  She can walk independently and sometimes uses a cane, is independent in all activities of daily living, but does not drive as she used to because she has trouble localizing sounds (MRS = 2).  10/27/2020.  Reports persistent left-sided burning pain.  5/10/2021.  She came to the office today.  She reports persistent left-sided burning pain, particularly affecting her leg.  In addition, she has chronic low back pain and has difficulty standing for more than 1-2 minutes.  She also reports neck pain, knee pain and other areas of pain, not specified.  6/13/2021.  She came to the office today.  She continues to complain of left-sided, particularly left leg burning pain.  She also complains of bilateral ankle and right knee pain as well as low back pain.  She has occasional right temporal headaches which resolved spontaneously.  She is undergoing PT and chiropractic treatment once a week which she feels provide some benefit  11/23/2022 Today she reports feeling well. Dopplers are WNL. She does complain of some short term memory issues. she is currently on Gabapentin 400 at night and Lyrica 25 mg in AM - working with Dr. Casillas to titrate off gabapentin.   5/10/2023.  She came to the office today. Over the last year, and more notably in the last 6 months, she notes that she has been experiencing difficulty with remembering things; for example, when she is reading, she forgets what she head read on the prior line. She is also noting increasing episodes of falling. She otherwise, denies any new focal neurologic symptoms.  8/14/23. She came to the office today. 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 She denies any new focal neurologic symptoms.  Neuropsychological Testing 3/31/23: Largely intact cognitive functioning across multiple domains, including basic attention, executive functioning, learning and memory, basic language and visuospatial functioning. Her processing speed is somewhat variable, as is learning efficiency. Largely stable from prior testing in March 2017. Residual weaknesses from her stroke likely include slowed, though variable processing speed.  The following recommendations were given: - Continue to optimally manage stress. - Continue use of general organization strategies. - Continue to engage in cognitive and socially stimulating activities.

## 2023-08-14 NOTE — PHYSICAL EXAM
[General Appearance - Alert] : alert [General Appearance - In No Acute Distress] : in no acute distress [Oriented To Time, Place, And Person] : oriented to person, place, and time [Impaired Insight] : insight and judgment were intact [Affect] : the affect was normal [Sclera] : the sclera and conjunctiva were normal [PERRL With Normal Accommodation] : pupils were equal in size, round, reactive to light, with normal accommodation [Extraocular Movements] : extraocular movements were intact [Outer Ear] : the ears and nose were normal in appearance [Hearing Threshold Finger Rub Not [place]] : hearing was normal [Oropharynx] : the oropharynx was normal [Neck Appearance] : the appearance of the neck was normal [Neck Cervical Mass (___cm)] : no neck mass was observed [Jugular Venous Distention Increased] : there was no jugular-venous distention [Thyroid Diffuse Enlargement] : the thyroid was not enlarged [Thyroid Nodule] : there were no palpable thyroid nodules [Auscultation Breath Sounds / Voice Sounds] : lungs were clear to auscultation bilaterally [Heart Rate And Rhythm] : heart rate was normal and rhythm regular [Heart Sounds] : normal S1 and S2 [Heart Sounds Gallop] : no gallops [Murmurs] : no murmurs [Heart Sounds Pericardial Friction Rub] : no pericardial rub [Arterial Pulses Carotid] : carotid pulses were normal with no bruits [Veins - Varicosity Changes] : there were no varicosital changes [No CVA Tenderness] : no ~M costovertebral angle tenderness [No Spinal Tenderness] : no spinal tenderness [Abnormal Walk] : normal gait [Nail Clubbing] : no clubbing  or cyanosis of the fingernails [Musculoskeletal - Swelling] : no joint swelling seen [Motor Tone] : muscle strength and tone were normal [Skin Color & Pigmentation] : normal skin color and pigmentation [Skin Turgor] : normal skin turgor [] : no rash [FreeTextEntry1] : Mild right lower extremity distal edema

## 2023-08-14 NOTE — REVIEW OF SYSTEMS
[Feeling Tired] : feeling tired [Recent Weight Gain (___ Lbs)] : recent [unfilled] ~Ulb weight gain [As Noted in HPI] : as noted in HPI [Chest Pain] : chest pain [Joint Pain] : joint pain [FreeTextEntry5] : Chest pain diagnosed as noncardiac, probably GI-related [Negative] : Cardiovascular [FreeTextEntry2] : Daytime fatigue and also snoring [FreeTextEntry8] : Frequency [FreeTextEntry9] : Sciatica and multifocal joint pain

## 2023-08-29 ENCOUNTER — APPOINTMENT (OUTPATIENT)
Dept: RADIOLOGY | Facility: CLINIC | Age: 58
End: 2023-08-29
Payer: COMMERCIAL

## 2023-08-29 ENCOUNTER — APPOINTMENT (OUTPATIENT)
Dept: MRI IMAGING | Facility: CLINIC | Age: 58
End: 2023-08-29
Payer: COMMERCIAL

## 2023-08-29 ENCOUNTER — OUTPATIENT (OUTPATIENT)
Dept: OUTPATIENT SERVICES | Facility: HOSPITAL | Age: 58
LOS: 1 days | End: 2023-08-29
Payer: COMMERCIAL

## 2023-08-29 DIAGNOSIS — Z00.8 ENCOUNTER FOR OTHER GENERAL EXAMINATION: ICD-10-CM

## 2023-08-29 DIAGNOSIS — M54.16 RADICULOPATHY, LUMBAR REGION: ICD-10-CM

## 2023-08-29 DIAGNOSIS — H26.40 UNSPECIFIED SECONDARY CATARACT: Chronic | ICD-10-CM

## 2023-08-29 PROCEDURE — 72148 MRI LUMBAR SPINE W/O DYE: CPT

## 2023-08-29 PROCEDURE — 72148 MRI LUMBAR SPINE W/O DYE: CPT | Mod: 26

## 2023-09-11 ENCOUNTER — APPOINTMENT (OUTPATIENT)
Dept: NEUROLOGY | Facility: CLINIC | Age: 58
End: 2023-09-11
Payer: COMMERCIAL

## 2023-09-11 VITALS
HEIGHT: 64 IN | WEIGHT: 181 LBS | BODY MASS INDEX: 30.9 KG/M2 | HEART RATE: 72 BPM | SYSTOLIC BLOOD PRESSURE: 135 MMHG | DIASTOLIC BLOOD PRESSURE: 82 MMHG

## 2023-09-11 DIAGNOSIS — Z91.89 OTHER SPECIFIED PERSONAL RISK FACTORS, NOT ELSEWHERE CLASSIFIED: ICD-10-CM

## 2023-09-11 DIAGNOSIS — R41.3 OTHER AMNESIA: ICD-10-CM

## 2023-09-11 DIAGNOSIS — R45.86 EMOTIONAL LABILITY: ICD-10-CM

## 2023-09-11 DIAGNOSIS — G47.30 SLEEP APNEA, UNSPECIFIED: ICD-10-CM

## 2023-09-11 DIAGNOSIS — R41.840 ATTENTION AND CONCENTRATION DEFICIT: ICD-10-CM

## 2023-09-11 PROCEDURE — 99215 OFFICE O/P EST HI 40 MIN: CPT

## 2023-11-25 ENCOUNTER — RX RENEWAL (OUTPATIENT)
Age: 58
End: 2023-11-25

## 2023-12-03 ENCOUNTER — RX RENEWAL (OUTPATIENT)
Age: 58
End: 2023-12-03

## 2024-02-23 NOTE — ED PROVIDER NOTE - SKIN, MLM
[de-identified] : Assessment:   The patient presents with history, examination and imaging that are most consistent with a diagnosis of:  Traumatic complete rotator cuff tear of right shoulder   The patient would like to pursue conservative measures at this time. After consideration of various non-operative treatment modalities, the patient would like to proceed with the modalities listed below.   During this appointment the patient was examined, diagnoses were discussed and explained in a face to face manner. In addition extensive time was spent reviewing aforementioned diagnostic studies. Counseling including abnormal image results, differential diagnoses, treatment options, risk and benefits, lifestyle changes, current condition, and current medications was performed. Patient's comments, questions, and concerns were address and patient verbalized understanding.   ---------------------------     Plan: - Discussed non-operative and operative treatment options including Reverse Total Shoulder Replacement vs Rotator Cuff Repair.  All questions and concerns regarding the surgery were addressed. Considering patient has appropriate ROM and strength, discussed that surgical treatment is not urgent at this time. Patient elected to continue with conservative management - Discussed Physical Therapy, patient declined    Follow-up:  3 months Skin normal color for race, warm, dry and intact. No evidence of rash.

## 2024-03-22 ENCOUNTER — RX RENEWAL (OUTPATIENT)
Age: 59
End: 2024-03-22

## 2024-04-01 ENCOUNTER — APPOINTMENT (OUTPATIENT)
Dept: NEUROLOGY | Facility: CLINIC | Age: 59
End: 2024-04-01
Payer: COMMERCIAL

## 2024-04-01 VITALS
DIASTOLIC BLOOD PRESSURE: 80 MMHG | SYSTOLIC BLOOD PRESSURE: 120 MMHG | HEIGHT: 64 IN | BODY MASS INDEX: 32.1 KG/M2 | WEIGHT: 188 LBS | HEART RATE: 62 BPM

## 2024-04-01 DIAGNOSIS — I61.9 NONTRAUMATIC INTRACEREBRAL HEMORRHAGE, UNSPECIFIED: ICD-10-CM

## 2024-04-01 PROCEDURE — 93886 INTRACRANIAL COMPLETE STUDY: CPT

## 2024-04-01 PROCEDURE — 93892 TCD EMBOLI DETECT W/O INJ: CPT

## 2024-04-01 PROCEDURE — 93880 EXTRACRANIAL BILAT STUDY: CPT

## 2024-04-01 PROCEDURE — 99214 OFFICE O/P EST MOD 30 MIN: CPT

## 2024-04-01 PROCEDURE — G2211 COMPLEX E/M VISIT ADD ON: CPT | Mod: NC,1L

## 2024-04-01 NOTE — CONSULT LETTER
[Dear  ___] : Dear  [unfilled], [Please see my note below.] : Please see my note below. [Consult Closing:] : Thank you very much for allowing me to participate in the care of this patient.  If you have any questions, please do not hesitate to contact me. [Sincerely,] : Sincerely, [DrBurke  ___] : Dr. MUNOZ [FreeTextEntry3] : Richard B. Libman, MD, FRCPC \par  , Neurology \par  Co-Director, Stroke Center\par  Professor of Neurology\par  [facility] [employer] at [facility]\par   [FreeTextEntry2] : Zoltan Meza MD\par  [place]

## 2024-04-01 NOTE — PHYSICAL EXAM
[General Appearance - Alert] : alert [General Appearance - In No Acute Distress] : in no acute distress [Oriented To Time, Place, And Person] : oriented to person, place, and time [Impaired Insight] : insight and judgment were intact [Affect] : the affect was normal [Sclera] : the sclera and conjunctiva were normal [PERRL With Normal Accommodation] : pupils were equal in size, round, reactive to light, with normal accommodation [Extraocular Movements] : extraocular movements were intact [Outer Ear] : the ears and nose were normal in appearance [Hearing Threshold Finger Rub Not [place]] : hearing was normal [Oropharynx] : the oropharynx was normal [Neck Appearance] : the appearance of the neck was normal [Neck Cervical Mass (___cm)] : no neck mass was observed [Jugular Venous Distention Increased] : there was no jugular-venous distention [Thyroid Diffuse Enlargement] : the thyroid was not enlarged [Thyroid Nodule] : there were no palpable thyroid nodules [Auscultation Breath Sounds / Voice Sounds] : lungs were clear to auscultation bilaterally [Heart Rate And Rhythm] : heart rate was normal and rhythm regular [Heart Sounds] : normal S1 and S2 [Heart Sounds Gallop] : no gallops [Murmurs] : no murmurs [Heart Sounds Pericardial Friction Rub] : no pericardial rub [Arterial Pulses Carotid] : carotid pulses were normal with no bruits [Veins - Varicosity Changes] : there were no varicosital changes [No CVA Tenderness] : no ~M costovertebral angle tenderness [No Spinal Tenderness] : no spinal tenderness [Abnormal Walk] : normal gait [Nail Clubbing] : no clubbing  or cyanosis of the fingernails [Musculoskeletal - Swelling] : no joint swelling seen [Motor Tone] : muscle strength and tone were normal [Skin Color & Pigmentation] : normal skin color and pigmentation [Skin Turgor] : normal skin turgor [] : no rash [FreeTextEntry1] : Mild right lower extremity distal edema

## 2024-04-01 NOTE — HISTORY OF PRESENT ILLNESS
[FreeTextEntry1] : 58-year-old right-handed lady.  Summary from Dr. Austin Castellon's note dated 2/14/2019-with modification.  On 12/13/16, she noticed severe HA and associated neck pain. . She was seen by PCP as she noticed L sided numbness. She was given ABx by her PCP for possible sinus infection. On 12/25/16, she noticed some worsening of her symptoms and she was brought to [facility] . She was found to have ICH. She was evaluated by Dr. Livingston in 2/17 and she was started on levetiracetam 500 mg BID for episodes concerning for seizures (spreading sensory paresthesia concerning for sensory seizures). She was not able to tolerate levetiracetam  excessive sleepiness and was it was discontinued. She was also evaluated by Dr. Mcclain. She is currently not on any AEDs. In 2/18, she had severe headaches. She underwent LP to rule out CNS infection. She developed worsening of the HAs after the LP, presumed to be due to low pressure HAs. She was treated with VPA . She reports to try to taper herself off of the VPA but developed rebound HAs.  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  ICH workup: CT brain (12/25/16): Right posterior putaminal hemorrhage with surrounding vasogenic edema CTA head and neck (12/25/16): Unremarkable MRI brain (12/27/16): Redemonstration of posterior putaminal intracerebral hemorrhage with surrounding vasogenic edema, mild leukoaraiosis and no evidence of abnormal contrast enhancement, no evidence of prior microhemorrhages MRI brain (2/6/17): Expected evolution of right basal ganglia ICH without any evidence of acute stroke MRI brain/MRA head (7/17): Expected evolution and almost resolution of right basal ganglia ICH, no abnormal contrast enhancement is noted in the region, no evidence of vascular malformation  Repeat MRI brain with contrast (2/26/19-compared to 7/9/2017) was essentially unchanged, showing the chronic hemorrhage in the right lentiform nucleus, with small enhancing vessels, 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   Repeat MRA neck and head (2/26/2019) was unremarkable.  4/28/2020. This service was provided using telehealth (video). Location of patient: Home Location of provider: Office Names of all persons participating in the telehealth service and their role in the encounter: Patient  She reports no new focal neurologic symptoms.  She complains of persistent left-sided burning pain and headaches.  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  She can walk independently and sometimes uses a cane, is independent in all activities of daily living, but does not drive as she used to because she has trouble localizing sounds (MRS = 2).  10/27/2020.  Reports persistent left-sided burning pain.  5/10/2021.  She came to the office today.  She reports persistent left-sided burning pain, particularly affecting her leg.  In addition, she has chronic low back pain and has difficulty standing for more than 1-2 minutes.  She also reports neck pain, knee pain and other areas of pain, not specified.  6/13/2021.  She came to the office today.  She continues to complain of left-sided, particularly left leg burning pain.  She also complains of bilateral ankle and right knee pain as well as low back pain.  She has occasional right temporal headaches which resolved spontaneously.  She is undergoing PT and chiropractic treatment once a week which she feels provide some benefit  11/23/2022 Today she reports feeling well. Dopplers are WNL. She does complain of some short term memory issues. she is currently on Gabapentin 400 at night and Lyrica 25 mg in AM - working with Dr. Casillas to titrate off gabapentin.   5/10/2023.  She came to the office today. Over the last year, and more notably in the last 6 months, she notes that she has been experiencing difficulty with remembering things; for example, when she is reading, she forgets what she head read on the prior line. She is also noting increasing episodes of falling. She otherwise, denies any new focal neurologic symptoms.  8/14/23. She came to the office today. 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 She denies any new focal neurologic symptoms.  Neuropsychological Testing 3/31/23: Largely intact cognitive functioning across multiple domains, including basic attention, executive functioning, learning and memory, basic language and visuospatial functioning. Her processing speed is somewhat variable, as is learning efficiency. Largely stable from prior testing in March 2017. Residual weaknesses from her stroke likely include slowed, though variable processing speed.  The following recommendations were given: - Continue to optimally manage stress. - Continue use of general organization strategies. - Continue to engage in cognitive and socially stimulating activities.  4/1/2024 Today she reports feeling well. Denies any new symptoms. Her TCD and Carotid revealed a mild increase in L ICA stenosis (now <40%). She was recently started on Metformin. She is on Lyrica 150 mg BID under the care of Dr. Casillas.

## 2024-04-01 NOTE — REVIEW OF SYSTEMS
[Feeling Tired] : feeling tired [Recent Weight Gain (___ Lbs)] : recent [unfilled] ~Ulb weight gain [As Noted in HPI] : as noted in HPI [Joint Pain] : joint pain [Negative] : Heme/Lymph [FreeTextEntry2] : Daytime fatigue and also snoring [FreeTextEntry8] : Frequency [FreeTextEntry9] : Sciatica and multifocal joint pain

## 2024-04-01 NOTE — DISCUSSION/SUMMARY
[Intensive Blood Pressure Control] : intensive blood pressure control [Lipid Lowering Therapy] : lipid lowering therapy [Patient encouraged to discuss with Primary MD] : I encouraged the patient to discuss these important issues with ~his/her~ primary care doctor [Goals and Counseling] : I have reviewed the goals of stroke risk factor modification. I counseled the patient on measures to reduce stroke risk, including the importance of medication compliance, risk factor control, exercise, healthy diet and avoidance of smoking. I reviewed stroke warning signs and symptoms and appropriate actions to take if such occur. [FreeTextEntry1] : Summary from Dr. Austin Castellon's note dated 2/14/2019-with modification.  On 12/13/16, she noticed severe HA and associated neck pain, as well as left-sided numbness, due to a right basal ganglia hemorrhage (probably just impinging on the  right lateral thalamus), most likely due to chronic hypertension.  Subsequent MRI suggested the possibility of a tiny cavernoma, but I doubt that this is clinically relevant and, in any case, the deep location would make surgical resection difficult.  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  4/28/20.  Overall she appears to be neurologically stable.  Her main complaints at are centered around her post stroke central pain syndrome and other types of pain such as sciatica.  From the neurovascular standpoint, I advised her whenever possible to have frequent follow-up with you for her blood pressure.  10/27/2020.  She has persistent left-sided burning pain on gabapentin 400 mg twice daily.  Encouraged her to take gabapentin 3 times daily as had been prescribed.  5/10/2021.  Overall she is neurologically stable.  From the standpoint of focal deficits, she is doing quite well, but she continues to have a left-sided post stroke pain syndrome, as well as multifocal pain, which interferes with her quality of life.  I suggested consultation with Dr. Lucho Casillas (pain management specialist).  She has daytime fatigue and snores, raising the possibility of sleep apnea.  I have requested a home sleep study.  She should benefit from ongoing management of vascular risk factors, particularly aggressive management of blood pressure, which should decrease the risk of recurrent intracerebral hemorrhage.  She will continue outpatient PT.  6/13/2022.  Overall she is neurologically stable or even improved in terms of subtle focal deficits, but she continues to experience severe poststroke central pain, as well as what I assume to be orthopedic pain now in her right leg, perhaps because of overcompensation for left-sided pain.  I have again referred her to Dr. Lucho Casillas for pain management.  She will continue PT and chiropractic treatment as well.  She continues to complain of daytime fatigue, raising the possibility of sleep apnea.  I have again requested a home sleep study.  11/23/22 Neurologically she is stable. I spoke with Dr. Casillas who advised she can stop gabapentin and start Lyrica 25 mg BID and follow up with him. Dopplers are WNL. Placed order for driving evaluation. Referral placed for neuro psych evaluation. Follow up with us in 6 months or sooner if needed.   5/10/2023. - There was the interval appearance of a subtle left ptosis, wh8ch I suspect is due tOverall, she is neurologically stable. - She has been endorsing memory difficulties over the last year; notably forgetting what she had read. It is possible that this actually represents a concentration deficit. This perhaps represents post-stroke mild cognitive impairment, and/or depression. She recently had neuropsychological testing done to further clarify the extent of her impairment-results pending.  - She has been noting increased episodes of falling. I recommend she resume PT for gait training. Referral provided.  - She should continue to benefit from management of vascular risk factors, particularly aggressive management of blood pressure.  8/14/23 - She appears to have a subtle left ptosis; I suspect this is partially volitional due to her left sided pain. She is otherwise neurologically stable. - She has been endorsing memory difficulties over the last year. Neuropsychological testing done in 3/2023 demonstrated largely intact cognitive functioning, and was stable from 2017. - Her recent sleep study demonstrated a mild degree of obstructive sleep apnea. I have recommended consultation with Dr. Ulises Terry for a dental appliance. - She should continue to benefit from management of vascular risk factors, particularly aggressive management of blood pressure.  4/1/2024 Neurologically she is stable. Her TCD and Carotid revealed a mild increase in L ICA stenosis (now <40%) so we discussed importance of strict risk factor control. She was recently started on metformin and remains on a statin. She will follow up with Dr. Casillas for further management of neuropathy.   She can follow up in 6 months.. I hope she remains free of further serious trouble.

## 2024-04-22 ENCOUNTER — RX RENEWAL (OUTPATIENT)
Age: 59
End: 2024-04-22

## 2024-04-22 RX ORDER — RIVASTIGMINE TARTRATE 1.5 MG/1
1.5 CAPSULE ORAL
Qty: 180 | Refills: 3 | Status: ACTIVE | COMMUNITY
Start: 2023-09-11 | End: 1900-01-01

## 2024-04-25 NOTE — DISCHARGE NOTE NURSING/CASE MANAGEMENT/SOCIAL WORK - BRAND OF FIRST COVID-19 BOOSTER
[facility] Call Center    Phone Message    May a detailed message be left on voicemail: yes     Reason for Call: Medication Refill Request    Has the patient contacted the pharmacy for the refill? Yes   Name of medication being requested: Zoloft (medication prescribed by provider in 4/24/24 appointment)  Provider who prescribed the medication:   Carmen Vieira MD     Pharmacy:  [facility] [id number] [place] [address]  Date medication is needed: 4/25/24    **Mom was following up on the medication that was prescribed during 4/24/24 appointment for Zoloft     Action Taken: Other: p midb psychiatry    Travel Screening: Not Applicable                                                                    [employer]

## 2024-05-17 ENCOUNTER — RX RENEWAL (OUTPATIENT)
Age: 59
End: 2024-05-17

## 2024-05-17 RX ORDER — PREGABALIN 150 MG/1
150 CAPSULE ORAL
Qty: 60 | Refills: 1 | Status: ACTIVE | COMMUNITY
Start: 2023-01-17 | End: 1900-01-01

## 2024-06-13 RX ORDER — AMITRIPTYLINE HYDROCHLORIDE 25 MG/1
25 TABLET, FILM COATED ORAL
Qty: 60 | Refills: 0 | Status: ACTIVE | COMMUNITY
Start: 2020-04-28 | End: 1900-01-01

## 2024-07-25 ENCOUNTER — RX RENEWAL (OUTPATIENT)
Age: 59
End: 2024-07-25

## 2024-07-26 ENCOUNTER — RX RENEWAL (OUTPATIENT)
Age: 59
End: 2024-07-26

## 2024-08-01 ENCOUNTER — RX RENEWAL (OUTPATIENT)
Age: 59
End: 2024-08-01

## 2024-10-03 ENCOUNTER — RX RENEWAL (OUTPATIENT)
Age: 59
End: 2024-10-03

## 2024-10-03 ENCOUNTER — NON-APPOINTMENT (OUTPATIENT)
Age: 59
End: 2024-10-03

## 2024-10-14 ENCOUNTER — APPOINTMENT (OUTPATIENT)
Dept: PAIN MANAGEMENT | Facility: CLINIC | Age: 59
End: 2024-10-14
Payer: COMMERCIAL

## 2024-10-14 VITALS
HEIGHT: 65 IN | SYSTOLIC BLOOD PRESSURE: 128 MMHG | WEIGHT: 191 LBS | HEART RATE: 63 BPM | BODY MASS INDEX: 31.82 KG/M2 | DIASTOLIC BLOOD PRESSURE: 81 MMHG

## 2024-10-14 PROCEDURE — 99214 OFFICE O/P EST MOD 30 MIN: CPT

## 2024-10-14 RX ORDER — DICLOFENAC SODIUM 10 MG/G
1 GEL TOPICAL DAILY
Qty: 3 | Refills: 1 | Status: ACTIVE | COMMUNITY
Start: 2024-10-14 | End: 1900-01-01

## 2024-11-01 ENCOUNTER — APPOINTMENT (OUTPATIENT)
Dept: PAIN MANAGEMENT | Facility: CLINIC | Age: 59
End: 2024-11-01

## 2024-12-23 ENCOUNTER — APPOINTMENT (OUTPATIENT)
Dept: NEUROLOGY | Facility: CLINIC | Age: 59
End: 2024-12-23

## 2024-12-30 ENCOUNTER — APPOINTMENT (OUTPATIENT)
Dept: NEUROLOGY | Facility: CLINIC | Age: 59
End: 2024-12-30
Payer: COMMERCIAL

## 2024-12-30 VITALS
DIASTOLIC BLOOD PRESSURE: 81 MMHG | HEART RATE: 67 BPM | SYSTOLIC BLOOD PRESSURE: 121 MMHG | HEIGHT: 65 IN | WEIGHT: 188 LBS | BODY MASS INDEX: 31.32 KG/M2

## 2024-12-30 PROCEDURE — 99215 OFFICE O/P EST HI 40 MIN: CPT

## 2024-12-30 PROCEDURE — 99417 PROLNG OP E/M EACH 15 MIN: CPT

## 2024-12-30 PROCEDURE — G2211 COMPLEX E/M VISIT ADD ON: CPT | Mod: NC

## 2024-12-30 RX ORDER — METFORMIN HYDROCHLORIDE 625 MG/1
TABLET ORAL
Refills: 0 | Status: ACTIVE | COMMUNITY

## 2024-12-31 ENCOUNTER — NON-APPOINTMENT (OUTPATIENT)
Age: 59
End: 2024-12-31

## 2024-12-31 ENCOUNTER — RX RENEWAL (OUTPATIENT)
Age: 59
End: 2024-12-31

## 2025-03-07 ENCOUNTER — RX RENEWAL (OUTPATIENT)
Age: 60
End: 2025-03-07

## 2025-03-10 ENCOUNTER — RX RENEWAL (OUTPATIENT)
Age: 60
End: 2025-03-10

## 2025-03-12 ENCOUNTER — NON-APPOINTMENT (OUTPATIENT)
Age: 60
End: 2025-03-12

## 2025-03-12 ENCOUNTER — APPOINTMENT (OUTPATIENT)
Dept: PAIN MANAGEMENT | Facility: CLINIC | Age: 60
End: 2025-03-12

## 2025-03-12 VITALS
BODY MASS INDEX: 31.65 KG/M2 | SYSTOLIC BLOOD PRESSURE: 133 MMHG | WEIGHT: 190 LBS | DIASTOLIC BLOOD PRESSURE: 88 MMHG | HEART RATE: 68 BPM | HEIGHT: 65 IN

## 2025-03-12 PROCEDURE — 99214 OFFICE O/P EST MOD 30 MIN: CPT

## 2025-03-12 RX ORDER — TIRZEPATIDE 2.5 MG/.5ML
2.5 INJECTION, SOLUTION SUBCUTANEOUS
Refills: 0 | Status: ACTIVE | COMMUNITY

## 2025-04-28 ENCOUNTER — APPOINTMENT (OUTPATIENT)
Dept: NEUROLOGY | Facility: CLINIC | Age: 60
End: 2025-04-28

## 2025-05-15 ENCOUNTER — APPOINTMENT (OUTPATIENT)
Dept: PAIN MANAGEMENT | Facility: CLINIC | Age: 60
End: 2025-05-15

## 2025-05-15 VITALS
HEART RATE: 57 BPM | HEIGHT: 65 IN | SYSTOLIC BLOOD PRESSURE: 127 MMHG | BODY MASS INDEX: 28.66 KG/M2 | DIASTOLIC BLOOD PRESSURE: 77 MMHG | WEIGHT: 172 LBS

## 2025-05-15 PROCEDURE — 99214 OFFICE O/P EST MOD 30 MIN: CPT

## 2025-05-30 ENCOUNTER — APPOINTMENT (OUTPATIENT)
Dept: NEUROLOGY | Facility: CLINIC | Age: 60
End: 2025-05-30
Payer: COMMERCIAL

## 2025-05-30 VITALS
SYSTOLIC BLOOD PRESSURE: 132 MMHG | HEIGHT: 65 IN | HEART RATE: 59 BPM | WEIGHT: 169 LBS | DIASTOLIC BLOOD PRESSURE: 77 MMHG | BODY MASS INDEX: 28.16 KG/M2

## 2025-05-30 DIAGNOSIS — R41.3 OTHER AMNESIA: ICD-10-CM

## 2025-05-30 PROCEDURE — 99205 OFFICE O/P NEW HI 60 MIN: CPT

## 2025-05-30 PROCEDURE — G2211 COMPLEX E/M VISIT ADD ON: CPT | Mod: NC

## 2025-05-30 RX ORDER — RIVASTIGMINE TARTRATE 3 MG/1
3 CAPSULE ORAL DAILY
Qty: 30 | Refills: 0 | Status: ACTIVE | COMMUNITY
Start: 2025-05-30 | End: 1900-01-01

## 2025-05-30 RX ORDER — RIVASTIGMINE TARTRATE 1.5 MG/1
1.5 CAPSULE ORAL ONCE
Qty: 30 | Refills: 3 | Status: ACTIVE | COMMUNITY
Start: 2025-05-30 | End: 1900-01-01

## 2025-06-16 ENCOUNTER — APPOINTMENT (OUTPATIENT)
Dept: PAIN MANAGEMENT | Facility: CLINIC | Age: 60
End: 2025-06-16

## 2025-07-11 ENCOUNTER — APPOINTMENT (OUTPATIENT)
Dept: NEUROLOGY | Facility: CLINIC | Age: 60
End: 2025-07-11

## 2025-07-11 PROCEDURE — 95806 SLEEP STUDY UNATT&RESP EFFT: CPT

## 2025-07-22 ENCOUNTER — RX RENEWAL (OUTPATIENT)
Age: 60
End: 2025-07-22

## 2025-07-23 ENCOUNTER — RESULT REVIEW (OUTPATIENT)
Age: 60
End: 2025-07-23

## 2025-07-23 ENCOUNTER — APPOINTMENT (OUTPATIENT)
Dept: NEUROLOGY | Facility: CLINIC | Age: 60
End: 2025-07-23
Payer: COMMERCIAL

## 2025-07-23 VITALS
DIASTOLIC BLOOD PRESSURE: 79 MMHG | SYSTOLIC BLOOD PRESSURE: 122 MMHG | BODY MASS INDEX: 26.82 KG/M2 | HEART RATE: 58 BPM | HEIGHT: 65 IN | WEIGHT: 161 LBS

## 2025-07-23 DIAGNOSIS — I61.9 NONTRAUMATIC INTRACEREBRAL HEMORRHAGE, UNSPECIFIED: ICD-10-CM

## 2025-07-23 DIAGNOSIS — G89.0 CENTRAL PAIN SYNDROME: ICD-10-CM

## 2025-07-23 PROCEDURE — 93892 TCD EMBOLI DETECT W/O INJ: CPT

## 2025-07-23 PROCEDURE — 93886 INTRACRANIAL COMPLETE STUDY: CPT

## 2025-07-23 PROCEDURE — 99215 OFFICE O/P EST HI 40 MIN: CPT

## 2025-07-23 PROCEDURE — 99417 PROLNG OP E/M EACH 15 MIN: CPT

## 2025-07-23 PROCEDURE — G2211 COMPLEX E/M VISIT ADD ON: CPT | Mod: NC

## 2025-07-23 PROCEDURE — 93880 EXTRACRANIAL BILAT STUDY: CPT

## 2025-07-23 RX ORDER — OMEGA-3/DHA/EPA/FISH OIL 300-1000MG
CAPSULE ORAL
Refills: 0 | Status: ACTIVE | COMMUNITY

## 2025-09-05 ENCOUNTER — TRANSCRIPTION ENCOUNTER (OUTPATIENT)
Age: 60
End: 2025-09-05